# Patient Record
Sex: FEMALE | Race: BLACK OR AFRICAN AMERICAN | Employment: OTHER | URBAN - METROPOLITAN AREA
[De-identification: names, ages, dates, MRNs, and addresses within clinical notes are randomized per-mention and may not be internally consistent; named-entity substitution may affect disease eponyms.]

---

## 2018-10-01 ENCOUNTER — HOSPITAL ENCOUNTER (EMERGENCY)
Age: 48
Discharge: HOME | End: 2018-10-01

## 2018-10-01 VITALS
TEMPERATURE: 97.7 F | DIASTOLIC BLOOD PRESSURE: 96 MMHG | SYSTOLIC BLOOD PRESSURE: 169 MMHG | RESPIRATION RATE: 16 BRPM | HEART RATE: 97 BPM | OXYGEN SATURATION: 100 % | WEIGHT: 218.7 LBS | BODY MASS INDEX: 36.44 KG/M2 | HEIGHT: 65 IN

## 2018-10-01 DIAGNOSIS — K12.1 STOMATITIS: ICD-10-CM

## 2018-10-01 DIAGNOSIS — J32.4 CHRONIC PANSINUSITIS: Primary | ICD-10-CM

## 2018-10-01 LAB — S PYO AG THROAT QL: NEGATIVE

## 2018-10-01 PROCEDURE — 87081 CULTURE SCREEN ONLY: CPT

## 2018-10-01 PROCEDURE — 87880 STREP A ASSAY W/OPTIC: CPT

## 2018-10-01 PROCEDURE — 99283 EMERGENCY DEPT VISIT LOW MDM: CPT

## 2018-10-01 RX ORDER — CLARITHROMYCIN 500 MG/1
500 TABLET, COATED ORAL 2 TIMES DAILY
Qty: 14 TABLET | Refills: 0 | Status: SHIPPED | OUTPATIENT
Start: 2018-10-01 | End: 2018-10-08

## 2018-10-01 RX ORDER — PREDNISONE 20 MG/1
TABLET ORAL
Qty: 18 TABLET | Refills: 0 | Status: SHIPPED | OUTPATIENT
Start: 2018-10-01 | End: 2018-10-11

## 2018-10-01 ASSESSMENT — PAIN SCALES - GENERAL
PAINLEVEL_OUTOF10: 10
PAINLEVEL_OUTOF10: 10

## 2018-10-02 NOTE — ED PROVIDER NOTES
List as of 10/1/2018  3:39 PM      CONTINUE these medications which have NOT CHANGED    Details   albuterol (PROVENTIL HFA;VENTOLIN HFA) 108 (90 BASE) MCG/ACT inhaler Inhale 2 puffs into the lungs every 6 hours as needed for Wheezing. ALLERGIES     Patient has no known allergies. FAMILY HISTORY     History reviewed. No pertinent family history. No family status information on file. SOCIAL HISTORY      reports that she has never smoked. She has never used smokeless tobacco. She reports that she does not drink alcohol or use drugs. PHYSICAL EXAM    (up to 7 for level 4, 8 or more for level 5)     ED Triage Vitals [10/01/18 1450]   BP Temp Temp Source Pulse Resp SpO2 Height Weight   (!) 169/96 97.7 °F (36.5 °C) Oral 97 16 100 % 5' 5\" (1.651 m) 218 lb 11.1 oz (99.2 kg)       Physical Exam   Constitutional: She is oriented to person, place, and time. Vital signs are normal. She appears well-developed and well-nourished. She is cooperative. Non-toxic appearance. She does not have a sickly appearance. She does not appear ill. No distress. HENT:   Head: Normocephalic. Right Ear: Tympanic membrane, external ear and ear canal normal.   Left Ear: Tympanic membrane, external ear and ear canal normal.   Nose: Mucosal edema and rhinorrhea present. Right sinus exhibits maxillary sinus tenderness and frontal sinus tenderness. Left sinus exhibits maxillary sinus tenderness and frontal sinus tenderness. Mouth/Throat: Uvula is midline, oropharynx is clear and moist and mucous membranes are normal.   Eyes: Pupils are equal, round, and reactive to light. Neck: Normal range of motion. Neck supple. Cardiovascular: Normal rate and regular rhythm. Pulmonary/Chest: Effort normal and breath sounds normal. She has no wheezes. Lymphadenopathy:     She has no cervical adenopathy. Neurological: She is alert and oriented to person, place, and time. Skin: Skin is warm and dry. No rash noted.  She is not

## 2018-10-03 LAB — S PYO THROAT QL CULT: NORMAL

## 2018-10-03 ASSESSMENT — ENCOUNTER SYMPTOMS
SORE THROAT: 1
TROUBLE SWALLOWING: 0
SINUS PRESSURE: 1
SINUS PAIN: 1
COUGH: 1
GASTROINTESTINAL NEGATIVE: 1
VOICE CHANGE: 0

## 2018-10-22 ENCOUNTER — APPOINTMENT (OUTPATIENT)
Dept: GENERAL RADIOLOGY | Age: 48
End: 2018-10-22

## 2018-10-22 ENCOUNTER — HOSPITAL ENCOUNTER (EMERGENCY)
Age: 48
Discharge: HOME | End: 2018-10-22
Attending: EMERGENCY MEDICINE

## 2018-10-22 VITALS
TEMPERATURE: 97.9 F | SYSTOLIC BLOOD PRESSURE: 142 MMHG | RESPIRATION RATE: 16 BRPM | BODY MASS INDEX: 37.47 KG/M2 | DIASTOLIC BLOOD PRESSURE: 88 MMHG | HEIGHT: 65 IN | WEIGHT: 224.87 LBS | HEART RATE: 102 BPM | OXYGEN SATURATION: 100 %

## 2018-10-22 DIAGNOSIS — J02.9 ACUTE PHARYNGITIS, UNSPECIFIED ETIOLOGY: Primary | ICD-10-CM

## 2018-10-22 DIAGNOSIS — K14.0 GLOSSITIS: ICD-10-CM

## 2018-10-22 PROCEDURE — 70360 X-RAY EXAM OF NECK: CPT

## 2018-10-22 PROCEDURE — 6360000002 HC RX W HCPCS: Performed by: EMERGENCY MEDICINE

## 2018-10-22 PROCEDURE — 99283 EMERGENCY DEPT VISIT LOW MDM: CPT

## 2018-10-22 PROCEDURE — 6370000000 HC RX 637 (ALT 250 FOR IP): Performed by: EMERGENCY MEDICINE

## 2018-10-22 RX ORDER — IBUPROFEN 200 MG
800 TABLET ORAL EVERY 6 HOURS PRN
COMMUNITY
End: 2019-02-02

## 2018-10-22 RX ORDER — DEXAMETHASONE 4 MG/1
10 TABLET ORAL ONCE
Status: COMPLETED | OUTPATIENT
Start: 2018-10-22 | End: 2018-10-22

## 2018-10-22 RX ORDER — FLUCONAZOLE 200 MG/1
200 TABLET ORAL DAILY
Qty: 7 TABLET | Refills: 0 | Status: SHIPPED | OUTPATIENT
Start: 2018-10-22 | End: 2018-10-29

## 2018-10-22 RX ORDER — GABAPENTIN 300 MG/1
300 CAPSULE ORAL 3 TIMES DAILY
Qty: 30 CAPSULE | Refills: 0 | Status: SHIPPED | OUTPATIENT
Start: 2018-10-22 | End: 2019-09-05

## 2018-10-22 RX ORDER — NAPROXEN 250 MG/1
500 TABLET ORAL ONCE
Status: COMPLETED | OUTPATIENT
Start: 2018-10-22 | End: 2018-10-22

## 2018-10-22 RX ADMIN — DEXAMETHASONE 10 MG: 4 TABLET ORAL at 22:18

## 2018-10-22 RX ADMIN — NAPROXEN 500 MG: 250 TABLET ORAL at 22:19

## 2018-10-22 ASSESSMENT — PAIN SCALES - GENERAL
PAINLEVEL_OUTOF10: 7
PAINLEVEL_OUTOF10: 7
PAINLEVEL_OUTOF10: 10
PAINLEVEL_OUTOF10: 10

## 2018-10-22 ASSESSMENT — PAIN DESCRIPTION - DESCRIPTORS: DESCRIPTORS: ACHING

## 2018-10-22 ASSESSMENT — PAIN DESCRIPTION - PAIN TYPE: TYPE: ACUTE PAIN

## 2018-10-22 ASSESSMENT — PAIN DESCRIPTION - LOCATION: LOCATION: THROAT

## 2018-12-24 ENCOUNTER — HOSPITAL ENCOUNTER (EMERGENCY)
Age: 48
Discharge: HOME | End: 2018-12-25
Attending: EMERGENCY MEDICINE

## 2018-12-24 ENCOUNTER — APPOINTMENT (OUTPATIENT)
Dept: GENERAL RADIOLOGY | Age: 48
End: 2018-12-24

## 2018-12-24 DIAGNOSIS — M54.5 ACUTE BILATERAL LOW BACK PAIN, WITH SCIATICA PRESENCE UNSPECIFIED: Primary | ICD-10-CM

## 2018-12-24 LAB
BILIRUBIN URINE: NEGATIVE
BLOOD, URINE: NEGATIVE
CLARITY: CLEAR
COLOR: YELLOW
GLUCOSE URINE: NEGATIVE MG/DL
HCG(URINE) PREGNANCY TEST: NEGATIVE
KETONES, URINE: NEGATIVE MG/DL
LEUKOCYTE ESTERASE, URINE: NEGATIVE
MICROSCOPIC EXAMINATION: NORMAL
NITRITE, URINE: NEGATIVE
PH UA: 7
PROTEIN UA: NEGATIVE MG/DL
SPECIFIC GRAVITY UA: 1.02
URINE REFLEX TO CULTURE: NORMAL
URINE TYPE: NORMAL
UROBILINOGEN, URINE: 0.2 E.U./DL

## 2018-12-24 PROCEDURE — 96372 THER/PROPH/DIAG INJ SC/IM: CPT

## 2018-12-24 PROCEDURE — 81003 URINALYSIS AUTO W/O SCOPE: CPT

## 2018-12-24 PROCEDURE — 84703 CHORIONIC GONADOTROPIN ASSAY: CPT

## 2018-12-24 PROCEDURE — 72100 X-RAY EXAM L-S SPINE 2/3 VWS: CPT

## 2018-12-24 PROCEDURE — 99283 EMERGENCY DEPT VISIT LOW MDM: CPT

## 2018-12-24 PROCEDURE — 6360000002 HC RX W HCPCS: Performed by: EMERGENCY MEDICINE

## 2018-12-24 PROCEDURE — 6370000000 HC RX 637 (ALT 250 FOR IP): Performed by: EMERGENCY MEDICINE

## 2018-12-24 RX ORDER — ACETAMINOPHEN 500 MG
1000 TABLET ORAL ONCE
Status: COMPLETED | OUTPATIENT
Start: 2018-12-24 | End: 2018-12-24

## 2018-12-24 RX ORDER — LORAZEPAM 2 MG/ML
0.5 INJECTION INTRAMUSCULAR ONCE
Status: COMPLETED | OUTPATIENT
Start: 2018-12-24 | End: 2018-12-24

## 2018-12-24 RX ADMIN — ACETAMINOPHEN 1000 MG: 500 TABLET ORAL at 22:29

## 2018-12-24 RX ADMIN — LORAZEPAM 0.5 MG: 2 INJECTION INTRAMUSCULAR; INTRAVENOUS at 22:30

## 2018-12-24 ASSESSMENT — PAIN SCALES - GENERAL
PAINLEVEL_OUTOF10: 10
PAINLEVEL_OUTOF10: 7
PAINLEVEL_OUTOF10: 10

## 2018-12-25 VITALS
HEART RATE: 90 BPM | WEIGHT: 225 LBS | RESPIRATION RATE: 16 BRPM | TEMPERATURE: 98 F | BODY MASS INDEX: 37.49 KG/M2 | SYSTOLIC BLOOD PRESSURE: 165 MMHG | OXYGEN SATURATION: 97 % | HEIGHT: 65 IN | DIASTOLIC BLOOD PRESSURE: 87 MMHG

## 2018-12-25 RX ORDER — ACETAMINOPHEN 500 MG
500 TABLET ORAL 4 TIMES DAILY PRN
Qty: 60 TABLET | Refills: 0 | Status: SHIPPED | OUTPATIENT
Start: 2018-12-25 | End: 2019-02-02

## 2018-12-25 RX ORDER — CYCLOBENZAPRINE HCL 10 MG
10 TABLET ORAL 3 TIMES DAILY PRN
Qty: 20 TABLET | Refills: 0 | Status: SHIPPED | OUTPATIENT
Start: 2018-12-25 | End: 2019-01-01

## 2018-12-25 ASSESSMENT — PAIN SCALES - GENERAL: PAINLEVEL_OUTOF10: 5

## 2018-12-25 NOTE — ED TRIAGE NOTES
Woke up with low back and bilateral leg pain, NKI, patient states unable to sit on stretcher, kneeling on floor beside stretcher.

## 2018-12-25 NOTE — ED PROVIDER NOTES
All EKG's are interpreted by the Emergency Department Physician who either signs or Co-signsthis chart in the absence of a cardiologist.      RADIOLOGY:   Fariba Martini such as CT, Ultrasound and MRI are read by the radiologist. Plain radiographic images are visualized and preliminarily interpreted by the emergency physician with the below findings:    I do not appreciate any acute processes. Interpretation per the Radiologist below, if available at the time ofthis note:    XR LUMBAR SPINE (2-3 VIEWS)   Final Result   1. No acute lumbar spine abnormality. 2. Mild multilevel degenerative disc disease. ED BEDSIDE ULTRASOUND:   Performed by ED Physician - none    LABS:  Labs Reviewed   URINE RT REFLEX TO CULTURE    Narrative:     Performed at:  [facility]  [address] [place], [facility]   Phone [phone]   PREGNANCY, URINE    Narrative:     Performed at:  [facility]  [address] [place], [facility]   Phone [phone]       All other labs were within normal range or not returned as of this dictation. EMERGENCY DEPARTMENT COURSE and DIFFERENTIAL DIAGNOSIS/MDM:   Vitals:    Vitals:    12/24/18 2200 12/24/18 2349   BP: (!) 203/101 (!) 165/87   Pulse: 94 90   Resp: 16 16   Temp: 98 °F (36.7 °C)    TempSrc: Oral    SpO2: 97%    Weight: 225 lb (102.1 kg)    Height: 5' 5\" (1.651 m)        Middle-aged female with acute onset of lower back pain. This seems due to musculature however based on the patient's age and the fact that she isn't milligrams I will consider for Pott's disease. I will also consider for oncologic process because of her age and therefore x-rays ordered. Urinalysis is ordered to evaluate for urinary tract infection. Patient is given IM Ativan and acetaminophen for her symptoms.   The patient is reassessed C initially said that there was no improvement however the

## 2019-02-02 ENCOUNTER — HOSPITAL ENCOUNTER (EMERGENCY)
Age: 49
Discharge: HOME | End: 2019-02-02
Attending: EMERGENCY MEDICINE

## 2019-02-02 VITALS
BODY MASS INDEX: 38.13 KG/M2 | RESPIRATION RATE: 12 BRPM | OXYGEN SATURATION: 99 % | WEIGHT: 228.84 LBS | HEART RATE: 102 BPM | TEMPERATURE: 98 F | SYSTOLIC BLOOD PRESSURE: 176 MMHG | HEIGHT: 65 IN | DIASTOLIC BLOOD PRESSURE: 89 MMHG

## 2019-02-02 DIAGNOSIS — L02.91 ABSCESS: ICD-10-CM

## 2019-02-02 DIAGNOSIS — J02.9 ACUTE PHARYNGITIS, UNSPECIFIED ETIOLOGY: Primary | ICD-10-CM

## 2019-02-02 LAB — S PYO AG THROAT QL: NEGATIVE

## 2019-02-02 PROCEDURE — 6370000000 HC RX 637 (ALT 250 FOR IP): Performed by: EMERGENCY MEDICINE

## 2019-02-02 PROCEDURE — 99283 EMERGENCY DEPT VISIT LOW MDM: CPT

## 2019-02-02 PROCEDURE — 87880 STREP A ASSAY W/OPTIC: CPT

## 2019-02-02 PROCEDURE — 4500000023 HC ED LEVEL 3 PROCEDURE

## 2019-02-02 PROCEDURE — 87081 CULTURE SCREEN ONLY: CPT

## 2019-02-02 RX ORDER — SULFAMETHOXAZOLE AND TRIMETHOPRIM 800; 160 MG/1; MG/1
1 TABLET ORAL ONCE
Status: COMPLETED | OUTPATIENT
Start: 2019-02-02 | End: 2019-02-02

## 2019-02-02 RX ORDER — SULFAMETHOXAZOLE AND TRIMETHOPRIM 800; 160 MG/1; MG/1
1 TABLET ORAL 2 TIMES DAILY
Qty: 14 TABLET | Refills: 0 | Status: SHIPPED | OUTPATIENT
Start: 2019-02-02 | End: 2019-02-09

## 2019-02-02 RX ORDER — IBUPROFEN 600 MG/1
600 TABLET ORAL ONCE
Status: COMPLETED | OUTPATIENT
Start: 2019-02-02 | End: 2019-02-02

## 2019-02-02 RX ADMIN — SULFAMETHOXAZOLE AND TRIMETHOPRIM 1 TABLET: 800; 160 TABLET ORAL at 16:06

## 2019-02-02 RX ADMIN — IBUPROFEN 600 MG: 600 TABLET ORAL at 16:06

## 2019-02-02 ASSESSMENT — ENCOUNTER SYMPTOMS
VOICE CHANGE: 0
DIARRHEA: 0
SHORTNESS OF BREATH: 0
SORE THROAT: 1
TROUBLE SWALLOWING: 0
VOMITING: 0
NAUSEA: 0

## 2019-02-02 ASSESSMENT — PAIN SCALES - GENERAL
PAINLEVEL_OUTOF10: 10
PAINLEVEL_OUTOF10: 10

## 2019-02-02 ASSESSMENT — PAIN DESCRIPTION - LOCATION: LOCATION: THROAT

## 2019-02-05 LAB — S PYO THROAT QL CULT: NORMAL

## 2019-05-10 ENCOUNTER — HOSPITAL ENCOUNTER (EMERGENCY)
Age: 49
Discharge: HOME | End: 2019-05-11
Attending: EMERGENCY MEDICINE

## 2019-05-10 DIAGNOSIS — J02.9 ACUTE PHARYNGITIS, UNSPECIFIED ETIOLOGY: Primary | ICD-10-CM

## 2019-05-10 LAB — S PYO AG THROAT QL: NEGATIVE

## 2019-05-10 PROCEDURE — 96372 THER/PROPH/DIAG INJ SC/IM: CPT

## 2019-05-10 PROCEDURE — 87880 STREP A ASSAY W/OPTIC: CPT

## 2019-05-10 PROCEDURE — 87081 CULTURE SCREEN ONLY: CPT

## 2019-05-10 PROCEDURE — 99283 EMERGENCY DEPT VISIT LOW MDM: CPT

## 2019-05-10 PROCEDURE — 6360000002 HC RX W HCPCS: Performed by: EMERGENCY MEDICINE

## 2019-05-10 RX ORDER — AMOXICILLIN AND CLAVULANATE POTASSIUM 875; 125 MG/1; MG/1
1 TABLET, FILM COATED ORAL 2 TIMES DAILY
Qty: 20 TABLET | Refills: 0 | Status: SHIPPED | OUTPATIENT
Start: 2019-05-10 | End: 2019-05-20

## 2019-05-10 RX ORDER — KETOROLAC TROMETHAMINE 30 MG/ML
30 INJECTION, SOLUTION INTRAMUSCULAR; INTRAVENOUS ONCE
Status: COMPLETED | OUTPATIENT
Start: 2019-05-10 | End: 2019-05-10

## 2019-05-10 RX ORDER — CEFTRIAXONE SODIUM 250 MG/1
250 INJECTION, POWDER, FOR SOLUTION INTRAMUSCULAR; INTRAVENOUS ONCE
Status: COMPLETED | OUTPATIENT
Start: 2019-05-10 | End: 2019-05-10

## 2019-05-10 RX ADMIN — CEFTRIAXONE SODIUM 250 MG: 250 INJECTION, POWDER, FOR SOLUTION INTRAMUSCULAR; INTRAVENOUS at 22:31

## 2019-05-10 RX ADMIN — KETOROLAC TROMETHAMINE 30 MG: 30 INJECTION, SOLUTION INTRAMUSCULAR at 22:31

## 2019-05-10 ASSESSMENT — PAIN DESCRIPTION - LOCATION
LOCATION: THROAT

## 2019-05-10 ASSESSMENT — PAIN - FUNCTIONAL ASSESSMENT
PAIN_FUNCTIONAL_ASSESSMENT: 0-10
PAIN_FUNCTIONAL_ASSESSMENT: ACTIVITIES ARE NOT PREVENTED

## 2019-05-10 ASSESSMENT — PAIN SCALES - GENERAL
PAINLEVEL_OUTOF10: 10
PAINLEVEL_OUTOF10: 10
PAINLEVEL_OUTOF10: 7
PAINLEVEL_OUTOF10: 7

## 2019-05-10 ASSESSMENT — PAIN DESCRIPTION - PAIN TYPE
TYPE: ACUTE PAIN

## 2019-05-10 ASSESSMENT — PAIN DESCRIPTION - PROGRESSION
CLINICAL_PROGRESSION: NOT CHANGED
CLINICAL_PROGRESSION: GRADUALLY IMPROVING
CLINICAL_PROGRESSION: GRADUALLY IMPROVING

## 2019-05-10 ASSESSMENT — PAIN DESCRIPTION - ONSET
ONSET: ON-GOING

## 2019-05-10 ASSESSMENT — PAIN DESCRIPTION - DESCRIPTORS
DESCRIPTORS: SORE

## 2019-05-11 VITALS
DIASTOLIC BLOOD PRESSURE: 89 MMHG | HEART RATE: 92 BPM | HEIGHT: 65 IN | WEIGHT: 225.09 LBS | RESPIRATION RATE: 16 BRPM | TEMPERATURE: 98.8 F | OXYGEN SATURATION: 100 % | BODY MASS INDEX: 37.5 KG/M2 | SYSTOLIC BLOOD PRESSURE: 149 MMHG

## 2019-05-11 RX ORDER — ALBUTEROL SULFATE 90 UG/1
2 AEROSOL, METERED RESPIRATORY (INHALATION) EVERY 6 HOURS PRN
COMMUNITY
End: 2021-11-02

## 2019-05-11 RX ORDER — DIPHENHYDRAMINE HCL 25 MG
25 CAPSULE ORAL EVERY 6 HOURS PRN
COMMUNITY
End: 2021-09-27 | Stop reason: ALTCHOICE

## 2019-05-11 RX ORDER — LORATADINE 10 MG/1
10 CAPSULE, LIQUID FILLED ORAL DAILY
COMMUNITY
End: 2021-09-27 | Stop reason: ALTCHOICE

## 2019-05-11 RX ORDER — GABAPENTIN 300 MG/1
CAPSULE ORAL
COMMUNITY
Start: 2018-11-19 | End: 2019-09-05

## 2019-05-11 NOTE — ED PROVIDER NOTES
eMERGENCY dEPARTMENT eNCOUnter      Pt Name: Silvia Coombs  MRN: [id number]  [place] 1970  Date of evaluation: 5/10/2019  Provider: Ely Braxton MD     CHIEF COMPLAINT     No chief complaint on file. HISTORY OF PRESENT ILLNESS   (Location/Symptom, Timing/Onset,Context/Setting, Quality, Duration, Modifying Factors, Severity) Note limiting factors. HPI    Silvia Coombs is a 44 y.o. female who presents to the emergency department with a sore throat for couple weeks. Patient states she had a sore throat couple months ago was treated. Patient states the sore throat started again 2 weeks ago. She took some antibiotics from [place] that a friend had. Patient states she needs something stronger. Now she is congested and had some rhinorrhea and generalized muscle aches. Patient unable to sleep. There has been no fever. There has been some congestion. Afebrile. Nursing Notes were reviewed. REVIEW OFSYSTEMS    (2+ for level 4; 10+ for level 5)   Review of Systems    General: No fevers, chills or night sweats, No weight loss    Head:  Positive Sore throat,  No Ear Pain    Chest:  Nontender. No Cough, No SOB,  Chest Pain    GI: No abdominal pain or vomiting    : No dysuria or hematuria    Musculoskeletal: No unrelenting pain or night pain    Neurologic: No bowel or bladder incontinence, No saddle anesthesia, No leg weakness    All other systems reviewed and are negative. PAST MEDICAL HISTORY     Past Medical History:   Diagnosis Date    Asthma        SURGICAL HISTORY     No past surgical history on file. CURRENT MEDICATIONS       Previous Medications    GABAPENTIN (NEURONTIN) 300 MG CAPSULE    Take 1 capsule by mouth 3 times daily for 10 days. .       ALLERGIES     Patient has no known allergies. FAMILY HISTORY     No family history on file.      SOCIAL HISTORY       Social History     Socioeconomic History    Marital status: Single     Spouse name: Not on file    Number of active  Extremities: Moving all extremities. No calf tenderness. Peripheral pulses all intact  Skin: No skin lesions. No rashes  Neurologic: Cranial nerves II through XII was grossly intact. Nonfocal neurological exam  Psychiatric: Patient is pleasant. Mood is appropriate. DIAGNOSTIC RESULTS     EKG (Per Emergency Physician):       RADIOLOGY (Per Emergency Physician): Interpretation per the Radiologist below, if available at the time of this note:  No results found. ED BEDSIDE ULTRASOUND:   Performed by ED Physician - none    LABS:  Labs Reviewed   STREP SCREEN GROUP A THROAT    Narrative:     Performed at:  [facility]  [address] [facility]   Phone [phone]   CULTURE BETA STREP CONFIRM PLATE        All other labs were within normal range or not returned as of this dictation. Procedures      EMERGENCY DEPARTMENT COURSE and DIFFERENTIAL DIAGNOSIS/MDM:   Vitals:    Vitals:    05/10/19 2156   BP: (!) 171/98   Pulse: 100   Resp: 16   Temp: 98.8 °F (37.1 °C)   TempSrc: Oral   SpO2: 100%   Weight: 225 lb 1.4 oz (102.1 kg)   Height: 5' 5\" (1.651 m)       Medications   ketorolac (TORADOL) injection 30 mg (30 mg Intramuscular Given 5/10/19 2231)   cefTRIAXone (ROCEPHIN) injection 250 mg (250 mg Intramuscular Given 5/10/19 2231)       MDM. 47-year-old with sore throat for couple weeks. No fever just lot of the sinus issue. Patient was given a shot of Toradol and some Rocephin. Patient will be placed on Augmentin. Rapid strep was negative. Cultures pending. REVAL:         CRITICAL CARE TIME   Total CriticalCare time was 0 minutes, excluding separately reportable procedures. There was a high probability of clinically significant/life threatening deterioration in the patient's condition which required my urgent intervention.      CONSULTS:  None    PROCEDURES:  Unless otherwise noted below, none     [unfilled]    FINAL IMPRESSION      1. Acute pharyngitis, unspecified etiology          DISPOSITION/PLAN   DISPOSITION        PATIENT REFERRED TO:  [facility] [address] [place] [address] Corporate   [phone]    Schedule an appointment as soon as possible for a visit in 1 week  If symptoms worsen      DISCHARGE MEDICATIONS:  New Prescriptions    AMOXICILLIN-CLAVULANATE (AUGMENTIN) 875-125 MG PER TABLET    Take 1 tablet by mouth 2 times daily for 10 days    PSEUDOEPHEDRINE-DM-GG (CAPMIST DM) 60- MG TABS    Take 1 tablet by mouth 2 times daily          (Please note:  Portions of this note were completed with a voice recognition program.Efforts were made to edit the dictations but occasionally words and phrases are mis-transcribed.)  Form v2016. J.5-cn    Demi SEGURA MD (electronically signed)  Emergency Medicine Provider        Calvin Baltazar MD  05/11/19 5651

## 2019-05-13 LAB — S PYO THROAT QL CULT: NORMAL

## 2019-09-05 ENCOUNTER — APPOINTMENT (OUTPATIENT)
Dept: CT IMAGING | Age: 49
End: 2019-09-05
Payer: COMMERCIAL

## 2019-09-05 ENCOUNTER — HOSPITAL ENCOUNTER (EMERGENCY)
Age: 49
Discharge: HOME | End: 2019-09-05
Attending: EMERGENCY MEDICINE
Payer: COMMERCIAL

## 2019-09-05 VITALS
SYSTOLIC BLOOD PRESSURE: 154 MMHG | HEART RATE: 99 BPM | WEIGHT: 225.31 LBS | TEMPERATURE: 97.3 F | DIASTOLIC BLOOD PRESSURE: 87 MMHG | OXYGEN SATURATION: 99 % | RESPIRATION RATE: 18 BRPM | BODY MASS INDEX: 37.49 KG/M2

## 2019-09-05 DIAGNOSIS — M94.0 COSTOCHONDRITIS: Primary | ICD-10-CM

## 2019-09-05 LAB
BILIRUBIN URINE: NEGATIVE
BLOOD, URINE: ABNORMAL
CLARITY: ABNORMAL
COLOR: ABNORMAL
EPITHELIAL CELLS, UA: ABNORMAL /HPF
GLUCOSE URINE: NEGATIVE MG/DL
HCG(URINE) PREGNANCY TEST: NEGATIVE
KETONES, URINE: NEGATIVE MG/DL
LEUKOCYTE ESTERASE, URINE: NEGATIVE
MICROSCOPIC EXAMINATION: YES
NITRITE, URINE: NEGATIVE
PH UA: 6 (ref 5–8)
PROTEIN UA: 100 MG/DL
RBC UA: >100 /HPF (ref 0–2)
SPECIFIC GRAVITY UA: 1.02 (ref 1–1.03)
URINE REFLEX TO CULTURE: ABNORMAL
URINE TYPE: ABNORMAL
UROBILINOGEN, URINE: 0.2 E.U./DL
WBC UA: ABNORMAL /HPF (ref 0–5)

## 2019-09-05 PROCEDURE — 71250 CT THORAX DX C-: CPT

## 2019-09-05 PROCEDURE — 99284 EMERGENCY DEPT VISIT MOD MDM: CPT

## 2019-09-05 PROCEDURE — 6370000000 HC RX 637 (ALT 250 FOR IP): Performed by: EMERGENCY MEDICINE

## 2019-09-05 PROCEDURE — 84703 CHORIONIC GONADOTROPIN ASSAY: CPT

## 2019-09-05 PROCEDURE — 96372 THER/PROPH/DIAG INJ SC/IM: CPT

## 2019-09-05 PROCEDURE — 6360000002 HC RX W HCPCS: Performed by: EMERGENCY MEDICINE

## 2019-09-05 PROCEDURE — 81001 URINALYSIS AUTO W/SCOPE: CPT

## 2019-09-05 RX ORDER — OXYCODONE HYDROCHLORIDE AND ACETAMINOPHEN 5; 325 MG/1; MG/1
1 TABLET ORAL ONCE
Status: COMPLETED | OUTPATIENT
Start: 2019-09-05 | End: 2019-09-05

## 2019-09-05 RX ORDER — GABAPENTIN 300 MG/1
300 CAPSULE ORAL 2 TIMES DAILY
Qty: 30 CAPSULE | Refills: 0 | Status: SHIPPED | OUTPATIENT
Start: 2019-09-05 | End: 2021-09-27 | Stop reason: ALTCHOICE

## 2019-09-05 RX ORDER — OXYCODONE HYDROCHLORIDE AND ACETAMINOPHEN 5; 325 MG/1; MG/1
1 TABLET ORAL EVERY 6 HOURS PRN
Qty: 12 TABLET | Refills: 0 | Status: SHIPPED | OUTPATIENT
Start: 2019-09-05 | End: 2019-09-08

## 2019-09-05 RX ORDER — KETOROLAC TROMETHAMINE 30 MG/ML
30 INJECTION, SOLUTION INTRAMUSCULAR; INTRAVENOUS ONCE
Status: COMPLETED | OUTPATIENT
Start: 2019-09-05 | End: 2019-09-05

## 2019-09-05 RX ADMIN — KETOROLAC TROMETHAMINE 30 MG: 30 INJECTION, SOLUTION INTRAMUSCULAR at 10:50

## 2019-09-05 RX ADMIN — OXYCODONE HYDROCHLORIDE AND ACETAMINOPHEN 1 TABLET: 5; 325 TABLET ORAL at 10:50

## 2019-09-05 ASSESSMENT — PAIN SCALES - GENERAL
PAINLEVEL_OUTOF10: 5
PAINLEVEL_OUTOF10: 5
PAINLEVEL_OUTOF10: 10
PAINLEVEL_OUTOF10: 5
PAINLEVEL_OUTOF10: 10

## 2019-09-05 ASSESSMENT — PAIN DESCRIPTION - ORIENTATION: ORIENTATION: RIGHT

## 2019-09-05 ASSESSMENT — PAIN - FUNCTIONAL ASSESSMENT: PAIN_FUNCTIONAL_ASSESSMENT: 0-10

## 2019-09-05 ASSESSMENT — PAIN DESCRIPTION - DESCRIPTORS: DESCRIPTORS: SHARP;STABBING

## 2019-09-05 ASSESSMENT — PAIN DESCRIPTION - LOCATION: LOCATION: FLANK

## 2019-09-05 NOTE — ED NOTES
Reviewed AVS and discharge home care instructions with patient. Pt verbalized understanding and had no questions at this time. Pt sent home with prescription x2.      Ivon Viera RN  09/05/19 3000

## 2019-10-10 ENCOUNTER — APPOINTMENT (OUTPATIENT)
Dept: GENERAL RADIOLOGY | Age: 49
End: 2019-10-10
Payer: COMMERCIAL

## 2019-10-10 ENCOUNTER — HOSPITAL ENCOUNTER (EMERGENCY)
Age: 49
Discharge: HOME | End: 2019-10-10
Attending: EMERGENCY MEDICINE
Payer: COMMERCIAL

## 2019-10-10 VITALS
SYSTOLIC BLOOD PRESSURE: 175 MMHG | WEIGHT: 220.46 LBS | OXYGEN SATURATION: 96 % | HEART RATE: 96 BPM | DIASTOLIC BLOOD PRESSURE: 101 MMHG | BODY MASS INDEX: 36.73 KG/M2 | RESPIRATION RATE: 18 BRPM | TEMPERATURE: 98.2 F | HEIGHT: 65 IN

## 2019-10-10 DIAGNOSIS — J06.9 UPPER RESPIRATORY TRACT INFECTION, UNSPECIFIED TYPE: Primary | ICD-10-CM

## 2019-10-10 LAB — S PYO AG THROAT QL: NEGATIVE

## 2019-10-10 PROCEDURE — 71046 X-RAY EXAM CHEST 2 VIEWS: CPT

## 2019-10-10 PROCEDURE — 99283 EMERGENCY DEPT VISIT LOW MDM: CPT

## 2019-10-10 PROCEDURE — 87880 STREP A ASSAY W/OPTIC: CPT

## 2019-10-10 PROCEDURE — 6360000002 HC RX W HCPCS: Performed by: EMERGENCY MEDICINE

## 2019-10-10 PROCEDURE — 87081 CULTURE SCREEN ONLY: CPT

## 2019-10-10 RX ORDER — DEXAMETHASONE 4 MG/1
8 TABLET ORAL ONCE
Status: COMPLETED | OUTPATIENT
Start: 2019-10-10 | End: 2019-10-10

## 2019-10-10 RX ADMIN — DEXAMETHASONE 8 MG: 4 TABLET ORAL at 11:19

## 2019-10-10 ASSESSMENT — PAIN SCALES - GENERAL
PAINLEVEL_OUTOF10: 10
PAINLEVEL_OUTOF10: 10

## 2019-10-10 ASSESSMENT — ENCOUNTER SYMPTOMS
TROUBLE SWALLOWING: 0
SHORTNESS OF BREATH: 0
VOMITING: 0
COUGH: 1
FACIAL SWELLING: 0
NAUSEA: 0
STRIDOR: 0
WHEEZING: 0
ABDOMINAL PAIN: 0
COLOR CHANGE: 0
SORE THROAT: 1
VOICE CHANGE: 0

## 2019-10-10 ASSESSMENT — PAIN DESCRIPTION - DESCRIPTORS
DESCRIPTORS: PATIENT UNABLE TO DESCRIBE
DESCRIPTORS: PATIENT UNABLE TO DESCRIBE

## 2019-10-10 ASSESSMENT — PAIN DESCRIPTION - LOCATION
LOCATION: THROAT
LOCATION: THROAT

## 2019-10-10 ASSESSMENT — PAIN DESCRIPTION - FREQUENCY
FREQUENCY: CONTINUOUS
FREQUENCY: CONTINUOUS

## 2019-10-12 LAB — S PYO THROAT QL CULT: NORMAL

## 2020-07-02 ENCOUNTER — APPOINTMENT (OUTPATIENT)
Dept: CT IMAGING | Age: 50
End: 2020-07-02
Payer: COMMERCIAL

## 2020-07-02 ENCOUNTER — HOSPITAL ENCOUNTER (EMERGENCY)
Age: 50
Discharge: HOME | End: 2020-07-02
Attending: EMERGENCY MEDICINE
Payer: COMMERCIAL

## 2020-07-02 VITALS
SYSTOLIC BLOOD PRESSURE: 133 MMHG | TEMPERATURE: 98.1 F | DIASTOLIC BLOOD PRESSURE: 79 MMHG | BODY MASS INDEX: 38.81 KG/M2 | OXYGEN SATURATION: 100 % | RESPIRATION RATE: 18 BRPM | WEIGHT: 233.25 LBS | HEART RATE: 92 BPM

## 2020-07-02 LAB
ALBUMIN SERPL-MCNC: 4.2 G/DL (ref 3.4–5)
ALP BLD-CCNC: 63 U/L (ref 40–129)
ALT SERPL-CCNC: 27 U/L (ref 10–40)
ANION GAP SERPL CALCULATED.3IONS-SCNC: 13 MMOL/L (ref 3–16)
AST SERPL-CCNC: 29 U/L (ref 15–37)
BASOPHILS ABSOLUTE: 0 K/UL (ref 0–0.2)
BASOPHILS RELATIVE PERCENT: 0.5 %
BILIRUB SERPL-MCNC: 0.3 MG/DL (ref 0–1)
BILIRUBIN DIRECT: <0.2 MG/DL (ref 0–0.3)
BILIRUBIN, INDIRECT: NORMAL MG/DL (ref 0–1)
BUN BLDV-MCNC: 10 MG/DL (ref 7–20)
CALCIUM SERPL-MCNC: 9.4 MG/DL (ref 8.3–10.6)
CHLORIDE BLD-SCNC: 102 MMOL/L (ref 99–110)
CO2: 23 MMOL/L (ref 21–32)
CREAT SERPL-MCNC: <0.5 MG/DL (ref 0.6–1.1)
EKG ATRIAL RATE: 97 BPM
EKG DIAGNOSIS: NORMAL
EKG P AXIS: 51 DEGREES
EKG P-R INTERVAL: 170 MS
EKG Q-T INTERVAL: 344 MS
EKG QRS DURATION: 66 MS
EKG QTC CALCULATION (BAZETT): 436 MS
EKG R AXIS: -10 DEGREES
EKG T AXIS: 41 DEGREES
EKG VENTRICULAR RATE: 97 BPM
EOSINOPHILS ABSOLUTE: 0.1 K/UL (ref 0–0.6)
EOSINOPHILS RELATIVE PERCENT: 1.3 %
GFR AFRICAN AMERICAN: >60
GFR NON-AFRICAN AMERICAN: >60
GLUCOSE BLD-MCNC: 122 MG/DL (ref 70–99)
GONADOTROPIN, CHORIONIC (HCG) QUANT: <5 MIU/ML
HCT VFR BLD CALC: 37.8 % (ref 36–48)
HEMOGLOBIN: 12.3 G/DL (ref 12–16)
LIPASE: 34 U/L (ref 13–60)
LYMPHOCYTES ABSOLUTE: 2.7 K/UL (ref 1–5.1)
LYMPHOCYTES RELATIVE PERCENT: 52.6 %
MCH RBC QN AUTO: 27.6 PG (ref 26–34)
MCHC RBC AUTO-ENTMCNC: 32.5 G/DL (ref 31–36)
MCV RBC AUTO: 85 FL (ref 80–100)
MONOCYTES ABSOLUTE: 0.5 K/UL (ref 0–1.3)
MONOCYTES RELATIVE PERCENT: 9.4 %
NEUTROPHILS ABSOLUTE: 1.9 K/UL (ref 1.7–7.7)
NEUTROPHILS RELATIVE PERCENT: 36.2 %
PDW BLD-RTO: 15.5 % (ref 12.4–15.4)
PLATELET # BLD: 314 K/UL (ref 135–450)
PMV BLD AUTO: 8.4 FL (ref 5–10.5)
POTASSIUM REFLEX MAGNESIUM: 4.1 MMOL/L (ref 3.5–5.1)
RBC # BLD: 4.45 M/UL (ref 4–5.2)
SODIUM BLD-SCNC: 138 MMOL/L (ref 136–145)
TOTAL PROTEIN: 8.1 G/DL (ref 6.4–8.2)
WBC # BLD: 5.2 K/UL (ref 4–11)

## 2020-07-02 PROCEDURE — 36415 COLL VENOUS BLD VENIPUNCTURE: CPT

## 2020-07-02 PROCEDURE — 85025 COMPLETE CBC W/AUTO DIFF WBC: CPT

## 2020-07-02 PROCEDURE — 84702 CHORIONIC GONADOTROPIN TEST: CPT

## 2020-07-02 PROCEDURE — 83690 ASSAY OF LIPASE: CPT

## 2020-07-02 PROCEDURE — 93005 ELECTROCARDIOGRAM TRACING: CPT | Performed by: EMERGENCY MEDICINE

## 2020-07-02 PROCEDURE — 6360000004 HC RX CONTRAST MEDICATION: Performed by: EMERGENCY MEDICINE

## 2020-07-02 PROCEDURE — 74177 CT ABD & PELVIS W/CONTRAST: CPT

## 2020-07-02 PROCEDURE — 80048 BASIC METABOLIC PNL TOTAL CA: CPT

## 2020-07-02 PROCEDURE — 93010 ELECTROCARDIOGRAM REPORT: CPT | Performed by: INTERNAL MEDICINE

## 2020-07-02 PROCEDURE — 80076 HEPATIC FUNCTION PANEL: CPT

## 2020-07-02 PROCEDURE — 99284 EMERGENCY DEPT VISIT MOD MDM: CPT

## 2020-07-02 RX ORDER — POLYETHYLENE GLYCOL 3350 17 G/17G
17 POWDER, FOR SOLUTION ORAL DAILY
Qty: 1530 G | Refills: 1 | Status: SHIPPED | OUTPATIENT
Start: 2020-07-02 | End: 2020-12-29

## 2020-07-02 RX ORDER — GABAPENTIN 300 MG/1
300 CAPSULE ORAL 3 TIMES DAILY
Qty: 90 CAPSULE | Refills: 0 | Status: SHIPPED | OUTPATIENT
Start: 2020-07-02 | End: 2021-09-27 | Stop reason: ALTCHOICE

## 2020-07-02 RX ADMIN — IOVERSOL 100 ML: 678 INJECTION INTRA-ARTERIAL; INTRAVENOUS at 11:07

## 2020-07-02 ASSESSMENT — PAIN DESCRIPTION - PAIN TYPE: TYPE: ACUTE PAIN

## 2020-07-02 ASSESSMENT — PAIN DESCRIPTION - PROGRESSION: CLINICAL_PROGRESSION: GRADUALLY WORSENING

## 2020-07-02 ASSESSMENT — PAIN DESCRIPTION - DESCRIPTORS: DESCRIPTORS: CRAMPING

## 2020-07-02 ASSESSMENT — PAIN DESCRIPTION - LOCATION: LOCATION: ABDOMEN

## 2020-07-02 ASSESSMENT — PAIN SCALES - GENERAL
PAINLEVEL_OUTOF10: 4
PAINLEVEL_OUTOF10: 3

## 2020-07-02 ASSESSMENT — PAIN DESCRIPTION - ONSET: ONSET: PROGRESSIVE

## 2020-07-02 NOTE — ED NOTES
Pt states has been taking ducolax 2 days and 6 tablets of equate today has urge to have BM no results  abd feels bloated     Kali Rosales, REDD  07/02/20 4813

## 2020-07-02 NOTE — ED PROVIDER NOTES
Emergency Department Encounter    Patient: Francine Pope  MRN: [id number]  : 1970  Date of Evaluation: 2020  ED Provider:  Zan Reid    Triage Chief Complaint:   Constipation (pt states no bowel movement past 2 weeks)    [place]:  Francine Pope is a 44 y.o. female that presents to the ER for evaluation of a 14-day history of increasing constipation, she denies any abdominal surgery she has hadsimilar episodes in the past.  She has passed gas, she is tried multiple over-the-counter modalities without any relief of symptoms. Mild cramping. No vomiting. Positive flatus. No right lower left lower quadrant abdominal pain. No similar episodes in the past.    ROS - see HPI, below listed is current ROS at time of my eval:  General:  No fevers, no chills, no weakness  Eyes:  no discharge  ENT:  No sore throat, no nasal congestion  Cardiovascular:  No chest pain, no palpitations  Respiratory:  No shortness of breath, no cough, no wheezing  Gastrointestinal:  + pain, + nausea, no vomiting, no diarrhea  Musculoskeletal:  No muscle pain, no joint pain  Skin:  No rash, no pruritis  Neurologic:  no headache  Genitourinary:  No dysuria, no hematuria  Endocrine:  No unexpected weight gain, no unexpected weight loss  Extremities:  no edema, no pain    Past Medical History:   Diagnosis Date    Asthma      History reviewed. No pertinent surgical history. History reviewed. No pertinent family history.   Social History     Socioeconomic History    Marital status: Single     Spouse name: Not on file    Number of children: Not on file    Years of education: Not on file    Highest education level: Not on file   Occupational History    Not on file   Social Needs    Financial resource strain: Not on file    Food insecurity     Worry: Not on file     Inability: Not on file    Transportation needs     Medical: Not on file     Non-medical: Not on file   Tobacco Use    Smoking status: Never Smoker    Smokeless tobacco: Never Used   Substance and Sexual Activity    Alcohol use: No    Drug use: No    Sexual activity: Not on file   Lifestyle    Physical activity     Days per week: Not on file     Minutes per session: Not on file    Stress: Not on file   Relationships    Social connections     Talks on phone: Not on file     Gets together: Not on file     Attends [employer] service: Not on file     Active member of club or organization: Not on file     Attends meetings of clubs or organizations: Not on file     Relationship status: Not on file    Intimate partner violence     Fear of current or ex partner: Not on file     Emotionally abused: Not on file     Physically abused: Not on file     Forced sexual activity: Not on file   Other Topics Concern    Not on file   Social History Narrative    ** Merged History Encounter **          No current facility-administered medications for this encounter. Current Outpatient Medications   Medication Sig Dispense Refill    polyethylene glycol (GLYCOLAX) 17 GM/SCOOP powder Take 17 g by mouth daily Please use 3 times daily for the next 5 days 1530 g 1    gabapentin (NEURONTIN) 300 MG capsule Take 1 capsule by mouth 3 times daily for 30 days. 90 capsule 0    gabapentin (NEURONTIN) 300 MG capsule Take 1 capsule by mouth 2 times daily for 15 days.  Intended supply: 90 days 30 capsule 0    albuterol sulfate  (90 Base) MCG/ACT inhaler Inhale 2 puffs into the lungs every 6 hours as needed for Wheezing      loratadine (CLARITIN) 10 MG capsule Take 10 mg by mouth daily      diphenhydrAMINE (BENADRYL) 25 MG capsule Take 25 mg by mouth every 6 hours as needed for Allergies      Pseudoephedrine-DM-GG (CAPMIST DM) 60- MG TABS Take 1 tablet by mouth 2 times daily 56 tablet 0     No Known Allergies    Nursing Notes Reviewed    Physical Exam:  Triage VS:    ED Triage Vitals [07/02/20 0932]   Enc Vitals Group      /70      Pulse 100      Resp 18      Temp 98.1 °F (36.7 °C)      Temp Source Oral      SpO2 100 %      Weight 233 lb 4 oz (105.8 kg)      Height       Head Circumference       Peak Flow       Pain Score       Pain Loc       Pain Edu? Excl. in [address]? My pulse ox interpretation is - normal    General appearance:  No acute distress. Skin:  Warm. Dry. No rash. Neck:  Trachea midline. Heart:  Regular rate and rhythm, normal S1 & S2.    Perfusion:  intact  Respiratory:  Lungs clear to auscultation bilaterally. Respirations nonlabored. Abdominal: Mild distention with generalized tenderness to palpation, no rebound guarding or rigidity, neg Resendez's sign, neg McBurney's point tenderness to palpation, normal bowel sounds, no masses, no organomegaly, no pulsatile masses  Back:  No CVA TTP  Extremity:    normal ROM   Neurological:  Alert and oriented times 3.       I have reviewed and interpreted all of the currently available lab results from this visit (if applicable):  Results for orders placed or performed during the hospital encounter of 32/54/83   Basic Metabolic Panel w/ Reflex to MG   Result Value Ref Range    Sodium 138 136 - 145 mmol/L    Potassium reflex Magnesium 4.1 3.5 - 5.1 mmol/L    Chloride 102 99 - 110 mmol/L    CO2 23 21 - 32 mmol/L    Anion Gap 13 3 - 16    Glucose 122 (H) 70 - 99 mg/dL    BUN 10 7 - 20 mg/dL    CREATININE <0.5 (L) 0.6 - 1.1 mg/dL    GFR Non-African American >60 >60    GFR African American >60 >60    Calcium 9.4 8.3 - 10.6 mg/dL   Lipase   Result Value Ref Range    Lipase 34.0 13.0 - 60.0 U/L   Hepatic Function Panel   Result Value Ref Range    Total Protein 8.1 6.4 - 8.2 g/dL    Alb 4.2 3.4 - 5.0 g/dL    Alkaline Phosphatase 63 40 - 129 U/L    ALT 27 10 - 40 U/L    AST 29 15 - 37 U/L    Total Bilirubin 0.3 0.0 - 1.0 mg/dL    Bilirubin, Direct <0.2 0.0 - 0.3 mg/dL    Bilirubin, Indirect see below 0.0 - 1.0 mg/dL   HCG, Quantitative, Pregnancy   Result Value Ref Range    hCG Quant <5.0 <5.0 mIU/mL   CBC Auto Differential   Result Value Ref Range    WBC 5.2 4.0 - 11.0 K/uL    RBC 4.45 4.00 - 5.20 M/uL    Hemoglobin 12.3 12.0 - 16.0 g/dL    Hematocrit 37.8 36.0 - 48.0 %    MCV 85.0 80.0 - 100.0 fL    MCH 27.6 26.0 - 34.0 pg    MCHC 32.5 31.0 - 36.0 g/dL    RDW 15.5 (H) 12.4 - 15.4 %    Platelets 873 085 - 027 K/uL    MPV 8.4 5.0 - 10.5 fL    Neutrophils % 36.2 %    Lymphocytes % 52.6 %    Monocytes % 9.4 %    Eosinophils % 1.3 %    Basophils % 0.5 %    Neutrophils Absolute 1.9 1.7 - 7.7 K/uL    Lymphocytes Absolute 2.7 1.0 - 5.1 K/uL    Monocytes Absolute 0.5 0.0 - 1.3 K/uL    Eosinophils Absolute 0.1 0.0 - 0.6 K/uL    Basophils Absolute 0.0 0.0 - 0.2 K/uL   EKG 12 Lead   Result Value Ref Range    Ventricular Rate 97 BPM    Atrial Rate 97 BPM    P-R Interval 170 ms    QRS Duration 66 ms    Q-T Interval 344 ms    QTc Calculation (Bazett) 436 ms    P Axis 51 degrees    R Axis -10 degrees    T Axis 41 degrees    Diagnosis       Normal sinus rhythmMinimal voltage criteria for LVH, may be normal variantSeptal infarct , age undeterminedAbnormal ECGWhen compared with ECG of 16-AUG-2015 15:49,No significant change was found      Radiographs (if obtained):  Radiologist's Report Reviewed:  No results found. EKG (if obtained): (All EKG's are interpreted by myself in the absence of a cardiologist)    Rhythm, slight left axis deviation, normal R wave progression no acute ST segment abnormalities  MDM:  Patient here with abdominal pain. I estimate there is LOW risk for an acute emergent intraabdominal process including, but not limited to, acute appendicitis, bowel obstruction, acute cholecystitis, ruptured diverticulitis, incarcerated/strangling hernia,  perforated bowel/ulcer. Workup reveals bowel obstruction free air, she has mild uterine fibroids without clinical concern for necrosis or active bleeding or obstruction. Additionally, the patient was requesting a refill of her Neurontin for her chronic peripheral neuropathy. Should be placed on MiraLAX, limited refill of her Neurontin, and outpatient follow-up with gynecology. Patient's abdominal exam in the ED is nonsurgical.  I do feel the Patient can be discharged home. I have discussed the results, plan for treatment and possible risks, and patient agrees with discharging home with close follow-up. Patient understands and agrees with the plan, return warnings given. We have discussed the symptoms which are most concerning that necessitate immediate return. Clinical Impression:  1. Constipation, unspecified constipation type    2. Neuropathy    3. Intramural leiomyoma of uterus      Disposition referral (if applicable):  [facility] [address]  [phone]          Disposition medications (if applicable):  Discharge Medication List as of 7/2/2020 11:58 AM      START taking these medications    Details   polyethylene glycol (GLYCOLAX) 17 GM/SCOOP powder Take 17 g by mouth daily Please use 3 times daily for the next 5 days, Disp-1530 g, R-1Print             Comment: Please note this report has been produced using speech recognition software and may contain errors related to that system including errors in grammar, punctuation, and spelling, as well as words and phrases that may be inappropriate. Efforts were made to edit the dictations.       Geovanni Castellanos MD  43/31/27 1212 Grant Hendrix MD  13/55/11 7291

## 2021-06-16 ENCOUNTER — HOSPITAL ENCOUNTER (OUTPATIENT)
Dept: PHYSICAL THERAPY | Age: 51
Setting detail: THERAPIES SERIES
Discharge: HOME | End: 2021-06-16
Payer: COMMERCIAL

## 2021-06-16 NOTE — PLAN OF CARE
[facility], [facility]  [address] [facility], [facility]  Phone: [phone]   Fax:     [phone]    Physical Therapy  Cancellation/No-show Note  Patient Name:  Rupert Breen  :  1970   Date:  2021  Cancelled visits to date: 1  No-shows to date: 0    Patient status for today's appointment patient:  [x]  Cancelled  []  Rescheduled appointment  []  No-show     Reason given by patient:  []  Patient ill  []  Conflicting appointment  []  No transportation    []  Conflict with work  []  No reason given  []  Other:     Comments:      Phone call information:   []  Phone call made today to patient at _ time at number provided:      []  Patient answered, conversation as follows:    []  Patient did not answer, message left as follows:  []  Phone call not made today    Electronically signed by:  Maxi Kohler [id number]

## 2021-06-21 ENCOUNTER — HOSPITAL ENCOUNTER (OUTPATIENT)
Dept: PHYSICAL THERAPY | Age: 51
Setting detail: THERAPIES SERIES
Discharge: HOME | End: 2021-06-21
Payer: COMMERCIAL

## 2021-06-23 ENCOUNTER — OFFICE VISIT (OUTPATIENT)
Dept: ORTHOPEDIC SURGERY | Age: 51
End: 2021-06-23
Payer: COMMERCIAL

## 2021-06-23 VITALS — WEIGHT: 235 LBS | HEIGHT: 65 IN | BODY MASS INDEX: 39.15 KG/M2 | RESPIRATION RATE: 16 BRPM

## 2021-06-23 DIAGNOSIS — M17.12 PRIMARY OSTEOARTHRITIS OF LEFT KNEE: Primary | ICD-10-CM

## 2021-06-23 DIAGNOSIS — M17.10 PRIMARY LOCALIZED OSTEOARTHROSIS OF LOWER LEG, UNSPECIFIED LATERALITY: ICD-10-CM

## 2021-06-23 DIAGNOSIS — M17.11 PRIMARY OSTEOARTHRITIS OF RIGHT KNEE: ICD-10-CM

## 2021-06-23 PROCEDURE — G8428 CUR MEDS NOT DOCUMENT: HCPCS | Performed by: ORTHOPAEDIC SURGERY

## 2021-06-23 PROCEDURE — 20610 DRAIN/INJ JOINT/BURSA W/O US: CPT | Performed by: ORTHOPAEDIC SURGERY

## 2021-06-23 PROCEDURE — G8419 CALC BMI OUT NRM PARAM NOF/U: HCPCS | Performed by: ORTHOPAEDIC SURGERY

## 2021-06-23 PROCEDURE — 99204 OFFICE O/P NEW MOD 45 MIN: CPT | Performed by: ORTHOPAEDIC SURGERY

## 2021-06-23 PROCEDURE — 3017F COLORECTAL CA SCREEN DOC REV: CPT | Performed by: ORTHOPAEDIC SURGERY

## 2021-06-23 PROCEDURE — 1036F TOBACCO NON-USER: CPT | Performed by: ORTHOPAEDIC SURGERY

## 2021-06-23 RX ORDER — BUPIVACAINE HYDROCHLORIDE 2.5 MG/ML
3 INJECTION, SOLUTION INFILTRATION; PERINEURAL ONCE
Status: COMPLETED | OUTPATIENT
Start: 2021-06-23 | End: 2021-06-23

## 2021-06-23 RX ORDER — MELOXICAM 15 MG/1
15 TABLET ORAL DAILY PRN
Qty: 30 TABLET | Refills: 0 | Status: SHIPPED | OUTPATIENT
Start: 2021-06-23 | End: 2022-07-28

## 2021-06-23 RX ORDER — METHYLPREDNISOLONE ACETATE 40 MG/ML
80 INJECTION, SUSPENSION INTRA-ARTICULAR; INTRALESIONAL; INTRAMUSCULAR; SOFT TISSUE ONCE
Status: COMPLETED | OUTPATIENT
Start: 2021-06-23 | End: 2021-06-23

## 2021-06-23 RX ADMIN — METHYLPREDNISOLONE ACETATE 80 MG: 40 INJECTION, SUSPENSION INTRA-ARTICULAR; INTRALESIONAL; INTRAMUSCULAR; SOFT TISSUE at 09:47

## 2021-06-23 RX ADMIN — BUPIVACAINE HYDROCHLORIDE 7.5 MG: 2.5 INJECTION, SOLUTION INFILTRATION; PERINEURAL at 09:46

## 2021-06-23 NOTE — PROGRESS NOTES
Alexis Medina  <[id number]>  June 23, 2021    Chief Complaint   Patient presents with    Knee Pain     bilat knee pain        History: The patient is a 58-year-old female who is here for evaluation of both knees. She has had bilateral knee pain for nearly a year. The pain has been rather severe for the past several months. She rates her pain as 10/10. The pain is equal in both knees. She has difficulty squatting. She has difficulty with stairs. She does have pain at nighttime. She denies any injury. She denies any numbness or tingling. The patient does have a past medical history remarkable for asthma. The patient's  past medical history, medications, allergies,  family history, social history, and have been reviewed, and dated and are recorded in the chart. Pertinent items are noted in HPI. Review of systems reviewed from Pertinent History Form dated on 6/23/2021 and available in the patient's chart under the Media tab. Vitals:  Resp 16   Ht 5' 5\" (1.651 m)   Wt 235 lb (106.6 kg)   BMI 39.11 kg/m²     Physical: Ms. Alexis Medina appears well, she is in no apparent distress, she demonstrates appropriate mood & affect. She is alert and oriented to person, place and time. Examination of the bilateral lower extremity reveals no pain with range of motion of the hip. She has generalized tenderness to palpation about the joint line of the bilateral knee. Range of motion is from 0 degrees to 115 degrees bilaterally. Strength is 4+ to 5/5 for all muscle groups about the bilateral knee. There is patellofemoral crepitus with range of motion of the bilateral knee. Varus and valgus stressing of the knees reveals no evidence of instability. There is a small effusion in the right knee. Anterior drawer and Lachman are negative bilaterally. Examination of the skin reveals no rashes, ulceration, or lesion, bilaterally in the lower extremities. Sensation to both lower extremities is grossly intact.  Exam of both feet reveals pedal pulses intact and brisk cap refill. Patient is able to dorsiflex and wiggle all toes. Deep tendon reflexes of the lower extremities are normal and symmetric. X-rays: 4 views of both knees obtained in the office today were extensively reviewed. There is moderate osteoarthritis bilaterally. The changes are most severe medially. Impression: Bilateral knee osteoarthritis    Plan: At this time, the bilateral knee was injected under sterile conditions. After a Betadine prep of the joint, 3 cc of 0.25% Marcaine and 2 cc of 40 mg Depo-medrol were injected into the knee. The patient tolerated the injection rather well. The patient was instructed to follow up for any signs of infection. Natural history and expected course discussed. Questions answered. Reduction in offending activity. OTC analgesics as needed. The patient will follow up with me in 6 weeks. The patient was given a prescription for meloxicam.  We encouraged her on a weight loss program.  We also instructed the patient on a home exercise program to work on quad strengthening.

## 2021-08-04 ENCOUNTER — OFFICE VISIT (OUTPATIENT)
Dept: ORTHOPEDIC SURGERY | Age: 51
End: 2021-08-04
Payer: COMMERCIAL

## 2021-08-04 VITALS — BODY MASS INDEX: 39.15 KG/M2 | WEIGHT: 235 LBS | HEIGHT: 65 IN

## 2021-08-04 DIAGNOSIS — M47.816 LUMBAR SPONDYLOSIS: ICD-10-CM

## 2021-08-04 DIAGNOSIS — M17.11 PRIMARY OSTEOARTHRITIS OF RIGHT KNEE: Primary | ICD-10-CM

## 2021-08-04 DIAGNOSIS — M17.12 PRIMARY OSTEOARTHRITIS OF LEFT KNEE: ICD-10-CM

## 2021-08-04 PROCEDURE — 3017F COLORECTAL CA SCREEN DOC REV: CPT | Performed by: ORTHOPAEDIC SURGERY

## 2021-08-04 PROCEDURE — G8417 CALC BMI ABV UP PARAM F/U: HCPCS | Performed by: ORTHOPAEDIC SURGERY

## 2021-08-04 PROCEDURE — 1036F TOBACCO NON-USER: CPT | Performed by: ORTHOPAEDIC SURGERY

## 2021-08-04 PROCEDURE — G8427 DOCREV CUR MEDS BY ELIG CLIN: HCPCS | Performed by: ORTHOPAEDIC SURGERY

## 2021-08-04 PROCEDURE — 99214 OFFICE O/P EST MOD 30 MIN: CPT | Performed by: ORTHOPAEDIC SURGERY

## 2021-08-04 RX ORDER — CYCLOBENZAPRINE HCL 10 MG
10 TABLET ORAL NIGHTLY PRN
Qty: 10 TABLET | Refills: 0 | Status: SHIPPED | OUTPATIENT
Start: 2021-08-04 | End: 2021-08-14

## 2021-08-04 RX ORDER — METHYLPREDNISOLONE 4 MG/1
TABLET ORAL
Qty: 1 KIT | Refills: 0 | Status: SHIPPED | OUTPATIENT
Start: 2021-08-04 | End: 2021-08-10

## 2021-08-27 ENCOUNTER — TELEPHONE (OUTPATIENT)
Dept: ORTHOPEDIC SURGERY | Age: 51
End: 2021-08-27

## 2021-08-27 ENCOUNTER — OFFICE VISIT (OUTPATIENT)
Dept: ORTHOPEDIC SURGERY | Age: 51
End: 2021-08-27
Payer: COMMERCIAL

## 2021-08-27 VITALS — HEIGHT: 65 IN | WEIGHT: 235 LBS | RESPIRATION RATE: 18 BRPM | BODY MASS INDEX: 39.15 KG/M2

## 2021-08-27 DIAGNOSIS — M54.50 LOW BACK PAIN, UNSPECIFIED BACK PAIN LATERALITY, UNSPECIFIED CHRONICITY, UNSPECIFIED WHETHER SCIATICA PRESENT: Primary | ICD-10-CM

## 2021-08-27 DIAGNOSIS — E66.01 MORBID OBESITY (HCC): ICD-10-CM

## 2021-08-27 DIAGNOSIS — M17.11 PRIMARY OSTEOARTHRITIS OF RIGHT KNEE: ICD-10-CM

## 2021-08-27 DIAGNOSIS — M17.12 PRIMARY OSTEOARTHRITIS OF LEFT KNEE: ICD-10-CM

## 2021-08-27 PROCEDURE — G8427 DOCREV CUR MEDS BY ELIG CLIN: HCPCS | Performed by: ORTHOPAEDIC SURGERY

## 2021-08-27 PROCEDURE — 20610 DRAIN/INJ JOINT/BURSA W/O US: CPT | Performed by: ORTHOPAEDIC SURGERY

## 2021-08-27 PROCEDURE — 3017F COLORECTAL CA SCREEN DOC REV: CPT | Performed by: ORTHOPAEDIC SURGERY

## 2021-08-27 PROCEDURE — 99214 OFFICE O/P EST MOD 30 MIN: CPT | Performed by: ORTHOPAEDIC SURGERY

## 2021-08-27 PROCEDURE — G8417 CALC BMI ABV UP PARAM F/U: HCPCS | Performed by: ORTHOPAEDIC SURGERY

## 2021-08-27 PROCEDURE — 1036F TOBACCO NON-USER: CPT | Performed by: ORTHOPAEDIC SURGERY

## 2021-08-27 RX ORDER — METHYLPREDNISOLONE ACETATE 40 MG/ML
80 INJECTION, SUSPENSION INTRA-ARTICULAR; INTRALESIONAL; INTRAMUSCULAR; SOFT TISSUE ONCE
Status: COMPLETED | OUTPATIENT
Start: 2021-08-27 | End: 2021-08-27

## 2021-08-27 RX ORDER — BUPIVACAINE HYDROCHLORIDE 2.5 MG/ML
3 INJECTION, SOLUTION INFILTRATION; PERINEURAL ONCE
Status: COMPLETED | OUTPATIENT
Start: 2021-08-27 | End: 2021-08-27

## 2021-08-27 RX ORDER — CYCLOBENZAPRINE HCL 10 MG
10 TABLET ORAL 2 TIMES DAILY PRN
Qty: 20 TABLET | Refills: 0 | Status: SHIPPED | OUTPATIENT
Start: 2021-08-27 | End: 2021-09-15 | Stop reason: SDUPTHER

## 2021-08-27 RX ORDER — MELOXICAM 15 MG/1
15 TABLET ORAL DAILY PRN
Qty: 30 TABLET | Refills: 0 | Status: SHIPPED | OUTPATIENT
Start: 2021-08-27 | End: 2021-09-27 | Stop reason: SDUPTHER

## 2021-08-27 RX ADMIN — BUPIVACAINE HYDROCHLORIDE 7.5 MG: 2.5 INJECTION, SOLUTION INFILTRATION; PERINEURAL at 11:56

## 2021-08-27 RX ADMIN — METHYLPREDNISOLONE ACETATE 80 MG: 40 INJECTION, SUSPENSION INTRA-ARTICULAR; INTRALESIONAL; INTRAMUSCULAR; SOFT TISSUE at 11:57

## 2021-08-27 NOTE — PROGRESS NOTES
Too Deleon  <[id number]>  August 27, 2021    Chief Complaint   Patient presents with    Knee Pain     bilateral       History: The patient is a 68-year-old female who is here for evaluation of both knees. She is here much sooner than her regular scheduled appointment. She did take a Medrol Dosepak which did not alleviate her symptoms. She is complaining of severe low back pain. She has been taking meloxicam.  She was taking Flexeril. The patient does have a past medical history remarkable for obesity and asthma. She does continue to have some difficulty with stairs due to her knees. She did receive bilateral knee injections back in June. The patient's  past medical history, medications, allergies,  family history, social history, and have been reviewed, and dated and are recorded in the chart. Pertinent items are noted in HPI. Review of systems reviewed from Pertinent History Form dated on 6/23/2021 and available in the patient's chart under the Media tab. Vitals:  Resp 18   Ht 5' 5\" (1.651 m)   Wt 235 lb (106.6 kg)   BMI 39.11 kg/m²     Physical: Ms. Too Deleon appears well, she is in no apparent distress, she demonstrates appropriate mood & affect. She is alert and oriented to person, place and time. Examination of the bilateral lower extremity reveals no pain with range of motion of the hip. She has generalized tenderness to palpation about the joint line of the bilateral knee. Range of motion is from 0 degrees to 120 degrees bilaterally. Strength is 4+ to 5/5 for all muscle groups about the bilateral knee. There is patellofemoral crepitus with range of motion of the bilateral knee. Varus and valgus stressing of the knees reveals no evidence of instability. There is no clear evidence of  effusion in the knees. Anterior drawer and Lachman are negative bilaterally. Examination of the skin reveals no rashes, ulceration, or lesion, bilaterally in the lower extremities.  Sensation to both lower extremities is grossly intact. Exam of both feet reveals pedal pulses intact and brisk cap refill. Patient is able to dorsiflex and wiggle all toes. Deep tendon reflexes of the lower extremities are normal and symmetric. Examination of the lumbar spine does reveal severe diffuse perilumbar muscle tightness and tenderness. She does have limited flexion of the lumbar spine. Tension signs in both lower extremities are negative. X-rays: 2 views of the lumbar spine obtained in the office today were extensively reviewed. The x-rays confirm diffuse degenerative disc disease. There is also facet changes consistent with lumbar spondylosis. We again reviewed her knee x-rays from the past and she has bilateral knee osteoarthritis. The changes are most severe medially. Impression: Bilateral knee osteoarthritis #2 lumbar spondylosis #3 morbid obesity #4 lumbar degenerative disc disease    Plan: At this time, the bilateral knees were injected under sterile conditions. After a Betadine prep of the joints, 3 cc of 0.25% Marcaine and 2 cc of 40 mg Depo-medrol were injected into the knees. The patient tolerated the injections rather well. The patient was instructed to follow up for any signs of infection. Natural history and expected course discussed. Questions answered. Reduction in offending activity. OTC analgesics as needed. The patient will follow up with me in 6 weeks. We will give the patient a referral to the spine team.  She was also given a referral to the [facility] bariatric team.  She was given a prescription for meloxicam.  She was also given a prescription for Flexeril.

## 2021-08-30 ENCOUNTER — OFFICE VISIT (OUTPATIENT)
Dept: ORTHOPEDIC SURGERY | Age: 51
End: 2021-08-30
Payer: COMMERCIAL

## 2021-08-30 VITALS — HEIGHT: 65 IN | WEIGHT: 235 LBS | RESPIRATION RATE: 16 BRPM | BODY MASS INDEX: 39.15 KG/M2

## 2021-08-30 DIAGNOSIS — M79.604 LUMBAR PAIN WITH RADIATION DOWN BOTH LEGS: ICD-10-CM

## 2021-08-30 DIAGNOSIS — M54.50 LUMBAR PAIN WITH RADIATION DOWN BOTH LEGS: ICD-10-CM

## 2021-08-30 DIAGNOSIS — M47.816 LUMBAR SPONDYLOSIS: Primary | ICD-10-CM

## 2021-08-30 DIAGNOSIS — M79.605 LUMBAR PAIN WITH RADIATION DOWN BOTH LEGS: ICD-10-CM

## 2021-08-30 PROCEDURE — G8417 CALC BMI ABV UP PARAM F/U: HCPCS | Performed by: PHYSICIAN ASSISTANT

## 2021-08-30 PROCEDURE — G8427 DOCREV CUR MEDS BY ELIG CLIN: HCPCS | Performed by: PHYSICIAN ASSISTANT

## 2021-08-30 PROCEDURE — 99214 OFFICE O/P EST MOD 30 MIN: CPT | Performed by: PHYSICIAN ASSISTANT

## 2021-08-30 PROCEDURE — 1036F TOBACCO NON-USER: CPT | Performed by: PHYSICIAN ASSISTANT

## 2021-08-30 PROCEDURE — 3017F COLORECTAL CA SCREEN DOC REV: CPT | Performed by: PHYSICIAN ASSISTANT

## 2021-08-30 RX ORDER — TRAMADOL HYDROCHLORIDE 50 MG/1
50 TABLET ORAL EVERY 6 HOURS PRN
Qty: 28 TABLET | Refills: 0 | Status: SHIPPED | OUTPATIENT
Start: 2021-08-30 | End: 2021-09-06

## 2021-09-01 NOTE — PROGRESS NOTES
History of present illness:   Ms. Gertrude Almaguer  is a pleasant 46 y.o. female with a PMH of osteoarthritis and asthma kindly referred by Dr. Martin Coleman for consultation regarding her LBP and left greater than right leg pain. She states her pain began approximately 2 or 3 weeks ago. Her pain has steadily worsened since onset. She rates her back pain moderate to severe and leg pain moderate to severe. She describes the pain as constant ache. The leg pain radiates down the posterior aspect of her leg to her lateral calf and feet. She denies numbness and tingling into her lower extremities. She denies weakness of her left and right leg. She denies bowel or bladder dysfunction and saddle anesthesia. She states she can sit for a maximum of 5 minutes and stand for a maximum 1 minute. The pain does disrupts her sleep. She has tried Mobic and Flexeril without relief. Past medical history:  Her past medical history has been reviewed. Past Medical History:   Diagnosis Date    Asthma        Her past surgical history has been reviewed. History reviewed. No pertinent surgical history. Her medications and allergies were reviewed. Current Outpatient Medications   Medication Sig Dispense Refill    traMADol (ULTRAM) 50 MG tablet Take 1 tablet by mouth every 6 hours as needed for Pain for up to 7 days. 28 tablet 0    cyclobenzaprine (FLEXERIL) 10 MG tablet Take 1 tablet by mouth 2 times daily as needed for Muscle spasms 20 tablet 0    meloxicam (MOBIC) 15 MG tablet Take 1 tablet by mouth daily as needed for Pain 30 tablet 0    meloxicam (MOBIC) 15 MG tablet Take 1 tablet by mouth daily as needed for Pain 30 tablet 0    gabapentin (NEURONTIN) 300 MG capsule Take 1 capsule by mouth 3 times daily for 30 days. 90 capsule 0    gabapentin (NEURONTIN) 300 MG capsule Take 1 capsule by mouth 2 times daily for 15 days.  Intended supply: 90 days 30 capsule 0    albuterol sulfate  (90 Base) MCG/ACT inhaler Inhale 2 puffs into the lungs every 6 hours as needed for Wheezing      loratadine (CLARITIN) 10 MG capsule Take 10 mg by mouth daily      diphenhydrAMINE (BENADRYL) 25 MG capsule Take 25 mg by mouth every 6 hours as needed for Allergies      Pseudoephedrine-DM-GG (CAPMIST DM) 60- MG TABS Take 1 tablet by mouth 2 times daily 56 tablet 0     No current facility-administered medications for this visit. Her social history has been reviewed. Social History     Occupational History    Not on file   Tobacco Use    Smoking status: Never Smoker    Smokeless tobacco: Never Used   Substance and Sexual Activity    Alcohol use: No    Drug use: No    Sexual activity: Not on file     Her family history has been reviewed. History reviewed. No pertinent family history. Review of symptoms:  Review of Systems:  I have reviewed the clinically relevant past medical history, medications, allergies, family history, social history, and 13 point Review of Systems from the patient's recent history form & documented any details relevant to today's presenting complaints in the history above. The patient's self-reported past medical history, medications, allergies, family history, social history, and Review of Systems form from today's date have been scanned into the chart under the \"Media\" tab. Patient's review of symptoms was reviewed and is significant for back pain and negative for recent weight loss, fatigue, chills, visual disturbances, blood in stool or urine, recent infection. Physical examination:  Ms. Scarlet Kaufman most recent vitals:  Vitals  Resp: 16  Height: 5' 5\" (165.1 cm)  Weight: 235 lb (106.6 kg)  Body mass index is 39.11 kg/m². General exam:  She is well-developed and well-nourished, is in obvious discomfort and alert and oriented to person, place, and time. She demonstrates appropriate mood and affect. Her skin is warm and dry.    Her gait is normal and she walks heel to toe without limp or instability. Back:  She stands with slight lumbar flexion. Her lumbar flexion, extension and lateral bending are moderately reduced with pain. She has mild tenderness over her lumbar spine without obvious muscle spasm. The skin over her lumbar spine is normal without a surgical scar. Lower extremities:  She has 5/5 motor strength of bilateral lower extremities. She has a negative straight leg raise, bilaterally. Deep tendon reflexes at knees and achilles are 2+. Sensation is intact to light touch L3 to S1 bilaterally. She has no clonus. Hip range of motion is painless and negative Stinchfield test.    Abdomen:  Non-tender and non-distended. No rash. Imaging:  X rays was obtained in the office today. AP and lateral lumbar spine x-rays demonstrates moderate degenerative disc disease and facet arthritis. No spondylolisthesis. Diagnosis:      ICD-10-CM    1. Lumbar spondylosis  M47.816 Ambulatory referral to Physical Therapy     traMADol (ULTRAM) 50 MG tablet   2. Lumbar pain with radiation down both legs  M54.5 Ambulatory referral to Physical Therapy     traMADol (ULTRAM) 50 MG tablet          Assessment/ Plan:  Low back pain and bilateral leg pain felt to be related to lumbar stenosis. She does have spondylosis of the lumbar spine without a spondylolisthesis. I had an extensive discussion with Ms. Marika Hamilton and/or family regarding the natural history, etiology, and long term consequences of her condition. I have presented reasonable alternatives to the patient's proposed care, treatment, and services. Risks and benefits of the treatment options also reviewed in detail. I have outlined a treatment plan with them. She has had full opportunity to ask her questions. I have answered them all to her satisfaction. I feel that Ms. Marika Hamilton understands our discussion today     New Medications prescribed today-tramadol for pain.   Controlled substances monitoring: possible medication side effects, risk of tolerance and/or dependence, and alternative treatments discussed, no signs of potential drug abuse or diversion identified and OARRS report reviewed today- activity consistent with treatment plan. PT-PT Rx provided today. Further Imaging-discussed possible need for MRI of the lumbar spine if symptoms fail to improve with conservative treatment. Procedures-briefly discussed epidural steroid injections. Follow up-3-4 weeks. She was instructed to call us emergently if she begins to experience bowel or bladder dysfunction, saddle anesthesia, increasing muscle weakness, or onset/ worsening leg symptoms. Kristen Gayle PA-C   Senior Physician Assistant   [facility]                                         Disclaimer: This note was generated with use of a verbal recognition program (DRAGON) and an attempt was made to check for errors. It is possible that there are still dictated errors within this office note. If so, please bring any significant errors to my attention for an addendum. All efforts were made to ensure that this office note is accurate.

## 2021-09-15 ENCOUNTER — OFFICE VISIT (OUTPATIENT)
Dept: ORTHOPEDIC SURGERY | Age: 51
End: 2021-09-15
Payer: COMMERCIAL

## 2021-09-15 VITALS — WEIGHT: 235 LBS | BODY MASS INDEX: 39.15 KG/M2 | HEIGHT: 65 IN

## 2021-09-15 DIAGNOSIS — M17.12 PRIMARY OSTEOARTHRITIS OF LEFT KNEE: ICD-10-CM

## 2021-09-15 DIAGNOSIS — M17.11 PRIMARY OSTEOARTHRITIS OF RIGHT KNEE: Primary | ICD-10-CM

## 2021-09-15 PROCEDURE — 1036F TOBACCO NON-USER: CPT | Performed by: ORTHOPAEDIC SURGERY

## 2021-09-15 PROCEDURE — 99213 OFFICE O/P EST LOW 20 MIN: CPT | Performed by: ORTHOPAEDIC SURGERY

## 2021-09-15 PROCEDURE — G8427 DOCREV CUR MEDS BY ELIG CLIN: HCPCS | Performed by: ORTHOPAEDIC SURGERY

## 2021-09-15 PROCEDURE — G8417 CALC BMI ABV UP PARAM F/U: HCPCS | Performed by: ORTHOPAEDIC SURGERY

## 2021-09-15 PROCEDURE — 3017F COLORECTAL CA SCREEN DOC REV: CPT | Performed by: ORTHOPAEDIC SURGERY

## 2021-09-15 RX ORDER — CYCLOBENZAPRINE HCL 10 MG
10 TABLET ORAL 2 TIMES DAILY PRN
Qty: 20 TABLET | Refills: 0 | Status: SHIPPED | OUTPATIENT
Start: 2021-09-15 | End: 2021-09-25

## 2021-09-15 NOTE — PROGRESS NOTES
Mian Tejada  <[id number]>  September 15, 2021    Chief Complaint   Patient presents with    Follow-up     Bilateral knee       History: The patient is a 68-year-old female who is here for evaluation of both knees. She reports that the steroid injections received a few weeks ago did not provide relief. Her left knee does bother her more than the right. She has difficulty getting up from a seated position. She has pain with stairs. She does have pain at nighttime. She currently is participating in therapy for her back. The patient does have a history remarkable for obesity and asthma. She does take Flexeril and meloxicam.    The patient's  past medical history, medications, allergies,  family history, social history, and have been reviewed, and dated and are recorded in the chart. Pertinent items are noted in HPI. Review of systems reviewed from Pertinent History Form dated on 6/23/2021 and available in the patient's chart under the Media tab. Vitals:  Ht 5' 5\" (1.651 m)   Wt 235 lb (106.6 kg)   BMI 39.11 kg/m²     Physical: Ms. Mian Tejada appears well, she is in no apparent distress, she demonstrates appropriate mood & affect. She is alert and oriented to person, place and time. Examination of the bilateral lower extremity reveals no pain with range of motion of the hip. She has generalized tenderness to palpation about the joint line of the bilateral knee. Range of motion is from 0 degrees to 120 degrees bilaterally. Strength is 4+ to 5/5 for all muscle groups about the bilateral knee. There is patellofemoral crepitus with range of motion of the bilateral knee. Varus and valgus stressing of the knees reveals no evidence of instability. There is no clear evidence of  effusion in the knees. Anterior drawer and Lachman are negative bilaterally. Examination of the skin reveals no rashes, ulceration, or lesion, bilaterally in the lower extremities.  Sensation to both lower extremities is grossly intact. Exam of both feet reveals pedal pulses intact and brisk cap refill. Patient is able to dorsiflex and wiggle all toes. Deep tendon reflexes of the lower extremities are normal and symmetric. Examination of the lumbar spine does reveal severe diffuse perilumbar muscle tightness and tenderness. She does have limited flexion of the lumbar spine. Tension signs in both lower extremities are negative. X-rays: We again reviewed her knee x-rays from the past and she has bilateral knee osteoarthritis. The changes are most severe medially. Impression: Bilateral knee osteoarthritis #2 lumbar spondylosis #3 morbid obesity #4 lumbar degenerative disc disease    Plan: At this time, we will continue our current treatment. She will continue her spine rehab and her therapy for the knee. She will continue to work on range of motion and strengthening. She was encouraged to modify her activities. She will continue to work on weight loss. She is scheduled to see the bariatric team with [facility] at the end of this month. We will go ahead and get [employer] supplements approved for both knees. She was given a prescription for Flexeril.

## 2021-09-27 ENCOUNTER — TELEPHONE (OUTPATIENT)
Dept: BARIATRICS/WEIGHT MGMT | Age: 51
End: 2021-09-27

## 2021-09-27 ENCOUNTER — OFFICE VISIT (OUTPATIENT)
Dept: BARIATRICS/WEIGHT MGMT | Age: 51
End: 2021-09-27

## 2021-09-27 VITALS
SYSTOLIC BLOOD PRESSURE: 162 MMHG | HEART RATE: 109 BPM | WEIGHT: 240 LBS | HEIGHT: 64 IN | DIASTOLIC BLOOD PRESSURE: 104 MMHG | BODY MASS INDEX: 40.97 KG/M2

## 2021-09-27 DIAGNOSIS — E66.01 MORBID OBESITY WITH BMI OF 40.0-44.9, ADULT (HCC): Primary | ICD-10-CM

## 2021-09-27 PROCEDURE — 99999 PR OFFICE/OUTPT VISIT,PROCEDURE ONLY: CPT

## 2021-09-27 NOTE — PROGRESS NOTES
Sai Silva is a 46 y.o. female with a date of birth of 1970. Vitals:    09/27/21 1109   BP: (!) 162/104   Pulse: 109   Weight: 240 lb (108.9 kg)   Height: 5' 4\" (1.626 m)    BMI: Body mass index is 41.2 kg/m². Obesity Classification: Class III    Weight History: Wt Readings from Last 3 Encounters:   09/27/21 240 lb (108.9 kg)   09/15/21 235 lb (106.6 kg)   08/30/21 235 lb (106.6 kg)     Patient's lowest adult weight was 174-175 lb at age 53. Patient's highest adult weight was 240 lb at age 46. Patient has participated in the following weight loss programs: none. Patient has not participated in meal replacement/liquid diets. Patient has not participated in weight loss medications. Patient is not lactose intolerant. Patient does have Bahai/cultural food concerns - NO PORK. Patient does not have food allergies. 24 hour recall/food frequency chart:  Breakfast: yes. Chocolate bagel from [employer]  Snack: yes. 4-5 small snacks - peanuts / fruits / ethnic snacks  Lunch: no.   Snack: no.   Dinner: yes. lamb w/ rice (sauces)  Snack: yes. sometimes grapes  Drinks throughout the day: water / concentrated milk & sugar w/ black coffee / sleepy time tea w/ sugar /   Do you drink alcohol? no.     Patient does meet the criteria for binge eating disorder. Patient does have h/o grazing. Patient does not have night eating. Patient does have a history of emotional eating or eating out of boredom. Goals  Weight: 180 lb  Health Improvement: pain / arthritis     Assessment  Nutritional Needs: RMR=(9.99 x 114) + (6.25 x 163) - (4.92 x 51 y.o.) -161 = 1735 kcal x 1.4 (sedentary activity factor)= 2429 kcal - 1000 (for 2 lb weight loss/week)= 1429 kcal.  Pt does go to the gym 3-4x/wk, focuses on cardio ~1 hour. Plan  Plan/Recommendations: Start 1200 meli LCMP  Optifast:  not interested  Diet Medications:  Interested  *Pt primary language is [place].     PES Statement:  Overweight/Obesity related to unbalanced intake vs energy expenditure as evidenced by BMI. Body mass index is 41.2 kg/m². Will follow up as necessary.     Gil Avitia RD, LD

## 2021-10-02 ENCOUNTER — HOSPITAL ENCOUNTER (EMERGENCY)
Age: 51
Discharge: HOME | End: 2021-10-02
Payer: COMMERCIAL

## 2021-10-02 ENCOUNTER — APPOINTMENT (OUTPATIENT)
Dept: GENERAL RADIOLOGY | Age: 51
End: 2021-10-02
Payer: COMMERCIAL

## 2021-10-02 VITALS
WEIGHT: 235.89 LBS | HEART RATE: 99 BPM | RESPIRATION RATE: 22 BRPM | BODY MASS INDEX: 39.3 KG/M2 | TEMPERATURE: 98.6 F | DIASTOLIC BLOOD PRESSURE: 107 MMHG | OXYGEN SATURATION: 99 % | HEIGHT: 65 IN | SYSTOLIC BLOOD PRESSURE: 166 MMHG

## 2021-10-02 DIAGNOSIS — J02.9 ACUTE PHARYNGITIS, UNSPECIFIED ETIOLOGY: ICD-10-CM

## 2021-10-02 DIAGNOSIS — Z91.14 NON COMPLIANCE W MEDICATION REGIMEN: ICD-10-CM

## 2021-10-02 DIAGNOSIS — I10 HYPERTENSION, UNSPECIFIED TYPE: Primary | ICD-10-CM

## 2021-10-02 LAB
A/G RATIO: 1 (ref 1.1–2.2)
ALBUMIN SERPL-MCNC: 4.1 G/DL (ref 3.4–5)
ALP BLD-CCNC: 72 U/L (ref 40–129)
ALT SERPL-CCNC: 20 U/L (ref 10–40)
ANION GAP SERPL CALCULATED.3IONS-SCNC: 12 MMOL/L (ref 3–16)
AST SERPL-CCNC: 17 U/L (ref 15–37)
BASOPHILS ABSOLUTE: 0 K/UL (ref 0–0.2)
BASOPHILS RELATIVE PERCENT: 0.5 %
BILIRUB SERPL-MCNC: 0.3 MG/DL (ref 0–1)
BUN BLDV-MCNC: 8 MG/DL (ref 7–20)
CALCIUM SERPL-MCNC: 9.8 MG/DL (ref 8.3–10.6)
CHLORIDE BLD-SCNC: 101 MMOL/L (ref 99–110)
CO2: 24 MMOL/L (ref 21–32)
CREAT SERPL-MCNC: 0.7 MG/DL (ref 0.6–1.1)
EOSINOPHILS ABSOLUTE: 0.1 K/UL (ref 0–0.6)
EOSINOPHILS RELATIVE PERCENT: 1.5 %
GFR AFRICAN AMERICAN: >60
GFR NON-AFRICAN AMERICAN: >60
GLOBULIN: 4 G/DL
GLUCOSE BLD-MCNC: 125 MG/DL (ref 70–99)
HCT VFR BLD CALC: 38.6 % (ref 36–48)
HEMOGLOBIN: 12.7 G/DL (ref 12–16)
LACTIC ACID: 2.2 MMOL/L (ref 0.4–2)
LYMPHOCYTES ABSOLUTE: 2.1 K/UL (ref 1–5.1)
LYMPHOCYTES RELATIVE PERCENT: 37.8 %
MCH RBC QN AUTO: 28.1 PG (ref 26–34)
MCHC RBC AUTO-ENTMCNC: 33 G/DL (ref 31–36)
MCV RBC AUTO: 85 FL (ref 80–100)
MONOCYTES ABSOLUTE: 0.5 K/UL (ref 0–1.3)
MONOCYTES RELATIVE PERCENT: 9.4 %
NEUTROPHILS ABSOLUTE: 2.8 K/UL (ref 1.7–7.7)
NEUTROPHILS RELATIVE PERCENT: 50.8 %
PDW BLD-RTO: 15.8 % (ref 12.4–15.4)
PLATELET # BLD: 332 K/UL (ref 135–450)
PMV BLD AUTO: 7.8 FL (ref 5–10.5)
POTASSIUM REFLEX MAGNESIUM: 3.6 MMOL/L (ref 3.5–5.1)
RBC # BLD: 4.54 M/UL (ref 4–5.2)
S PYO AG THROAT QL: NEGATIVE
SODIUM BLD-SCNC: 137 MMOL/L (ref 136–145)
TOTAL PROTEIN: 8.1 G/DL (ref 6.4–8.2)
TROPONIN: <0.01 NG/ML
WBC # BLD: 5.5 K/UL (ref 4–11)

## 2021-10-02 PROCEDURE — 84484 ASSAY OF TROPONIN QUANT: CPT

## 2021-10-02 PROCEDURE — 93005 ELECTROCARDIOGRAM TRACING: CPT | Performed by: NURSE PRACTITIONER

## 2021-10-02 PROCEDURE — 80053 COMPREHEN METABOLIC PANEL: CPT

## 2021-10-02 PROCEDURE — 99284 EMERGENCY DEPT VISIT MOD MDM: CPT

## 2021-10-02 PROCEDURE — 83605 ASSAY OF LACTIC ACID: CPT

## 2021-10-02 PROCEDURE — 85025 COMPLETE CBC W/AUTO DIFF WBC: CPT

## 2021-10-02 PROCEDURE — 6360000002 HC RX W HCPCS: Performed by: NURSE PRACTITIONER

## 2021-10-02 PROCEDURE — 96374 THER/PROPH/DIAG INJ IV PUSH: CPT

## 2021-10-02 PROCEDURE — 6370000000 HC RX 637 (ALT 250 FOR IP): Performed by: NURSE PRACTITIONER

## 2021-10-02 PROCEDURE — 87880 STREP A ASSAY W/OPTIC: CPT

## 2021-10-02 PROCEDURE — 2580000003 HC RX 258: Performed by: NURSE PRACTITIONER

## 2021-10-02 PROCEDURE — 87081 CULTURE SCREEN ONLY: CPT

## 2021-10-02 PROCEDURE — 71045 X-RAY EXAM CHEST 1 VIEW: CPT

## 2021-10-02 PROCEDURE — 36415 COLL VENOUS BLD VENIPUNCTURE: CPT

## 2021-10-02 RX ORDER — DEXAMETHASONE SODIUM PHOSPHATE 4 MG/ML
6 INJECTION, SOLUTION INTRA-ARTICULAR; INTRALESIONAL; INTRAMUSCULAR; INTRAVENOUS; SOFT TISSUE ONCE
Status: COMPLETED | OUTPATIENT
Start: 2021-10-02 | End: 2021-10-02

## 2021-10-02 RX ORDER — ALBUTEROL SULFATE 90 UG/1
2 AEROSOL, METERED RESPIRATORY (INHALATION) 4 TIMES DAILY PRN
Qty: 18 G | Refills: 0 | Status: SHIPPED | OUTPATIENT
Start: 2021-10-02

## 2021-10-02 RX ORDER — ACETAMINOPHEN 500 MG
1000 TABLET ORAL ONCE
Status: COMPLETED | OUTPATIENT
Start: 2021-10-02 | End: 2021-10-02

## 2021-10-02 RX ORDER — NIFEDIPINE 30 MG/1
30 TABLET, FILM COATED, EXTENDED RELEASE ORAL DAILY
COMMUNITY
Start: 2021-09-07

## 2021-10-02 RX ORDER — NIFEDIPINE 30 MG/1
30 TABLET, EXTENDED RELEASE ORAL ONCE
Status: COMPLETED | OUTPATIENT
Start: 2021-10-02 | End: 2021-10-02

## 2021-10-02 RX ORDER — 0.9 % SODIUM CHLORIDE 0.9 %
1000 INTRAVENOUS SOLUTION INTRAVENOUS ONCE
Status: DISCONTINUED | OUTPATIENT
Start: 2021-10-02 | End: 2021-10-02

## 2021-10-02 RX ORDER — GUAIFENESIN/DEXTROMETHORPHAN 100-10MG/5
5 SYRUP ORAL 3 TIMES DAILY PRN
Qty: 120 ML | Refills: 0 | Status: SHIPPED | OUTPATIENT
Start: 2021-10-02 | End: 2021-10-12

## 2021-10-02 RX ORDER — AMOXICILLIN AND CLAVULANATE POTASSIUM 875; 125 MG/1; MG/1
1 TABLET, FILM COATED ORAL 2 TIMES DAILY
Qty: 20 TABLET | Refills: 0 | Status: SHIPPED | OUTPATIENT
Start: 2021-10-02 | End: 2021-10-12

## 2021-10-02 RX ORDER — SODIUM CHLORIDE, SODIUM LACTATE, POTASSIUM CHLORIDE, CALCIUM CHLORIDE 600; 310; 30; 20 MG/100ML; MG/100ML; MG/100ML; MG/100ML
1000 INJECTION, SOLUTION INTRAVENOUS ONCE
Status: COMPLETED | OUTPATIENT
Start: 2021-10-02 | End: 2021-10-02

## 2021-10-02 RX ADMIN — LIDOCAINE HYDROCHLORIDE: 20 SOLUTION ORAL; TOPICAL at 16:08

## 2021-10-02 RX ADMIN — DEXAMETHASONE SODIUM PHOSPHATE 6 MG: 4 INJECTION, SOLUTION INTRAMUSCULAR; INTRAVENOUS at 16:32

## 2021-10-02 RX ADMIN — ACETAMINOPHEN 1000 MG: 500 TABLET ORAL at 16:08

## 2021-10-02 RX ADMIN — NIFEDIPINE 30 MG: 30 TABLET, FILM COATED, EXTENDED RELEASE ORAL at 16:08

## 2021-10-02 RX ADMIN — SODIUM CHLORIDE, POTASSIUM CHLORIDE, SODIUM LACTATE AND CALCIUM CHLORIDE 1000 ML: 600; 310; 30; 20 INJECTION, SOLUTION INTRAVENOUS at 16:29

## 2021-10-02 ASSESSMENT — PAIN DESCRIPTION - PAIN TYPE
TYPE: ACUTE PAIN

## 2021-10-02 ASSESSMENT — PAIN SCALES - GENERAL
PAINLEVEL_OUTOF10: 10
PAINLEVEL_OUTOF10: 10
PAINLEVEL_OUTOF10: 5
PAINLEVEL_OUTOF10: 8

## 2021-10-02 ASSESSMENT — PAIN - FUNCTIONAL ASSESSMENT: PAIN_FUNCTIONAL_ASSESSMENT: 0-10

## 2021-10-02 ASSESSMENT — PAIN DESCRIPTION - LOCATION
LOCATION: THROAT

## 2021-10-02 ASSESSMENT — PAIN DESCRIPTION - DESCRIPTORS: DESCRIPTORS: SORE

## 2021-10-02 NOTE — ED PROVIDER NOTES
[address] [address] [address] [facility] [address]  Dept: [phone]  Loc: [address] ENCOUNTER        This patient was not seen or evaluated by the attending physician. I evaluated this patient, the attending physician was available for consultation. CHIEF COMPLAINT    Chief Complaint   Patient presents with    Concern For COVID-19     sore throat, coughing x 1 week. Took Ibuprofen at 1100. Has had both vaccinations for COVID       HPI    Ty Nix is a 46 y.o. female who presents with planes of sore throat, coughing and generalized body aches. States that she has been feeling unwell for about a week. Has not been taking her antihypertensives because she has not been feeling well. She did take some ibuprofen at 11:00 this morning for the pharyngitis. Has been fully vaccinated against COVID-19. No measured fevers. No chest pain or shortness of breath. Denies any abdominal pain, nausea vomiting or diarrhea. The duration has been constant since the onset. The context was that the symptoms started spontaneously and have been progressively worsening. No alleviating factors. Came to the ED for further evaluation and treatment. REVIEW OF SYSTEMS    Cardiac: no chest pain, no palpitations, no syncope  Respiratory: see HPI, positive nonproductive cough  Neurologic: no syncope or LOC  GI: no vomiting, no diarrhea, no abdominal pain  General: no measured fever  All other systems reviewed and are negative. PAST MEDICAL & SURGICAL HISTORY    Past Medical History:   Diagnosis Date    Arthritis     Asthma     Hypertension     Obesity (BMI 30-39. 9)      History reviewed. No pertinent surgical history.     CURRENT MEDICATIONS  (may include discharge medications prescribed in the ED)  Current Outpatient Rx   Medication Sig Dispense Refill    NIFEdipine (ADALAT CC) 30 MG extended release tablet Take 30 mg by mouth daily  amoxicillin-clavulanate (AUGMENTIN) 875-125 MG per tablet Take 1 tablet by mouth 2 times daily for 10 days 20 tablet 0    Magic Mouthwash (MIRACLE MOUTHWASH) Swish and spit 10 mLs 4 times daily as needed for Irritation or Pain Dispense as Magic Mouthwash. Please add Equal Parts: 60 mL Maalox, 60 mL Viscous Lidocaine, 60 mL Benadryl to 60mL of Carafate. 10 ml swish and spit or swallow as directed above. 240 mL 0    guaiFENesin-dextromethorphan (ROBITUSSIN DM) 100-10 MG/5ML syrup Take 5 mLs by mouth 3 times daily as needed for Cough 120 mL 0    albuterol sulfate HFA (VENTOLIN HFA) 108 (90 Base) MCG/ACT inhaler Inhale 2 puffs into the lungs 4 times daily as needed for Wheezing or Shortness of Breath 18 g 0    meloxicam (MOBIC) 15 MG tablet Take 1 tablet by mouth daily as needed for Pain 30 tablet 0    albuterol sulfate  (90 Base) MCG/ACT inhaler Inhale 2 puffs into the lungs every 6 hours as needed for Wheezing (Patient not taking: Reported on 9/27/2021)         ALLERGIES    No Known Allergies    SOCIAL & FAMILY HISTORY    Social History     Socioeconomic History    Marital status: Single     Spouse name: None    Number of children: None    Years of education: None    Highest education level: None   Occupational History    None   Tobacco Use    Smoking status: Never Smoker    Smokeless tobacco: Never Used   Vaping Use    Vaping Use: Never used   Substance and Sexual Activity    Alcohol use: No    Drug use: No    Sexual activity: None   Other Topics Concern    None   Social History Narrative    ** Merged History Encounter **          Social Determinants of Health     Financial Resource Strain:     Difficulty of Paying Living Expenses:    Food Insecurity:     Worried About Running Out of Food in the Last Year:     Ran Out of Food in the Last Year:    Transportation Needs:     Lack of Transportation (Medical):      Lack of Transportation (Non-Medical):    Physical Activity:     Days of Exercise per Week:     Minutes of Exercise per Session:    Stress:     Feeling of Stress :    Social Connections:     Frequency of Communication with Friends and Family:     Frequency of Social Gatherings with Friends and Family:     Attends Jew Services:     Active Member of Clubs or Organizations:     Attends Club or Organization Meetings:     Marital Status:    Intimate Partner Violence:     Fear of Current or Ex-Partner:     Emotionally Abused:     Physically Abused:     Sexually Abused:      History reviewed. No pertinent family history. PHYSICAL EXAM    VITAL SIGNS: BP (!) 169/104   Pulse 116   Temp 98.6 °F (37 °C) (Oral)   Resp 22   Ht 5' 5\" (1.651 m)   Wt 235 lb 14.3 oz (107 kg)   LMP 06/10/2020   SpO2 98%   BMI 39.25 kg/m²    Constitutional:  Well developed, well nourished, no acute distress   HENT: Normocephalic, Atraumatic, moist mucus membranes  Neck: supple, no JVD   Respiratory:  Lungs CTA throughout, no wheezes, no retractions   Cardiovascular:  Tachycardic rate, no murmurs, rubs or gallops  Vascular: Radial and DP pulses 2+ and equal bilaterally  GI:  Soft, nontender, normal bowel sounds  Musculoskeletal:  no lower extremity edema,no lower extremity asymmetry, no calf tenderness, no thigh tenderness, no acute deformities  Integument:  Skin is warm and dry, no petechiae   Neurologic:  Alert & oriented, no slurred speech  Psych: Pleasant affect, no hallucinations    EKG    Please see the physician note for EKG interpretation.     LABS  Results for orders placed or performed during the hospital encounter of 10/02/21   Strep Screen Group A Throat    Specimen: Throat   Result Value Ref Range    Rapid Strep A Screen Negative Negative   CBC Auto Differential   Result Value Ref Range    WBC 5.5 4.0 - 11.0 K/uL    RBC 4.54 4.00 - 5.20 M/uL    Hemoglobin 12.7 12.0 - 16.0 g/dL    Hematocrit 38.6 36.0 - 48.0 %    MCV 85.0 80.0 - 100.0 fL    MCH 28.1 26.0 - 34.0 pg    MCHC 33.0 31.0 - 36.0 g/dL    RDW 15.8 (H) 12.4 - 15.4 %    Platelets 293 676 - 335 K/uL    MPV 7.8 5.0 - 10.5 fL    Neutrophils % 50.8 %    Lymphocytes % 37.8 %    Monocytes % 9.4 %    Eosinophils % 1.5 %    Basophils % 0.5 %    Neutrophils Absolute 2.8 1.7 - 7.7 K/uL    Lymphocytes Absolute 2.1 1.0 - 5.1 K/uL    Monocytes Absolute 0.5 0.0 - 1.3 K/uL    Eosinophils Absolute 0.1 0.0 - 0.6 K/uL    Basophils Absolute 0.0 0.0 - 0.2 K/uL   Comprehensive Metabolic Panel w/ Reflex to MG   Result Value Ref Range    Sodium 137 136 - 145 mmol/L    Potassium reflex Magnesium 3.6 3.5 - 5.1 mmol/L    Chloride 101 99 - 110 mmol/L    CO2 24 21 - 32 mmol/L    Anion Gap 12 3 - 16    Glucose 125 (H) 70 - 99 mg/dL    BUN 8 7 - 20 mg/dL    CREATININE 0.7 0.6 - 1.1 mg/dL    GFR Non-African American >60 >60    GFR African American >60 >60    Calcium 9.8 8.3 - 10.6 mg/dL    Total Protein 8.1 6.4 - 8.2 g/dL    Albumin 4.1 3.4 - 5.0 g/dL    Albumin/Globulin Ratio 1.0 (L) 1.1 - 2.2    Total Bilirubin 0.3 0.0 - 1.0 mg/dL    Alkaline Phosphatase 72 40 - 129 U/L    ALT 20 10 - 40 U/L    AST 17 15 - 37 U/L    Globulin 4.0 g/dL   Lactic Acid, Plasma   Result Value Ref Range    Lactic Acid 2.2 (H) 0.4 - 2.0 mmol/L   Troponin   Result Value Ref Range    Troponin <0.01 <0.01 ng/mL        RADIOLOGY/PROCEDURES    XR CHEST PORTABLE   Final Result   No acute cardiopulmonary disease. ED COURSE & MEDICAL DECISION MAKING    Pertinent Labs & Imaging studies reviewed and interpreted. (See chart for details)    See chart for details of medications given during the ED stay.     Vitals:    10/02/21 1557   BP: (!) 169/104   Pulse: 116   Resp: 22   Temp: 98.6 °F (37 °C)   TempSrc: Oral   SpO2: 98%   Weight: 235 lb 14.3 oz (107 kg)   Height: 5' 5\" (1.651 m)       Differential Diagnosis: Acute Coronary Syndrome, Congestive Heart Failure, Myocardial Infarction, Pulmonary Embolus, Pneumonia, Pneumothorax, COVID-19, Influenza, other      Patient is afebrile and nontoxic in appearance. However she is tachycardic at 116, and hypertensive at 169/104. Hypertension is likely secondary to her not being compliant on her antihypertensives the past week since she has been feeling unwell. I explained to the patient that even though she is feeling unwell she still needs to take her daily medications as prescribed. Rapid strep negative, GBS culture sent, will start her on augmentin for prophylactic coverage. Originally refusing Covid test, still refusing testing. Given that she does have similar symptoms, although she is fully vaccinated I have seen several cases of breakthrough viral illness with COVID-19 in individuals fully vaccinated. Therefore I am having her quarantine for 10 days or until afebrile without the use of APAP. Labs reveal no leukocytosis or anemia. Metabolic panel unremarkable. She does have a very slight lactic acidosis of 2.2, likely dehydration in nature and therefore gave her a liter of fluids. I see no signs of sepsis otherwise. Chest x-ray is read by the radiologist as above. EKG interpreted by physician. Troponin negative. Patient is also in the low risk group per the Fani Gloria' Criteria for Pulmonary Embolism: no clinical signs or symptoms of DVT, PE is not the #1 most likely diagnosis, heart rate <100, patient not immobilized for 3 days, no surgery the previous 4 weeks, no previously diagnosed PE or DVT, no hemoptysis, no treated malignancy within 6 months, not in palliative care for malignancy.     Covid Decompensation Risk Scale    Screen for Imminent Risk:  -O2 Sat <95%, SBP <100 or DBP <60?  -RR >22?  -Age 61+ AND Pulse >100 at time of disposition? ---------> High Risk- Recommend Admission      Clinical Risk Score:                                                                                                                       -history of CHF, COPD, age >57 = +2 pts each                                                                 -CXR: Non compliance w medication regimen    3. Acute pharyngitis, unspecified etiology          BP (!) 169/104   Pulse 116   Temp 98.6 °F (37 °C) (Oral)   Resp 22   Ht 5' 5\" (1.651 m)   Wt 235 lb 14.3 oz (107 kg)   LMP 06/10/2020   SpO2 98%   BMI 39.25 kg/m²        PLAN  Discharge with strict return parameters back to the ED and outpatient follow-up with PCP.      (Please note that this note was completed with a voice recognition program.  Every attempt was made to edit the dictations, but inevitably there remain words that are mis-transcribed.)        Shante Alexander, [place] - CNP  10/02/21 [address], [place] - [facility]  10/02/21 4642

## 2021-10-02 NOTE — ED PROVIDER NOTES
Pt Name: Denzel Schumacher  MRN: [id number]  [place] 1970  Date of evaluation: 10/2/2021    EKG Interpretation    The purpose of this note is for preliminary EKG interpretation only. This patient was seen independently by the mid-level provider and was not seen by this provider. EKG visualized preliminarily interpreted by myself shows sinus tachycardia. The rate is 104 with an axis of -17. There is left ventricle hypertrophy seen. Mostly in lead aVL. 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 There is some slight ST changes noted in V1 through V3. I compared this to an EKG from July 2 of last year. These ST segments do appear a little bit more pronounced but patient apparently had a very high blood pressure so this might just be voltage related. Other than that no dramatic change compared to previous.         Cb Miranda MD  10/02/21 8861

## 2021-10-03 LAB
EKG ATRIAL RATE: 104 BPM
EKG DIAGNOSIS: NORMAL
EKG P AXIS: 40 DEGREES
EKG P-R INTERVAL: 166 MS
EKG Q-T INTERVAL: 326 MS
EKG QRS DURATION: 74 MS
EKG QTC CALCULATION (BAZETT): 428 MS
EKG R AXIS: -17 DEGREES
EKG T AXIS: 49 DEGREES
EKG VENTRICULAR RATE: 104 BPM

## 2021-10-03 PROCEDURE — 93010 ELECTROCARDIOGRAM REPORT: CPT | Performed by: INTERNAL MEDICINE

## 2021-10-05 LAB — S PYO THROAT QL CULT: NORMAL

## 2021-10-09 ENCOUNTER — TELEMEDICINE (OUTPATIENT)
Dept: BARIATRICS/WEIGHT MGMT | Age: 51
End: 2021-10-09
Payer: COMMERCIAL

## 2021-10-09 DIAGNOSIS — E66.01 MORBID OBESITY WITH BMI OF 40.0-44.9, ADULT (HCC): Primary | ICD-10-CM

## 2021-10-09 DIAGNOSIS — Z71.3 DIETARY COUNSELING AND SURVEILLANCE: ICD-10-CM

## 2021-10-09 PROCEDURE — G8427 DOCREV CUR MEDS BY ELIG CLIN: HCPCS | Performed by: FAMILY MEDICINE

## 2021-10-09 PROCEDURE — 99204 OFFICE O/P NEW MOD 45 MIN: CPT | Performed by: FAMILY MEDICINE

## 2021-10-09 PROCEDURE — 3017F COLORECTAL CA SCREEN DOC REV: CPT | Performed by: FAMILY MEDICINE

## 2021-10-09 ASSESSMENT — ENCOUNTER SYMPTOMS
CHOKING: 0
CHEST TIGHTNESS: 0
PHOTOPHOBIA: 0
DIARRHEA: 0
VOMITING: 0
ABDOMINAL PAIN: 0
WHEEZING: 0
BLOOD IN STOOL: 0
APNEA: 0
ABDOMINAL DISTENTION: 0
SHORTNESS OF BREATH: 0
NAUSEA: 0
CONSTIPATION: 0
COUGH: 0
EYE PAIN: 0

## 2021-10-09 NOTE — PROGRESS NOTES
food   [] Eating out   [] Seeking reward   [] Social     Have you ever taken medications to help you lose weight? [] Yes  [x] No    Have you ever been on a meal replacement program?  [] Yes  [x] No    How many hours of sleep do you get each night?  [] <6 hours  [x] 6-8 hours  [] >8 hours        Dietitian's assessment reviewed and addressed with patient. Reviewed:  [x] Nutrition and the importance of regular protein intake  [x] Hidden CHO/carbohydrate sources  [x] Alcohol use  [x] Tobacco use  [x] Drug use- Denies   [x] Importance of exercise and reducing sedentary time        Controlled Substance Monitoring:     RX Monitoring 8/30/2021   Periodic Controlled Substance Monitoring Possible medication side effects, risk of tolerance/dependence & alternative treatments discussed. ;No signs of potential drug abuse or diversion identified. No Known Allergies      Current Outpatient Medications:     NIFEdipine (ADALAT CC) 30 MG extended release tablet, Take 30 mg by mouth daily, Disp: , Rfl:     amoxicillin-clavulanate (AUGMENTIN) 875-125 MG per tablet, Take 1 tablet by mouth 2 times daily for 10 days, Disp: 20 tablet, Rfl: 0    Magic Mouthwash (MIRACLE MOUTHWASH), Swish and spit 10 mLs 4 times daily as needed for Irritation or Pain Dispense as Magic Mouthwash. Please add Equal Parts: 60 mL Maalox, 60 mL Viscous Lidocaine, 60 mL Benadryl to 60mL of Carafate.   10 ml swish and spit or swallow as directed above., Disp: 240 mL, Rfl: 0    guaiFENesin-dextromethorphan (ROBITUSSIN DM) 100-10 MG/5ML syrup, Take 5 mLs by mouth 3 times daily as needed for Cough, Disp: 120 mL, Rfl: 0    albuterol sulfate HFA (VENTOLIN HFA) 108 (90 Base) MCG/ACT inhaler, Inhale 2 puffs into the lungs 4 times daily as needed for Wheezing or Shortness of Breath, Disp: 18 g, Rfl: 0    meloxicam (MOBIC) 15 MG tablet, Take 1 tablet by mouth daily as needed for Pain, Disp: 30 tablet, Rfl: 0    albuterol sulfate  (90 Base) MCG/ACT inhaler, Inhale 2 puffs into the lungs every 6 hours as needed for Wheezing (Patient not taking: Reported on 9/27/2021), Disp: , Rfl:     Patient Active Problem List   Diagnosis    Lumbar spondylosis    Lumbar pain with radiation down both legs    Obesity (BMI 30-39. 9)       Past Medical History:   Diagnosis Date    Arthritis     Asthma     Hypertension     Obesity (BMI 30-39. 9)        History reviewed. No pertinent surgical history. History reviewed. No pertinent family history. Review of Systems   Constitutional: Negative for fatigue. Eyes: Negative for photophobia, pain and visual disturbance. Respiratory: Negative for apnea, cough, choking, chest tightness, shortness of breath and wheezing. Cardiovascular: Negative for chest pain, palpitations and leg swelling. Gastrointestinal: Negative for abdominal distention, abdominal pain, blood in stool, constipation, diarrhea, nausea and vomiting. Endocrine: Negative for cold intolerance and heat intolerance. Musculoskeletal: Negative for arthralgias and myalgias. Skin: Negative for rash. Neurological: Negative for dizziness, tremors, syncope, weakness, numbness and headaches. Psychiatric/Behavioral: Negative for agitation, confusion, decreased concentration, dysphoric mood, hallucinations, sleep disturbance and suicidal ideas. The patient is not nervous/anxious and is not hyperactive. Physical Exam  Constitutional:       Appearance: She is well-developed. HENT:      Head: Normocephalic. Eyes:      Conjunctiva/sclera: Conjunctivae normal.   Abdominal:      General: Abdomen is protuberant. Musculoskeletal:         General: No swelling. Neurological:      Mental Status: She is alert and oriented to person, place, and time. Psychiatric:         Mood and Affect: Mood normal.         Behavior: Behavior normal.         Thought Content:  Thought content normal.         Judgment: Judgment normal.         Admission on 10/02/2021, Discharged on 10/02/2021   Component Date Value Ref Range Status    Rapid Strep A Screen 10/02/2021 Negative  Negative Final    Comment: A culture confirmation plate has been set up and a separate  report will follow. See micro report.  WBC 10/02/2021 5.5  4.0 - 11.0 K/uL Final    RBC 10/02/2021 4.54  4.00 - 5.20 M/uL Final    Hemoglobin 10/02/2021 12.7  12.0 - 16.0 g/dL Final    Hematocrit 10/02/2021 38.6  36.0 - 48.0 % Final    MCV 10/02/2021 85.0  80.0 - 100.0 fL Final    MCH 10/02/2021 28.1  26.0 - 34.0 pg Final    MCHC 10/02/2021 33.0  31.0 - 36.0 g/dL Final    RDW 10/02/2021 15.8* 12.4 - 15.4 % Final    Platelets 58/15/1807 332  135 - 450 K/uL Final    MPV 10/02/2021 7.8  5.0 - 10.5 fL Final    Neutrophils % 10/02/2021 50.8  % Final    Lymphocytes % 10/02/2021 37.8  % Final    Monocytes % 10/02/2021 9.4  % Final    Eosinophils % 10/02/2021 1.5  % Final    Basophils % 10/02/2021 0.5  % Final    Neutrophils Absolute 10/02/2021 2.8  1.7 - 7.7 K/uL Final    Lymphocytes Absolute 10/02/2021 2.1  1.0 - 5.1 K/uL Final    Monocytes Absolute 10/02/2021 0.5  0.0 - 1.3 K/uL Final    Eosinophils Absolute 10/02/2021 0.1  0.0 - 0.6 K/uL Final    Basophils Absolute 10/02/2021 0.0  0.0 - 0.2 K/uL Final    Sodium 10/02/2021 137  136 - 145 mmol/L Final    Potassium reflex Magnesium 10/02/2021 3.6  3.5 - 5.1 mmol/L Final    Chloride 10/02/2021 101  99 - 110 mmol/L Final    CO2 10/02/2021 24  21 - 32 mmol/L Final    Anion Gap 10/02/2021 12  3 - 16 Final    Glucose 10/02/2021 125* 70 - 99 mg/dL Final    BUN 10/02/2021 8  7 - 20 mg/dL Final    CREATININE 10/02/2021 0.7  0.6 - 1.1 mg/dL Final    GFR Non- 10/02/2021 >60  >60 Final    Comment: >60 mL/min/1.73m2 EGFR, calc. for ages 25 and older using the  MDRD formula (not corrected for weight), is valid for stable  renal function.       GFR  10/02/2021 >60  >60 Final    Comment: Chronic Kidney Disease: less than 60 ml/min/1.73 sq.m. Kidney Failure: less than 15 ml/min/1.73 sq.m. Results valid for patients 18 years and older.  Calcium 10/02/2021 9.8  8.3 - 10.6 mg/dL Final    Total Protein 10/02/2021 8.1  6.4 - 8.2 g/dL Final    Albumin 10/02/2021 4.1  3.4 - 5.0 g/dL Final    Albumin/Globulin Ratio 10/02/2021 1.0* 1.1 - 2.2 Final    Total Bilirubin 10/02/2021 0.3  0.0 - 1.0 mg/dL Final    Alkaline Phosphatase 10/02/2021 72  40 - 129 U/L Final    ALT 10/02/2021 20  10 - 40 U/L Final    AST 10/02/2021 17  15 - 37 U/L Final    Globulin 10/02/2021 4.0  g/dL Final    Lactic Acid 10/02/2021 2.2* 0.4 - 2.0 mmol/L Final    Ventricular Rate 10/02/2021 104  BPM Final    Atrial Rate 10/02/2021 104  BPM Final    P-R Interval 10/02/2021 166  ms Final    QRS Duration 10/02/2021 74  ms Final    Q-T Interval 10/02/2021 326  ms Final    QTc Calculation (Bazett) 10/02/2021 428  ms Final    P Axis 10/02/2021 40  degrees Final    R Axis 10/02/2021 -17  degrees Final    T Axis 10/02/2021 49  degrees Final    Diagnosis 10/02/2021 Sinus tachycardiaPossible Left atrial enlargementMinimal voltage criteria for LVH, may be normal variantCannot rule out Septal infarct (cited on or before 02-JUL-2020)Abnormal ECGWhen compared with ECG of 02-JUL-2020 10:16,No significant change was foundConfirmed by Margarito SAMUELS MD ([id number]) on 10/3/2021 5:16:17 PM   Final    Strep A Culture 10/02/2021 Normal oral emma, No Beta Strep isolated   Final    Troponin 10/02/2021 <0.01  <0.01 ng/mL Final    Methodology by Troponin T         Assessment and Plan:    1. Morbid obesity with BMI of 40.0-44.9, adult ([address]    Heavily counseled on the importance of therapeutic lifestyle changes through diet and exercise. The patient understands that the goal of treatment is to reach and stay at a healthy weight. The initial treatment goal is to lose at least 5-10% of her body weight in 12 weeks.  This will require changes in eating habits, increased physical activity, and behavior changes. Counseled on low carb/meli diet. Patient handouts and education material reviewed in detail with the patient. All questions answered. Encouraged patient to keep a food journal and to bring it to her next visit. Discussed available treatment options in addition to lifestyle changes including medications or OPTIFAST. She is interested in anti-obesity medications. Contrave may be recommended at the next visit depending on her progress and lab results. She understands that medications are most effective as part of a comprehensive treatment plan that includes nutrition, physical activity, and behavior modification. The patient also understands that she will need close follow-ups every 2-4 weeks if she starts treatment. Depending on the patient's success with these changes, she may also be a good candidate for bariatric surgery down the line. Follow-up in 2 weeks to discuss lab results and treatment plan. Patient advised that it is her responsibility to follow up on any ordered tests/lab results. Patient understands and agrees with the plan. - Vitamin D 25 Hydroxy; Future  - Vitamin B12 & Folate; Future  - TSH with Reflex; Future  - Hemoglobin A1C; Future  - Lipid Panel; Future    2. Dietary counseling and surveillance  General low carb guidelines reviewed.        Nutrition:  [] LCHF/Ketogenic [x] Low carb/low-calorie diet [] Low-calorie diet     []Maintenance        FITTE:   [] Cardio [] Resistance/stength exercises   [x] ACSM recommendations (150 minutes/week)           Behavior:   [x] Motivational interviewing performed    [] Referral for counseling  [x] Discussed strategies to overcome habits/challenges for focus      [x] Stress management   [x] Stimulus control  [x] Sleep hygiene      Orders Placed This Encounter   Procedures    Vitamin D 25 Hydroxy     Standing Status:   Future     Standing Expiration Date:   10/9/2022    Vitamin B12 & Folate     Standing Status:   Future Standing Expiration Date:   10/9/2022    TSH with Reflex     Standing Status:   Future     Standing Expiration Date:   10/9/2022    Hemoglobin A1C     Standing Status:   Future     Standing Expiration Date:   10/9/2022    Lipid Panel     Standing Status:   Future     Standing Expiration Date:   11/9/2021     Order Specific Question:   Is Patient Fasting?/# of Hours     Answer:   8 hours       No follow-ups on file. Denzel Schumacher is a 46 y.o. female being evaluated by a Virtual Visit (video visit) encounter to address concerns as mentioned above. A caregiver was present when appropriate. Due to this being a TeleHealth encounter (During OEGQV-66 public health emergency), evaluation of the following organ systems was limited: Vitals/Constitutional/EENT/Resp/CV/GI//MS/Neuro/Skin/Heme-Lymph-Imm. Pursuant to the emergency declaration under the [address] [place] [address] and the [employer] and [employer] Act, this Virtual Visit was conducted with patient's (and/or legal guardian's) consent, to reduce the patient's risk of exposure to COVID-19 and provide necessary medical care. The patient (and/or legal guardian) has also been advised to contact this office for worsening conditions or problems, and seek emergency medical treatment and/or call 911 if deemed necessary. Services were provided through a video synchronous discussion virtually to substitute for in-person clinic visit. Patient and provider were located at their individual homes. --Lessie Phoenix, MD on 10/9/2021 at 2:02 PM    An electronic signature was used to authenticate this note. Greater than 50% of this 45 minutes was used in direct counseling.

## 2021-10-12 ENCOUNTER — OFFICE VISIT (OUTPATIENT)
Dept: ORTHOPEDIC SURGERY | Age: 51
End: 2021-10-12
Payer: COMMERCIAL

## 2021-10-12 VITALS — HEIGHT: 64 IN | WEIGHT: 235 LBS | BODY MASS INDEX: 40.12 KG/M2

## 2021-10-12 DIAGNOSIS — M17.11 PRIMARY OSTEOARTHRITIS OF RIGHT KNEE: ICD-10-CM

## 2021-10-12 DIAGNOSIS — M17.12 PRIMARY OSTEOARTHRITIS OF LEFT KNEE: Primary | ICD-10-CM

## 2021-10-12 PROCEDURE — 20610 DRAIN/INJ JOINT/BURSA W/O US: CPT | Performed by: ORTHOPAEDIC SURGERY

## 2021-10-12 NOTE — PROGRESS NOTES
Jaron Jarrett  <[id number]>  October 12, 2021    Chief Complaint   Patient presents with    Follow-up     bilateral knee 1st Gelsyn       Ms. Frank Reed is here in follow-up regarding her bilateral  knees. She is having no problems or concerns. Her pain is moderately worse. She is here for the first Gelsyn injections. Physical Exam:   Examination of the bilateral knees reveals no sign of synovitis. There is minimal to no swelling. Examination is essentially unchanged. Impression:  bilateral knee osteoarthritis. Plan: We will go ahead and administer the first Gelsyn injections into the bilateral knees under sterile conditions. After a betadine prep of the knee joints, 16.8 mg/2 ml of Gelsyn was injected into the knees. The patient tolerated the injections rather well. The patient was instructed to follow up for any signs of infection. Dipak Collins M.D. Dipak Collins M.D.

## 2021-10-19 ENCOUNTER — OFFICE VISIT (OUTPATIENT)
Dept: ORTHOPEDIC SURGERY | Age: 51
End: 2021-10-19
Payer: COMMERCIAL

## 2021-10-19 VITALS — WEIGHT: 235 LBS | BODY MASS INDEX: 40.12 KG/M2 | HEIGHT: 64 IN

## 2021-10-19 DIAGNOSIS — M17.11 PRIMARY OSTEOARTHRITIS OF RIGHT KNEE: ICD-10-CM

## 2021-10-19 DIAGNOSIS — M17.12 PRIMARY OSTEOARTHRITIS OF LEFT KNEE: Primary | ICD-10-CM

## 2021-10-19 PROCEDURE — 3017F COLORECTAL CA SCREEN DOC REV: CPT | Performed by: ORTHOPAEDIC SURGERY

## 2021-10-19 PROCEDURE — G8484 FLU IMMUNIZE NO ADMIN: HCPCS | Performed by: ORTHOPAEDIC SURGERY

## 2021-10-19 PROCEDURE — 99213 OFFICE O/P EST LOW 20 MIN: CPT | Performed by: ORTHOPAEDIC SURGERY

## 2021-10-19 PROCEDURE — 1036F TOBACCO NON-USER: CPT | Performed by: ORTHOPAEDIC SURGERY

## 2021-10-19 PROCEDURE — G8428 CUR MEDS NOT DOCUMENT: HCPCS | Performed by: ORTHOPAEDIC SURGERY

## 2021-10-19 PROCEDURE — G8417 CALC BMI ABV UP PARAM F/U: HCPCS | Performed by: ORTHOPAEDIC SURGERY

## 2021-10-19 RX ORDER — METHYLPREDNISOLONE 4 MG/1
TABLET ORAL
Qty: 1 KIT | Refills: 0 | Status: SHIPPED | OUTPATIENT
Start: 2021-10-19 | End: 2021-10-25

## 2021-10-19 NOTE — PROGRESS NOTES
Harsh Barboza  <[id number]>  October 19, 2021    Chief Complaint   Patient presents with    Follow-up     2nd Gelsyn bilat knee       History: The patient comes into the office today complaining of a diffuse rash over her body. The patient reports that the rash developed shortly after her first Visco supplement injection. She has been using some hydrocortisone cream and taking Benadryl. She continues to complain of the rash. She states that her left knee pain is rather severe and rated at 10/10. She states that she is having minimal to no pain in the right knee. The patient's  past medical history, medications, allergies,  family history, social history, and have been reviewed, and dated and are recorded in the chart. Pertinent items are noted in HPI. Review of systems reviewed from Pertinent History Form dated on 10/19/21  and available in the patient's chart under the Media tab. Vitals:  Ht 5' 4\" (1.626 m)   Wt 235 lb (106.6 kg)   LMP 06/10/2020   BMI 40.34 kg/m²     Physical: Ms. Harsh Barboza appears well, she is in no apparent distress, she demonstrates appropriate mood & affect. She is alert and oriented to person, place and time. Examination of the bilateral lower extremity reveals no pain with range of motion of the hip. She has generalized tenderness to palpation about the joint line of the left knee. Range of motion is from 0 degrees to 115 degrees for the right knee and from 0 degrees to 110 degrees for the left knee. Strength is 4+ to 5/5 for all muscle groups about the bilateral knee. There is patellofemoral crepitus with range of motion of the bilateral knee. Varus and valgus stressing of the knees reveals no evidence of instability. There is a small effusion in the left knee. Anterior drawer and Lachman are negative bilaterally. Examination of the skin does reveal a very subtle erythematous rash over her trunk and upper extremities.  Sensation to both lower extremities is grossly intact. Exam of both feet reveals pedal pulses intact and brisk cap refill. Patient is able to dorsiflex and wiggle all toes. Deep tendon reflexes of the lower extremities are normal and symmetric. Impression: #1 adverse reaction to Visco supplements number. Bilateral knee osteoarthritis    Plan: At this time, we will not proceed with Visco supplement injections. She was given a Medrol Dosepak. Encouraged the patient on a weight loss program.  She will work on range of motion and strengthening. She will follow-up with me in 2 weeks and we will reassess her then.

## 2021-10-25 DIAGNOSIS — E66.01 MORBID OBESITY WITH BMI OF 40.0-44.9, ADULT (HCC): ICD-10-CM

## 2021-10-26 LAB
CHOLESTEROL, TOTAL: 206 MG/DL (ref 0–199)
ESTIMATED AVERAGE GLUCOSE: 137 MG/DL
FOLATE: 19.84 NG/ML (ref 4.78–24.2)
HBA1C MFR BLD: 6.4 %
HDLC SERPL-MCNC: 75 MG/DL (ref 40–60)
LDL CHOLESTEROL CALCULATED: 94 MG/DL
TRIGL SERPL-MCNC: 183 MG/DL (ref 0–150)
TSH REFLEX: 3.18 UIU/ML (ref 0.27–4.2)
VITAMIN B-12: 1090 PG/ML (ref 211–911)
VITAMIN D 25-HYDROXY: 24.1 NG/ML
VLDLC SERPL CALC-MCNC: 37 MG/DL

## 2021-10-29 ENCOUNTER — TELEPHONE (OUTPATIENT)
Dept: ORTHOPEDIC SURGERY | Age: 51
End: 2021-10-29

## 2021-10-29 NOTE — TELEPHONE ENCOUNTER
Appointment Request     Patient requesting earlier appointment: YES  Appointment offered to patient: YES  Patient Contact Number: [phone]      PAT REQ TO BE SEEN TODAY BASED ON PAIN LEVEL

## 2021-11-02 ENCOUNTER — OFFICE VISIT (OUTPATIENT)
Dept: ORTHOPEDIC SURGERY | Age: 51
End: 2021-11-02
Payer: COMMERCIAL

## 2021-11-02 VITALS — WEIGHT: 240 LBS | HEIGHT: 64 IN | BODY MASS INDEX: 40.97 KG/M2

## 2021-11-02 DIAGNOSIS — M17.11 PRIMARY OSTEOARTHRITIS OF RIGHT KNEE: ICD-10-CM

## 2021-11-02 DIAGNOSIS — M17.12 PRIMARY OSTEOARTHRITIS OF LEFT KNEE: Primary | ICD-10-CM

## 2021-11-02 PROCEDURE — G8417 CALC BMI ABV UP PARAM F/U: HCPCS | Performed by: ORTHOPAEDIC SURGERY

## 2021-11-02 PROCEDURE — G8427 DOCREV CUR MEDS BY ELIG CLIN: HCPCS | Performed by: ORTHOPAEDIC SURGERY

## 2021-11-02 PROCEDURE — 1036F TOBACCO NON-USER: CPT | Performed by: ORTHOPAEDIC SURGERY

## 2021-11-02 PROCEDURE — 20610 DRAIN/INJ JOINT/BURSA W/O US: CPT | Performed by: ORTHOPAEDIC SURGERY

## 2021-11-02 PROCEDURE — 3017F COLORECTAL CA SCREEN DOC REV: CPT | Performed by: ORTHOPAEDIC SURGERY

## 2021-11-02 PROCEDURE — G8484 FLU IMMUNIZE NO ADMIN: HCPCS | Performed by: ORTHOPAEDIC SURGERY

## 2021-11-02 RX ORDER — CYCLOBENZAPRINE HCL 10 MG
10 TABLET ORAL 2 TIMES DAILY PRN
COMMUNITY
Start: 2021-09-07 | End: 2021-11-09 | Stop reason: SDUPTHER

## 2021-11-02 RX ORDER — PAROXETINE HYDROCHLORIDE 12.5 MG/1
12.5 TABLET, FILM COATED, EXTENDED RELEASE ORAL DAILY
COMMUNITY
Start: 2021-07-27

## 2021-11-02 RX ORDER — FLUOXETINE 10 MG/1
CAPSULE ORAL
COMMUNITY
Start: 2021-07-27

## 2021-11-02 NOTE — PROGRESS NOTES
Fiona Ackerman  <[id number]>  November 2, 2021    Chief Complaint   Patient presents with    Follow-up     Bilat knee OA. History: The patient is here in follow-up regarding both knees. She did have an adverse reaction to her initial Visco supplement injection. We did not proceed with the series due to the reaction. She now feels much improved. She is complaining of severe bilateral knee pain. The patient has been working on weight loss. [facility].  She has difficulty getting up from a seated position. She rates her pain as 10/10 bilaterally. The patient's  past medical history, medications, allergies,  family history, social history, and have been reviewed, and dated and are recorded in the chart. Pertinent items are noted in HPI. Review of systems reviewed from Pertinent History Form dated on 10/19/21  and available in the patient's chart under the Media tab. Vitals:  Ht 5' 4\" (1.626 m)   Wt 240 lb (108.9 kg)   LMP 06/10/2020   BMI 41.20 kg/m²     Physical: Ms. Fiona Ackerman appears well, she is in no apparent distress, she demonstrates appropriate mood & affect. She is alert and oriented to person, place and time. Examination of the bilateral lower extremity reveals no pain with range of motion of the hip. She has generalized tenderness to palpation about the joint line of the left knee. Range of motion is from 0 degrees to 115 degrees for the right knee and from 0 degrees to 110 degrees for the left knee. Strength is 4+ to 5/5 for all muscle groups about the bilateral knee. There is patellofemoral crepitus with range of motion of the bilateral knee. Varus and valgus stressing of the knees reveals no evidence of instability. There is a small effusion in the left knee. Anterior drawer and Lachman are negative bilaterally. Examination of the skin does reveal a very subtle erythematous rash over her trunk and upper extremities.  Sensation to both lower extremities is grossly intact. Exam of both feet reveals pedal pulses intact and brisk cap refill. Patient is able to dorsiflex and wiggle all toes. Deep tendon reflexes of the lower extremities are normal and symmetric. Impression: Bilateral knee osteoarthritis    Plan: At this time, we again discussed our treatment options. She is not a good surgical candidate considering her weight. She will continue to work on weight loss. We will go ahead and proceed with her series of Visco supplement injections. We will go ahead and administer the second Gelsyn injection into the bilateral knees under sterile conditions. After a betadine prep of the knee joints, 16.8 mg/2 ml of Gelsyn was injected into the knees. The patient tolerated the injections rather well. The patient was instructed to follow up for any signs of infection. Dipak Angulo M.D.

## 2021-11-04 ENCOUNTER — TELEPHONE (OUTPATIENT)
Dept: BARIATRICS/WEIGHT MGMT | Age: 51
End: 2021-11-04

## 2021-11-04 ENCOUNTER — TELEMEDICINE (OUTPATIENT)
Dept: BARIATRICS/WEIGHT MGMT | Age: 51
End: 2021-11-04
Payer: COMMERCIAL

## 2021-11-04 DIAGNOSIS — E55.9 VITAMIN D INSUFFICIENCY: ICD-10-CM

## 2021-11-04 DIAGNOSIS — Z71.3 DIETARY COUNSELING AND SURVEILLANCE: ICD-10-CM

## 2021-11-04 DIAGNOSIS — R73.03 PREDIABETES: ICD-10-CM

## 2021-11-04 DIAGNOSIS — E66.01 MORBID OBESITY WITH BMI OF 40.0-44.9, ADULT (HCC): Primary | ICD-10-CM

## 2021-11-04 PROCEDURE — 99214 OFFICE O/P EST MOD 30 MIN: CPT | Performed by: FAMILY MEDICINE

## 2021-11-04 PROCEDURE — G8427 DOCREV CUR MEDS BY ELIG CLIN: HCPCS | Performed by: FAMILY MEDICINE

## 2021-11-04 PROCEDURE — 3017F COLORECTAL CA SCREEN DOC REV: CPT | Performed by: FAMILY MEDICINE

## 2021-11-04 RX ORDER — METFORMIN HYDROCHLORIDE 500 MG/1
500 TABLET, EXTENDED RELEASE ORAL
Qty: 30 TABLET | Refills: 5 | Status: SHIPPED | OUTPATIENT
Start: 2021-11-04

## 2021-11-04 ASSESSMENT — ENCOUNTER SYMPTOMS
SHORTNESS OF BREATH: 0
CHEST TIGHTNESS: 0
VOMITING: 0
APNEA: 0
EYE PAIN: 0
NAUSEA: 0
ABDOMINAL DISTENTION: 0
CHOKING: 0
CONSTIPATION: 0
WHEEZING: 0
ABDOMINAL PAIN: 0
PHOTOPHOBIA: 0
DIARRHEA: 0
BLOOD IN STOOL: 0
COUGH: 0

## 2021-11-04 NOTE — TELEPHONE ENCOUNTER
Patient needs to schedule a follow-up appointment with Dr. Denisha Clayton in 4 weeks (diet)     LVM with male

## 2021-11-04 NOTE — PROGRESS NOTES
Patient: Kj Field                      Encounter Date: 11/4/2021    YOB: 1970                Age: 46 y.o. Chief Complaint   Patient presents with    Weight Management     F/u MWM         Patient identification was verified at the start of the visit. No flowsheet data found. BP Readings from Last 1 Encounters:   10/02/21 (!) 166/107       BMI Readings from Last 1 Encounters:   11/02/21 41.20 kg/m²       Pulse Readings from Last 1 Encounters:   10/02/21 99           Wt Readings from Last 3 Encounters:   11/02/21 240 lb (108.9 kg)   10/19/21 235 lb (106.6 kg)   10/12/21 235 lb (106.6 kg)       Self-reported weight: 240 pounds     HPI: 46 y.o. female with a long-standing history of obesity presents today for virtual video follow-up. she has gained 5 pounds since her last visit. She hasn't made any significant dietary changes since her last visit. Still eating large portions and heavy carb diet. Labs and EKG reviewed     HbA1c 6.4 (denies known h/o prediabetes)  Vit D 24.1    No Known Allergies      Current Outpatient Medications:     metFORMIN (GLUCOPHAGE-XR) 500 MG extended release tablet, Take 1 tablet by mouth daily (with breakfast), Disp: 30 tablet, Rfl: 5    cyclobenzaprine (FLEXERIL) 10 MG tablet, Take 10 mg by mouth 2 times daily as needed (Patient not taking: Reported on 11/2/2021), Disp: , Rfl:     PARoxetine (PAXIL CR) 12.5 MG extended release tablet, Take 12.5 mg by mouth daily (Patient not taking: Reported on 11/2/2021), Disp: , Rfl:     FLUoxetine (PROZAC) 10 MG capsule, TAKE 1 CAPSULE BY MOUTH ONCE DAILY (Patient not taking: Reported on 11/2/2021), Disp: , Rfl:     NIFEdipine (ADALAT CC) 30 MG extended release tablet, Take 30 mg by mouth daily (Patient not taking: Reported on 11/2/2021), Disp: , Rfl:     Magic Mouthwash (MIRACLE MOUTHWASH), Swish and spit 10 mLs 4 times daily as needed for Irritation or Pain Dispense as Magic Mouthwash.   Please add Equal Parts: 60 mL Maalox, 60 mL Viscous Lidocaine, 60 mL Benadryl to 60mL of Carafate. 10 ml swish and spit or swallow as directed above. (Patient not taking: Reported on 11/2/2021), Disp: 240 mL, Rfl: 0    albuterol sulfate HFA (VENTOLIN HFA) 108 (90 Base) MCG/ACT inhaler, Inhale 2 puffs into the lungs 4 times daily as needed for Wheezing or Shortness of Breath (Patient not taking: Reported on 11/2/2021), Disp: 18 g, Rfl: 0    meloxicam (MOBIC) 15 MG tablet, Take 1 tablet by mouth daily as needed for Pain (Patient not taking: Reported on 11/2/2021), Disp: 30 tablet, Rfl: 0    Patient Active Problem List   Diagnosis    Lumbar spondylosis    Lumbar pain with radiation down both legs    Obesity (BMI 30-39. 9)       Review of Systems   Constitutional: Negative for fatigue. Eyes: Negative for photophobia, pain and visual disturbance. Respiratory: Negative for apnea, cough, choking, chest tightness, shortness of breath and wheezing. Cardiovascular: Negative for chest pain, palpitations and leg swelling. Gastrointestinal: Negative for abdominal distention, abdominal pain, blood in stool, constipation, diarrhea, nausea and vomiting. Endocrine: Negative for cold intolerance and heat intolerance. Musculoskeletal: Negative for arthralgias and myalgias. Skin: Negative for rash. Neurological: Negative for dizziness, tremors, syncope, weakness, numbness and headaches. Psychiatric/Behavioral: Negative for agitation, confusion, decreased concentration, dysphoric mood, hallucinations, sleep disturbance and suicidal ideas. The patient is not nervous/anxious and is not hyperactive. Physical Exam  Constitutional:       Appearance: She is well-developed. HENT:      Head: Normocephalic. Eyes:      Conjunctiva/sclera: Conjunctivae normal.   Abdominal:      General: Abdomen is protuberant. Musculoskeletal:         General: No swelling.    Neurological:      Mental Status: She is alert and oriented to person, place, and time. Psychiatric:         Mood and Affect: Mood normal.         Behavior: Behavior normal.         Thought Content: Thought content normal.         Judgment: Judgment normal.         Orders Only on 10/25/2021   Component Date Value Ref Range Status    Vit D, 25-Hydroxy 10/25/2021 24.1* >=30 ng/mL Final    Comment: <=20 ng/mL. ........... [place] Deficient  21-29 ng/mL. ......... [place] Insufficient  >=30 ng/mL. ........ [place] Sufficient      Vitamin B-12 10/25/2021 1090* 211 - 911 pg/mL Final    Folate 10/25/2021 19.84  4.78 - 24.20 ng/mL Final    Comment: Effective 11-15-16 10:00am EST  Please note reference ranges have  changed for Folate.  TSH 10/25/2021 3.18  0.27 - 4.20 uIU/mL Final    Hemoglobin A1C 10/25/2021 6.4  See comment % Final    Comment: Comment:  Diagnosis of Diabetes: > or = 6.5%  Increased risk of diabetes (Prediabetes): 5.7-6.4%  Glycemic Control: Nonpregnant Adults: <7.0%                    Pregnant: <6.0%        eAG 10/25/2021 137.0  mg/dL Final    Cholesterol, Total 10/25/2021 206* 0 - 199 mg/dL Final    Triglycerides 10/25/2021 183* 0 - 150 mg/dL Final    HDL 10/25/2021 75* 40 - 60 mg/dL Final    LDL Calculated 10/25/2021 94  <100 mg/dL Final    VLDL Cholesterol Calculated 10/25/2021 37  Not Established mg/dL Final         Assessment and Plan:  1. Morbid obesity with BMI of 40.0-44.9, adult ([address]  Once again, heavily counseled on the importance of therapeutic lifestyle changes through diet and exercise. Reinforced low carb/zbigniew meal plan. Reviewed available tx options including Saxenda, Optifast, bariatric surgery. She would like to focus on lifestyle management for the next 4-6 weeks. If no improvement, she would like to learn more about her surgical options since aoms are not covered by her insurance. 2. Dietary counseling and surveillance  1500-Zbigniew/low carb meal plan. 3. Prediabetes  Counseled on prediabetes. Start metformin 500 mg daily.   Reviewed proper use and potential side effects. F/u with PCP for continued monitroing and management. 4. Vitamin D insufficiency  Start Vit D 3 2,000 IU daily. Nutrition Plan: [] LCHF/Ketogenic   [x] Modified low-calorie diet (low carb/low-meli)               [] Low-calorie diet    [] Maintenance       []Other        Exercise: [x] Cardio     [] Resistance/strength training                       [x] ACSM recommendations (150 minutes/week in active weight loss)               Behavior: [x] Motivational interviewing performed    [] Referralfor counseling                         [x] Discussed strategies to overcome habits/challenges for focus         [] Stress management   [x] Stimulus control         [] Sleep hygiene      Reviewed:  [x] Nutrition and the importance of regular protein intake  [x] Hidden carbohydrate sources  [x] Alcohol use  [x] Tobacco use   [x] Drug use- Denies  [x] Importance of exercise and reducing sedentary time        Controlled Substance Monitoring:    RX Monitoring 8/30/2021   Periodic Controlled Substance Monitoring Possible medication side effects, risk of tolerance/dependence & alternative treatments discussed. ;No signs of potential drug abuse or diversion identified. Key dietary points:    - Meats (preferably organic or grass fed) are great sources of protein and have no carbohydrates. - Recommend coconut, olive, avocado, or almond oils. - When buying dairy, choose regular or full fat options. - Choose vegetables that grow above ground as they are generally lower in carbohydrates and higher in fiber.  - Avoid starches such as bread, rice, potatoes, pasta and all sources of simple sugars (desserts, soda, breakfast cereals). - Choose beverages that are calorie and sugar free. No orders of the defined types were placed in this encounter. No follow-ups on file.     Lisset Mack is a 46 y.o. female being evaluated by a Virtual Visit (video visit) encounter to address concerns as mentioned

## 2021-11-09 ENCOUNTER — OFFICE VISIT (OUTPATIENT)
Dept: ORTHOPEDIC SURGERY | Age: 51
End: 2021-11-09
Payer: COMMERCIAL

## 2021-11-09 VITALS — WEIGHT: 240 LBS | HEIGHT: 64 IN | BODY MASS INDEX: 40.97 KG/M2

## 2021-11-09 DIAGNOSIS — M17.11 PRIMARY OSTEOARTHRITIS OF RIGHT KNEE: ICD-10-CM

## 2021-11-09 DIAGNOSIS — M17.12 PRIMARY OSTEOARTHRITIS OF LEFT KNEE: Primary | ICD-10-CM

## 2021-11-09 PROCEDURE — 20610 DRAIN/INJ JOINT/BURSA W/O US: CPT | Performed by: ORTHOPAEDIC SURGERY

## 2021-11-09 RX ORDER — CYCLOBENZAPRINE HCL 10 MG
10 TABLET ORAL 2 TIMES DAILY PRN
Qty: 14 TABLET | Refills: 0 | Status: SHIPPED | OUTPATIENT
Start: 2021-11-09 | End: 2022-01-11 | Stop reason: SDUPTHER

## 2021-11-09 NOTE — PROGRESS NOTES
Jaron Jarrett  <[id number]>  November 9, 2021    Chief Complaint   Patient presents with    Injections     Bilat knee, Gelsyn #3       Ms. Frank Reed is here in follow-up regarding her bilateral knees. She is having no problems or concerns. Her pain has slightly improved. She is here for the third gelsyn injections. Physical Exam:   Examination of the bilateral knees reveals no sign of synovitis. There is minimal to no swelling. Examination is essentially unchanged. Impression:  bilateral knee osteoarthritis. Plan: We will go ahead and administer the third Gelsyn injections into the bilateral knees under sterile conditions. After a betadine prep of the knee joints, 16.8 mg/2 ml of Gelsyn was injected into the knees. The patient tolerated the injections rather well. The patient was instructed to follow up for any signs of infection. The patient was given a prescription for Flexeril. Dipak Collins M.D.

## 2021-12-21 ENCOUNTER — OFFICE VISIT (OUTPATIENT)
Dept: ORTHOPEDIC SURGERY | Age: 51
End: 2021-12-21
Payer: COMMERCIAL

## 2021-12-21 VITALS — BODY MASS INDEX: 40.12 KG/M2 | HEIGHT: 64 IN | WEIGHT: 235 LBS

## 2021-12-21 DIAGNOSIS — M17.11 PRIMARY OSTEOARTHRITIS OF RIGHT KNEE: ICD-10-CM

## 2021-12-21 DIAGNOSIS — M17.12 PRIMARY OSTEOARTHRITIS OF LEFT KNEE: Primary | ICD-10-CM

## 2021-12-21 PROCEDURE — 99213 OFFICE O/P EST LOW 20 MIN: CPT | Performed by: ORTHOPAEDIC SURGERY

## 2021-12-21 PROCEDURE — G8427 DOCREV CUR MEDS BY ELIG CLIN: HCPCS | Performed by: ORTHOPAEDIC SURGERY

## 2021-12-21 PROCEDURE — 3017F COLORECTAL CA SCREEN DOC REV: CPT | Performed by: ORTHOPAEDIC SURGERY

## 2021-12-21 PROCEDURE — G8484 FLU IMMUNIZE NO ADMIN: HCPCS | Performed by: ORTHOPAEDIC SURGERY

## 2021-12-21 PROCEDURE — G8417 CALC BMI ABV UP PARAM F/U: HCPCS | Performed by: ORTHOPAEDIC SURGERY

## 2021-12-21 PROCEDURE — 1036F TOBACCO NON-USER: CPT | Performed by: ORTHOPAEDIC SURGERY

## 2021-12-21 RX ORDER — IBUPROFEN 800 MG/1
800 TABLET ORAL 2 TIMES DAILY WITH MEALS
Qty: 60 TABLET | Refills: 1 | Status: SHIPPED | OUTPATIENT
Start: 2021-12-21 | End: 2022-05-03 | Stop reason: SDUPTHER

## 2021-12-21 NOTE — PROGRESS NOTES
Cadence Ardon  <[id number]>  December 21, 2021    Chief Complaint   Patient presents with    Follow-up     Bilateral knee       History: The patient is a 24-year-old female who is here for evaluation of both knees. The patient received her last Visco supplement injections 5 weeks ago. She does report moderate improvement with regards to her symptoms. Her right knee does bother her more than the left. She continues to have difficulty getting up from a seated position. She does have difficulty with stairs. The patient's  past medical history, medications, allergies,  family history, social history, and have been reviewed, and dated and are recorded in the chart. Pertinent items are noted in HPI. Review of systems reviewed from Pertinent History Form dated on 6/23/2021 and available in the patient's chart under the Media tab. Vitals:  Ht 5' 4\" (1.626 m)   Wt 235 lb (106.6 kg)   LMP 06/10/2020   BMI 40.34 kg/m²     Physical: Ms. Cadence Ardon appears well, she is in no apparent distress, she demonstrates appropriate mood & affect. She is alert and oriented to person, place and time. Examination of the bilateral lower extremity reveals no pain with range of motion of the hip. She has generalized tenderness to palpation about the joint line of the bilateral knee. Range of motion is from 0 degrees to 120 degrees bilaterally. Strength is 4+ to 5/5 for all muscle groups about the bilateral knee. There is patellofemoral crepitus with range of motion of the bilateral knee. Varus and valgus stressing of the knees reveals no evidence of instability. There is no clear evidence of  effusion in the knees. Anterior drawer and Lachman are negative bilaterally. Examination of the skin reveals no rashes, ulceration, or lesion, bilaterally in the lower extremities. Sensation to both lower extremities is grossly intact. Exam of both feet reveals pedal pulses intact and brisk cap refill.  Patient is able to dorsiflex and wiggle all toes. Deep tendon reflexes of the lower extremities are normal and symmetric. X-rays: We again reviewed her knee x-rays from the past and she has bilateral knee osteoarthritis. The changes are most severe medially. Impression: Bilateral knee osteoarthritis #2 lumbar spondylosis #3 morbid obesity #4 lumbar degenerative disc disease    Plan: At this time, we will continue our current treatment. She will continue to work on weight loss. She will continue to modify her activities. The patient will continue to work on range of motion and strengthening. The patient was given a prescription for ibuprofen 800 mg twice a day with food. The patient will monitor her symptoms closely. If the patient remains symptomatic, we will consider injections. Patient was instructed to follow-up in 3 weeks.

## 2022-01-06 ENCOUNTER — TELEMEDICINE (OUTPATIENT)
Dept: BARIATRICS/WEIGHT MGMT | Age: 52
End: 2022-01-06
Payer: COMMERCIAL

## 2022-01-06 DIAGNOSIS — Z71.3 DIETARY COUNSELING AND SURVEILLANCE: ICD-10-CM

## 2022-01-06 DIAGNOSIS — E66.01 MORBID OBESITY WITH BMI OF 40.0-44.9, ADULT (HCC): Primary | ICD-10-CM

## 2022-01-06 PROCEDURE — 99213 OFFICE O/P EST LOW 20 MIN: CPT | Performed by: FAMILY MEDICINE

## 2022-01-06 PROCEDURE — G8427 DOCREV CUR MEDS BY ELIG CLIN: HCPCS | Performed by: FAMILY MEDICINE

## 2022-01-06 PROCEDURE — 3017F COLORECTAL CA SCREEN DOC REV: CPT | Performed by: FAMILY MEDICINE

## 2022-01-06 ASSESSMENT — ENCOUNTER SYMPTOMS
PHOTOPHOBIA: 0
ABDOMINAL PAIN: 0
CHOKING: 0
VOMITING: 0
DIARRHEA: 0
SHORTNESS OF BREATH: 0
CONSTIPATION: 0
EYE PAIN: 0
CHEST TIGHTNESS: 0
COUGH: 0
NAUSEA: 0
ABDOMINAL DISTENTION: 0
APNEA: 0
WHEEZING: 0
BLOOD IN STOOL: 0

## 2022-01-06 NOTE — PROGRESS NOTES
Patient: Valeri Ortiz                      Encounter Date: 1/6/2022    YOB: 1970                Age: 46 y.o. Chief Complaint   Patient presents with    Weight Management     F/u MWM         Patient identification was verified at the start of the visit. No flowsheet data found. BP Readings from Last 1 Encounters:   10/02/21 (!) 166/107       BMI Readings from Last 1 Encounters:   12/21/21 40.34 kg/m²       Pulse Readings from Last 1 Encounters:   10/02/21 99           Wt Readings from Last 3 Encounters:   12/21/21 235 lb (106.6 kg)   11/09/21 240 lb (108.9 kg)   11/02/21 240 lb (108.9 kg)       HPI: 46 y.o. female with a long-standing history of obesity presents today for virtual video follow-up. she has lost 5 pounds since her last visit. Current treatment includes low carb/meli diet. Tolerating it well. Food recall reviewed with the patient. Making better dietary choices. Motivated to continue losing weight. Interested in learning more about bariatric surgery.      No side effects from metformin     No Known Allergies      Current Outpatient Medications:     ibuprofen (ADVIL;MOTRIN) 800 MG tablet, Take 1 tablet by mouth 2 times daily (with meals), Disp: 60 tablet, Rfl: 1    cyclobenzaprine (FLEXERIL) 10 MG tablet, Take 1 tablet by mouth 2 times daily as needed for Muscle spasms (Patient not taking: Reported on 12/21/2021), Disp: 14 tablet, Rfl: 0    metFORMIN (GLUCOPHAGE-XR) 500 MG extended release tablet, Take 1 tablet by mouth daily (with breakfast), Disp: 30 tablet, Rfl: 5    PARoxetine (PAXIL CR) 12.5 MG extended release tablet, Take 12.5 mg by mouth daily , Disp: , Rfl:     FLUoxetine (PROZAC) 10 MG capsule, TAKE 1 CAPSULE BY MOUTH ONCE DAILY, Disp: , Rfl:     NIFEdipine (ADALAT CC) 30 MG extended release tablet, Take 30 mg by mouth daily , Disp: , Rfl:     Magic Mouthwash (MIRACLE MOUTHWASH), Swish and spit 10 mLs 4 times daily as needed for Irritation or Pain Dispense as Magic Mouthwash. Please add Equal Parts: 60 mL Maalox, 60 mL Viscous Lidocaine, 60 mL Benadryl to 60mL of Carafate. 10 ml swish and spit or swallow as directed above., Disp: 240 mL, Rfl: 0    albuterol sulfate HFA (VENTOLIN HFA) 108 (90 Base) MCG/ACT inhaler, Inhale 2 puffs into the lungs 4 times daily as needed for Wheezing or Shortness of Breath, Disp: 18 g, Rfl: 0    meloxicam (MOBIC) 15 MG tablet, Take 1 tablet by mouth daily as needed for Pain (Patient not taking: Reported on 12/21/2021), Disp: 30 tablet, Rfl: 0    Patient Active Problem List   Diagnosis    Lumbar spondylosis    Lumbar pain with radiation down both legs    Obesity (BMI 30-39. 9)       Review of Systems   Constitutional: Negative for fatigue. Eyes: Negative for photophobia, pain and visual disturbance. Respiratory: Negative for apnea, cough, choking, chest tightness, shortness of breath and wheezing. Cardiovascular: Negative for chest pain, palpitations and leg swelling. Gastrointestinal: Negative for abdominal distention, abdominal pain, blood in stool, constipation, diarrhea, nausea and vomiting. Endocrine: Negative for cold intolerance and heat intolerance. Musculoskeletal: Negative for arthralgias and myalgias. Skin: Negative for rash. Neurological: Negative for dizziness, tremors, syncope, weakness, numbness and headaches. Psychiatric/Behavioral: Negative for agitation, confusion, decreased concentration, dysphoric mood, hallucinations, sleep disturbance and suicidal ideas. The patient is not nervous/anxious and is not hyperactive. Physical Exam  Constitutional:       Appearance: She is well-developed. HENT:      Head: Normocephalic. Eyes:      Conjunctiva/sclera: Conjunctivae normal.   Abdominal:      General: Abdomen is protuberant. Musculoskeletal:         General: No swelling. Neurological:      Mental Status: She is alert and oriented to person, place, and time.    Psychiatric:         Mood and Affect: Mood normal.         Behavior: Behavior normal.         Thought Content: Thought content normal.         Judgment: Judgment normal.         Orders Only on 10/25/2021   Component Date Value Ref Range Status    Vit D, 25-Hydroxy 10/25/2021 24.1* >=30 ng/mL Final    Comment: <=20 ng/mL. ........... Thurl Mutton Deficient  21-29 ng/mL. ......... Thurl Mutton Insufficient  >=30 ng/mL. ........ Thurl Mutton Sufficient      Vitamin B-12 10/25/2021 1090* 211 - 911 pg/mL Final    Folate 10/25/2021 19.84  4.78 - 24.20 ng/mL Final    Comment: Effective 11-15-16 10:00am EST  Please note reference ranges have  changed for Folate.  TSH 10/25/2021 3.18  0.27 - 4.20 uIU/mL Final    Hemoglobin A1C 10/25/2021 6.4  See comment % Final    Comment: Comment:  Diagnosis of Diabetes: > or = 6.5%  Increased risk of diabetes (Prediabetes): 5.7-6.4%  Glycemic Control: Nonpregnant Adults: <7.0%                    Pregnant: <6.0%        eAG 10/25/2021 137.0  mg/dL Final    Cholesterol, Total 10/25/2021 206* 0 - 199 mg/dL Final    Triglycerides 10/25/2021 183* 0 - 150 mg/dL Final    HDL 10/25/2021 75* 40 - 60 mg/dL Final    LDL Calculated 10/25/2021 94  <100 mg/dL Final    VLDL Cholesterol Calculated 10/25/2021 37  Not Established mg/dL Final         Assessment and Plan:  1. Morbid obesity with BMI of 40.0-44.9, adult ([address]  Improving. Continue current management. Refer to Dr. Johnson rOtiz to review surgical options. - Lane Johnson MD, Bariatric Surgery, [facility]    2. Dietary counseling and surveillance  Low carb/meli meal plan.           Nutrition Plan: [] LCHF/Ketogenic   [x] Modified low-calorie diet (low carb/low-meli)               [] Low-calorie diet    [] Maintenance       []Other        Exercise: [x] Cardio     [] Resistance/strength training                       [x] ACSM recommendations (150 minutes/week in active weight loss)               Behavior: [x] Motivational interviewing performed    [] Referralfor counseling [x] Discussed strategies to overcome habits/challenges for focus         [] Stress management   [x] Stimulus control         [] Sleep hygiene      Reviewed:  [x] Nutrition and the importance of regular protein intake  [x] Hidden carbohydrate sources  [x] Alcohol use  [x] Tobacco use   [x] Drug use- Denies  [x] Importance of exercise and reducing sedentary time      Controlled Substance Monitoring:    RX Monitoring 8/30/2021   Periodic Controlled Substance Monitoring Possible medication side effects, risk of tolerance/dependence & alternative treatments discussed. ;No signs of potential drug abuse or diversion identified. Key dietary points:    - Meats (preferably organic or grass fed) are great sources of protein and have no carbohydrates. - Recommend coconut, olive, avocado, or almond oils. - When buying dairy, choose regular or full fat options. - Choose vegetables that grow above ground as they are generally lower in carbohydrates and higher in fiber.  - Avoid starches such as bread, rice, potatoes, pasta and all sources of simple sugars (desserts, soda, breakfast cereals). - Choose beverages that are calorie and sugar free. Orders Placed This Encounter   Procedures   Alexis James MD, Bariatric Surgery, [facility]     Referral Priority:   Routine     Referral Type:   Eval and Treat     Referral Reason:   Specialty Services Required     Referred to Provider:   Jairon Sal MD     Requested Specialty:   Bariatrics     Number of Visits Requested:   1       No follow-ups on file. Vika Silvestre is a 46 y.o. female being evaluated by a Virtual Visit (video visit) encounter to address concerns as mentioned above. A caregiver was present when appropriate. Due to this being a TeleHealth encounter (During QAAKF-31 public health emergency), evaluation of the following organ systems was limited: Vitals/Constitutional/EENT/Resp/CV/GI//MS/Neuro/Skin/Heme-Lymph-Imm.   Pursuant to the emergency declaration under the [facility] waiver authority and the [employer] Act, this Virtual Visit was conducted with patient's (and/or legal guardian's) consent, to reduce the patient's risk of exposure to COVID-19 and provide necessary medical care. The patient (and/or legal guardian) has also been advised to contact this office for worsening conditions or problems, and seek emergency medical treatment and/or call 911 if deemed necessary.

## 2022-01-11 ENCOUNTER — OFFICE VISIT (OUTPATIENT)
Dept: ORTHOPEDIC SURGERY | Age: 52
End: 2022-01-11
Payer: COMMERCIAL

## 2022-01-11 ENCOUNTER — TELEPHONE (OUTPATIENT)
Dept: BARIATRICS/WEIGHT MGMT | Age: 52
End: 2022-01-11

## 2022-01-11 VITALS — WEIGHT: 237.8 LBS | HEIGHT: 64 IN | BODY MASS INDEX: 40.6 KG/M2

## 2022-01-11 DIAGNOSIS — M17.11 PRIMARY OSTEOARTHRITIS OF RIGHT KNEE: ICD-10-CM

## 2022-01-11 DIAGNOSIS — M17.12 PRIMARY OSTEOARTHRITIS OF LEFT KNEE: Primary | ICD-10-CM

## 2022-01-11 PROCEDURE — 99213 OFFICE O/P EST LOW 20 MIN: CPT | Performed by: ORTHOPAEDIC SURGERY

## 2022-01-11 PROCEDURE — 3017F COLORECTAL CA SCREEN DOC REV: CPT | Performed by: ORTHOPAEDIC SURGERY

## 2022-01-11 PROCEDURE — 1036F TOBACCO NON-USER: CPT | Performed by: ORTHOPAEDIC SURGERY

## 2022-01-11 PROCEDURE — G8417 CALC BMI ABV UP PARAM F/U: HCPCS | Performed by: ORTHOPAEDIC SURGERY

## 2022-01-11 PROCEDURE — 20610 DRAIN/INJ JOINT/BURSA W/O US: CPT | Performed by: ORTHOPAEDIC SURGERY

## 2022-01-11 PROCEDURE — G8427 DOCREV CUR MEDS BY ELIG CLIN: HCPCS | Performed by: ORTHOPAEDIC SURGERY

## 2022-01-11 PROCEDURE — G8484 FLU IMMUNIZE NO ADMIN: HCPCS | Performed by: ORTHOPAEDIC SURGERY

## 2022-01-11 RX ORDER — TRIAMCINOLONE ACETONIDE 40 MG/ML
80 INJECTION, SUSPENSION INTRA-ARTICULAR; INTRAMUSCULAR ONCE
Status: COMPLETED | OUTPATIENT
Start: 2022-01-11 | End: 2022-01-11

## 2022-01-11 RX ORDER — BUPIVACAINE HYDROCHLORIDE 2.5 MG/ML
3 INJECTION, SOLUTION INFILTRATION; PERINEURAL ONCE
Status: COMPLETED | OUTPATIENT
Start: 2022-01-11 | End: 2022-01-11

## 2022-01-11 RX ORDER — CYCLOBENZAPRINE HCL 10 MG
10 TABLET ORAL 2 TIMES DAILY PRN
Qty: 60 TABLET | Refills: 0 | Status: SHIPPED | OUTPATIENT
Start: 2022-01-11 | End: 2022-08-05 | Stop reason: SDUPTHER

## 2022-01-11 RX ADMIN — TRIAMCINOLONE ACETONIDE 80 MG: 40 INJECTION, SUSPENSION INTRA-ARTICULAR; INTRAMUSCULAR at 11:36

## 2022-01-11 RX ADMIN — BUPIVACAINE HYDROCHLORIDE 7.5 MG: 2.5 INJECTION, SOLUTION INFILTRATION; PERINEURAL at 11:35

## 2022-01-11 RX ADMIN — TRIAMCINOLONE ACETONIDE 80 MG: 40 INJECTION, SUSPENSION INTRA-ARTICULAR; INTRAMUSCULAR at 11:35

## 2022-01-11 NOTE — PROGRESS NOTES
Rod Manzo  <[id number]>  January 11, 2022    Chief Complaint   Patient presents with    Follow-up     bilateral knee       History: The patient is a 49-year-old female who is here for evaluation of both knees. The patient received her last Visco supplement injections 2 months ago. She continues to have moderate bilateral knee pain. Her right knee does bother her more than the left. She does take ibuprofen on a regular basis. She has difficulty getting up from a seated position. The patient's  past medical history, medications, allergies,  family history, social history, and have been reviewed, and dated and are recorded in the chart. Pertinent items are noted in HPI. Review of systems reviewed from Pertinent History Form dated on 6/23/2021 and available in the patient's chart under the Media tab. Vitals:  Ht 5' 4\" (1.626 m)   Wt 237 lb 12.8 oz (107.9 kg)   LMP 06/10/2020   BMI 40.82 kg/m²     Physical: Ms. Rod Manzo appears well, she is in no apparent distress, she demonstrates appropriate mood & affect. She is alert and oriented to person, place and time. Examination of the bilateral lower extremity reveals no pain with range of motion of the hip. She has generalized tenderness to palpation about the joint line of the bilateral knee. Range of motion is from 0 degrees to 115 degrees bilaterally. Strength is 4+ to 5/5 for all muscle groups about the bilateral knee. There is patellofemoral crepitus with range of motion of the bilateral knee. Varus and valgus stressing of the knees reveals no evidence of instability. There is no clear evidence of  effusion in the knees. Anterior drawer and Lachman are negative bilaterally. Examination of the skin reveals no rashes, ulceration, or lesion, bilaterally in the lower extremities. Sensation to both lower extremities is grossly intact. Exam of both feet reveals pedal pulses intact and brisk cap refill.  Patient is able to dorsiflex and wiggle all toes. Deep tendon reflexes of the lower extremities are normal and symmetric. X-rays: We again reviewed her knee x-rays from the past and she has bilateral knee osteoarthritis. The changes are most severe medially. Impression: Bilateral knee osteoarthritis #2 lumbar spondylosis #3 morbid obesity #4 lumbar degenerative disc disease    Plan: At this time, the bilateral knees were injected under sterile conditions. After a Betadine prep of the joints, 3 cc of 0.25% Marcaine and 2 cc of 40 mg Kenalog were injected into the knees. The patient tolerated the injections rather well. The patient was instructed to follow up for any signs of infection. Natural history and expected course discussed. Questions answered. Reduction in offending activity. OTC analgesics as needed. The patient will follow up with me in 3 months. The patient will continue to work on weight loss. She is strongly considering bariatric surgery.

## 2022-03-01 ENCOUNTER — OFFICE VISIT (OUTPATIENT)
Dept: BARIATRICS/WEIGHT MGMT | Age: 52
End: 2022-03-01
Payer: COMMERCIAL

## 2022-03-01 VITALS
BODY MASS INDEX: 40.22 KG/M2 | HEART RATE: 115 BPM | SYSTOLIC BLOOD PRESSURE: 145 MMHG | WEIGHT: 235.6 LBS | HEIGHT: 64 IN | RESPIRATION RATE: 18 BRPM | DIASTOLIC BLOOD PRESSURE: 85 MMHG | OXYGEN SATURATION: 99 %

## 2022-03-01 DIAGNOSIS — K43.9 VENTRAL HERNIA WITHOUT OBSTRUCTION OR GANGRENE: ICD-10-CM

## 2022-03-01 DIAGNOSIS — M54.50 LUMBAR PAIN WITH RADIATION DOWN BOTH LEGS: ICD-10-CM

## 2022-03-01 DIAGNOSIS — Z01.818 PREOPERATIVE CLEARANCE: ICD-10-CM

## 2022-03-01 DIAGNOSIS — K21.9 CHRONIC GERD: ICD-10-CM

## 2022-03-01 DIAGNOSIS — M79.604 LUMBAR PAIN WITH RADIATION DOWN BOTH LEGS: ICD-10-CM

## 2022-03-01 DIAGNOSIS — M79.605 LUMBAR PAIN WITH RADIATION DOWN BOTH LEGS: ICD-10-CM

## 2022-03-01 DIAGNOSIS — I10 ESSENTIAL HYPERTENSION: ICD-10-CM

## 2022-03-01 DIAGNOSIS — R73.03 PREDIABETES: ICD-10-CM

## 2022-03-01 DIAGNOSIS — E66.01 MORBID OBESITY WITH BMI OF 40.0-44.9, ADULT (HCC): Primary | ICD-10-CM

## 2022-03-01 PROCEDURE — G8484 FLU IMMUNIZE NO ADMIN: HCPCS | Performed by: SURGERY

## 2022-03-01 PROCEDURE — 1036F TOBACCO NON-USER: CPT | Performed by: SURGERY

## 2022-03-01 PROCEDURE — 99205 OFFICE O/P NEW HI 60 MIN: CPT | Performed by: SURGERY

## 2022-03-01 PROCEDURE — 3017F COLORECTAL CA SCREEN DOC REV: CPT | Performed by: SURGERY

## 2022-03-01 PROCEDURE — G8417 CALC BMI ABV UP PARAM F/U: HCPCS | Performed by: SURGERY

## 2022-03-01 PROCEDURE — G8427 DOCREV CUR MEDS BY ELIG CLIN: HCPCS | Performed by: SURGERY

## 2022-03-01 NOTE — PROGRESS NOTES
Basia Vieira is a 45 y.o. female with a date of birth of 1970. Vitals:    03/01/22 1155   Resp: 18    BMI: Body mass index is 40.44 kg/m². Obesity Classification: Class III    Weight History: Wt Readings from Last 3 Encounters:   03/01/22 235 lb 9.6 oz (106.9 kg)   01/11/22 237 lb 12.8 oz (107.9 kg)   12/21/21 235 lb (106.6 kg)     Patient's lowest adult weight was 174-175 lb at age 53.       Patient's highest adult weight was 240 lb at age 46. Patient has participated in the following weight loss programs: 1500 kcal LC with Dr. Jason Gonzalez in 2021/2022. Patient has not participated in meal replacement/liquid diets. Patient has not participated in weight loss medications    Patient is not lactose intolerant. Patient does have Jehovah's witness/cultural food concerns - NO PORK. Patient does not have food allergies. Patient does tolerate artificial sweeteners. 24 hour recall/food frequency chart: Pt speaks [place]. Breakfast: bread OR rice with sour cream and sugar   Snack: peanuts OR orange/apple   Lunch: skips   Snack: peanuts OR orange/apple   Dinner: lamb/beef/rice/fish with rice with fruit with salad/onions/cucumber  Snack: peanuts OR orange/apple   Drinks throughout the day: tea with regular sugar, water, no juice/soda  Do you drink alcohol? No.     Patient does meet the criteria for binge eating disorder. Patient does have h/o grazing. Patient does not have night eating. Patient does have a history of emotional eating or eating out of boredom. Pt tries to exercise 3 x week 2-3 miles. Surgery  Patient does feel confident in her ability to make these changes. The patient's expectations of post-surgical eating habits realistic. Patient states she does understand the consequences of not complying with post-op food guidelines. Patient states she does understands the long term changes in food intake that will be necessary for all occasions after surgery for the rest of her life.       Patient is deemed nutritionally appropriate to proceed. Goals  Weight: 170-175 lbs   Health Improvement: lose weight, improve pain and arthritis     Assessment  Nutritional Needs: RMR=(9.99 x 106.9) + (6.25 x 162.6) - (4.92 x 52 y.o.) -161= 1657 kcal x 1.4 (sedentary activity factor)= 2319 kcal - 1000 (for 2 lb weight loss/week)= 1319 kcal.    Plan  Plan/Recommendations: Start presurgical guidelines. Goals:   -Eat 4-5 times daily  -Avoid high fat and high sugar foods  -Include protein with all meals and snacks  -Avoid carbonation and caffeine  -Avoid calorie containing beverages  -Increase physical activity as tolerated    PES Statement:  Overweight/Obesity related to lack of exercise, sedentary lifestyle, unhealthy eating habits, and unsuccessful diet attempts as evidenced by BMI. Body mass index is 40.44 kg/m². Will follow up as necessary.     Austin Smith RD, LD

## 2022-03-01 NOTE — Clinical Note
Greatly appreciate the referral.  Excellent candidate for weight loss. We will keep you posted on [facility] progress.

## 2022-03-01 NOTE — PROGRESS NOTES
[facility]) Physicians   [employer] [facility] [employer]  Mindy Weston MD, [address] [facility], [address] [place] [address]    [place]  [address] . Phone: [phone]  Fax: [phone]       Chief Complaint   Patient presents with    Bariatric, Initial Visit     NP KRISTI Li           HPI:    Yolie Grimes is a very pleasant 45 y.o. obese female ,   Body mass index is 40.44 kg/m². And multiple medical problems who is presenting for weight loss surgery evaluation and consultation by Dr. Holli Ma. Patient has been struggling for several years now with obesity. Patient feels the weight is an obstacle to achieve and perform things in daily living as well risk on health. Tries to diet, and exercise but can't keep the weight off. Patient tried 1500 kcal LC with Dr. Raegan Yeung in 2021/2022. Patient has not participated in meal replacement/liquid diets. Patient has not participated in weight loss medications and other regimens, but with no sustainable weight loss. Patient  is very determined to lose weight and be healthy, and is interested in surgical weight loss for future weight loss. .    Otherwise patient denies any nausea, vomiting, fevers, chills, shortness of breath, chest pain, constipation or urinary symptoms. Obesity related problems [place] is dealing with:  Patient Active Problem List   Diagnosis    Lumbar spondylosis    Lumbar pain with radiation down both legs    Obesity (BMI 30-39. 9)    Preoperative clearance    Morbid obesity with BMI of 40.0-44.9, adult (HCC)    Chronic GERD    Essential hypertension    Prediabetes    Ventral hernia without obstruction or gangrene           Pain Assessment   Denies any abdominal pain     Past Medical History:   Diagnosis Date    Arthritis     Asthma     Hypertension     Obesity (BMI 30-39. 9)      History reviewed. No pertinent surgical history. History reviewed. No pertinent family history.   Social History Tobacco Use    Smoking status: Never Smoker    Smokeless tobacco: Never Used   Substance Use Topics    Alcohol use: No         I counseled the patient on the risks of Smoking, ETOH or Drug use, and importance of completely avoiding them, otherwise patient risks surgery cancellation or post operative serious complications if they start using any. Martin Pratt acknowledged, agreed not to use and wants to proceed. No Known Allergies  Vitals:    03/01/22 1155   BP: (!) 145/85   Pulse: 115   Resp: 18   SpO2: 99%   Weight: 235 lb 9.6 oz (106.9 kg)   Height: 5' 4\" (1.626 m)       Body mass index is 40.44 kg/m². Current Outpatient Medications:     ibuprofen (ADVIL;MOTRIN) 800 MG tablet, Take 1 tablet by mouth 2 times daily (with meals), Disp: 60 tablet, Rfl: 1    metFORMIN (GLUCOPHAGE-XR) 500 MG extended release tablet, Take 1 tablet by mouth daily (with breakfast), Disp: 30 tablet, Rfl: 5    PARoxetine (PAXIL CR) 12.5 MG extended release tablet, Take 12.5 mg by mouth daily , Disp: , Rfl:     FLUoxetine (PROZAC) 10 MG capsule, TAKE 1 CAPSULE BY MOUTH ONCE DAILY, Disp: , Rfl:     NIFEdipine (ADALAT CC) 30 MG extended release tablet, Take 30 mg by mouth daily , Disp: , Rfl:     Magic Mouthwash (MIRACLE MOUTHWASH), Swish and spit 10 mLs 4 times daily as needed for Irritation or Pain Dispense as Magic Mouthwash. Please add Equal Parts: 60 mL Maalox, 60 mL Viscous Lidocaine, 60 mL Benadryl to 60mL of Carafate.   10 ml swish and spit or swallow as directed above., Disp: 240 mL, Rfl: 0    albuterol sulfate HFA (VENTOLIN HFA) 108 (90 Base) MCG/ACT inhaler, Inhale 2 puffs into the lungs 4 times daily as needed for Wheezing or Shortness of Breath, Disp: 18 g, Rfl: 0    meloxicam (MOBIC) 15 MG tablet, Take 1 tablet by mouth daily as needed for Pain (Patient not taking: Reported on 12/21/2021), Disp: 30 tablet, Rfl: 0      Review of Systems - History obtained from the patient  General ROS: overweight mass. There is no hepatosplenomegaly. There is no tenderness. There is no rigidity, no rebound, no guarding and no CVA tenderness. No hernia. Hernia confirmed negative in the ventral area. Musculoskeletal: Normal range of motion. Patient exhibits no edema or tenderness. Lymphadenopathy:        Head (right side): No submental, no submandibular, no preauricular, no posterior auricular and no occipital adenopathy present. Head (left side): No submental, no submandibular, no preauricular, no posterior auricular and no occipital adenopathy present. Patient  has no cervical adenopathy. Right: No supraclavicular adenopathy present. Left: No supraclavicular adenopathy present. Neurological: Patient is alert and oriented to person, place, and time. Patient has normal strength. Coordination and gait normal. GCS eye subscore is 4. GCS verbal subscore is 5. GCS motor subscore is 6. Skin: Skin is warm and dry. No abrasion and no rash noted. Patient  is not diaphoretic. No cyanosis or erythema. Psychiatric: Patient has a normal mood and affect. speech is normal and behavior is normal. Cognition and memory are normal.         Carmen Sorenson was seen today for bariatric, initial visit. Diagnoses and all orders for this visit:    Morbid obesity with BMI of 40.0-44.9, adult ([facility]  -     CBC with Auto Differential; Future  -     Comprehensive Metabolic Panel; Future  -     Hemoglobin A1C; Future  -     Iron and TIBC; Future  -     Lipid Panel; Future  -     TSH with Reflex; Future  -     Vitamin A; Future  -     Vitamin B1, Whole Blood; Future  -     Vitamin B12 & Folate; Future  -     Vitamin D 25 Hydroxy; Future  -     Vitamin E; Future  -     Protime-INR; Future  -     Ambulatory referral to Cardiology    Preoperative clearance  -     CBC with Auto Differential; Future  -     Comprehensive Metabolic Panel; Future  -     Hemoglobin A1C; Future  -     Iron and TIBC;  Future  -     Lipid Panel; Future  -     TSH with Reflex; Future  -     Vitamin A; Future  -     Vitamin B1, Whole Blood; Future  -     Vitamin B12 & Folate; Future  -     Vitamin D 25 Hydroxy; Future  -     Vitamin E; Future  -     Protime-INR; Future  -     Ambulatory referral to Cardiology    Lumbar pain with radiation down both legs  -     CBC with Auto Differential; Future  -     Comprehensive Metabolic Panel; Future  -     Hemoglobin A1C; Future  -     Iron and TIBC; Future  -     Lipid Panel; Future  -     TSH with Reflex; Future  -     Vitamin A; Future  -     Vitamin B1, Whole Blood; Future  -     Vitamin B12 & Folate; Future  -     Vitamin D 25 Hydroxy; Future  -     Vitamin E; Future  -     Protime-INR; Future  -     Ambulatory referral to Cardiology    Chronic GERD  -     CBC with Auto Differential; Future  -     Comprehensive Metabolic Panel; Future  -     Hemoglobin A1C; Future  -     Iron and TIBC; Future  -     Lipid Panel; Future  -     TSH with Reflex; Future  -     Vitamin A; Future  -     Vitamin B1, Whole Blood; Future  -     Vitamin B12 & Folate; Future  -     Vitamin D 25 Hydroxy; Future  -     Vitamin E; Future  -     Protime-INR; Future  -     Ambulatory referral to Cardiology    Essential hypertension  -     CBC with Auto Differential; Future  -     Comprehensive Metabolic Panel; Future  -     Hemoglobin A1C; Future  -     Iron and TIBC; Future  -     Lipid Panel; Future  -     TSH with Reflex; Future  -     Vitamin A; Future  -     Vitamin B1, Whole Blood; Future  -     Vitamin B12 & Folate; Future  -     Vitamin D 25 Hydroxy; Future  -     Vitamin E; Future  -     Protime-INR; Future  -     Ambulatory referral to Cardiology    Prediabetes  -     CBC with Auto Differential; Future  -     Comprehensive Metabolic Panel; Future  -     Hemoglobin A1C; Future  -     Iron and TIBC; Future  -     Lipid Panel; Future  -     TSH with Reflex; Future  -     Vitamin A; Future  -     Vitamin B1, Whole Blood;  Future  - Vitamin B12 & Folate; Future  -     Vitamin D 25 Hydroxy; Future  -     Vitamin E; Future  -     Protime-INR; Future  -     Ambulatory referral to Cardiology    Ventral hernia without obstruction or gangrene  -     CBC with Auto Differential; Future  -     Comprehensive Metabolic Panel; Future  -     Hemoglobin A1C; Future  -     Iron and TIBC; Future  -     Lipid Panel; Future  -     TSH with Reflex; Future  -     Vitamin A; Future  -     Vitamin B1, Whole Blood; Future  -     Vitamin B12 & Folate; Future  -     Vitamin D 25 Hydroxy; Future  -     Vitamin E; Future  -     Protime-INR; Future  -     Ambulatory referral to Cardiology          A/P  Yolie Grimes is a very pleasant 45 y.o. female with Obesity,  Body mass index is 40.44 kg/m². and multiple obesity related co-morbidities. Yolie Grimes is very motivated to lose weight and being more healthy. We discussed how her weight affects her overall health including:  Patient Active Problem List   Diagnosis    Lumbar spondylosis    Lumbar pain with radiation down both legs    Obesity (BMI 30-39. 9)    Preoperative clearance    Morbid obesity with BMI of 40.0-44.9, adult (HCC)    Chronic GERD    Essential hypertension    Prediabetes    Ventral hernia without obstruction or gangrene          The patient underwent extensive dietary counseling with the registered dietitian. I have reviewed, discussed and agree with the dietary plan. Medical weight loss and different surgical options were discussed in details with patient. [place] is interested in surgical weight loss for future weight loss. Patient is interested in Laparoscopic Sleeve Gastrectomy, which I believe is an excellent option. I explained to the patient that surgery does carry a risk specially with the coexisting comorbid conditions the patient have. Surgery as well in obese patiens can carry more risk. Risks including but not limited to;  Infection, bleeding, gastric leak or obstruction, persistent nausea, vomiting, or reflux, injury to surrounding structures, risks of anesthesia, stricture, delayed gastric emptying, staple line leak, incisional hernia, malnutrition , hair loss, and/ or Conversion to Open surgery may be necessary. Failure to lose or maintain weight loss, Gallstones or Kidney Stones, Deep Venous Thrombosis , pulmonary embolism and / or death. However I do believe the benefits outweighs that risk. Jeferson Saez understands the risks and wants to proceed. We will proceed with pre-operative work up labs and studies. Will also petition patient's  insurance for approval for this procedure. I advised the patient that we can't guarantee final insurance approval.    Patient received dietary handouts and education. Patient advised that its their responsibility to follow up for studies, referrals and/or labs ordered today. Also discussed in details the importance of follow up, as well following the recommendations and completing the whole program to improve outcomes when it comes to healthier lifestyle as well weight loss. Patient also advised about risks and benefits being on a strict dietary regimen as well using supplements. Patient agrees and wants to proceed with weight loss planning     Today's encounter included any number of the following: Bariatric Pre/Post operative work up/protocols, review of labs, imaging, provider notes, outside hospital records, performing examination/evaluation, counseling patient and/or family, ordering medications/tests, placing referrals and communication with referring physicians, coordination of care; discussing dietary plan/recall with the patient as well with registered dietitian and documentation in the EHR. Of note, the above was done during same day of the actual patient encounter. Obesity as a disease is considered a high risk to patients overall health and should therefore be considered a high risk disease state. recognition software. Please notify me in case of any questions about the content of this document, as some errors in transcription may have occurred .

## 2022-03-01 NOTE — PATIENT INSTRUCTIONS
Patient received dietary handouts and education.        -Plan for Laparoscopic sleeve gastrectomy      Pre-operative work up Ordered:    - F/U in 4 weeks. - Nutrition Labs. - Protein Shake Trial.  - Psych Evaluation.   - Cardiac Clearance. - EGD (endoscopy to check your stomach). - Support Group Attendance. - Obtain letter of medical necessity (PCP Letter). - Quit Smoking,  Alcohol, Caffeine and Carbonated Drinks  - Obtain records for Weight History 2 yrs. - Start Regular Exercise and track your activities. - Start Tracking your food Intake and follow dietary guidelines. - Avoid Pregnancy for 2 yrs from date of surgery. (for female patients in childbearing age)        Patient advised that its their responsibility to follow up for studies, referrals and/or labs ordered today.

## 2022-03-08 DIAGNOSIS — M79.604 LUMBAR PAIN WITH RADIATION DOWN BOTH LEGS: ICD-10-CM

## 2022-03-08 DIAGNOSIS — K43.9 VENTRAL HERNIA WITHOUT OBSTRUCTION OR GANGRENE: ICD-10-CM

## 2022-03-08 DIAGNOSIS — M54.50 LUMBAR PAIN WITH RADIATION DOWN BOTH LEGS: ICD-10-CM

## 2022-03-08 DIAGNOSIS — E66.01 MORBID OBESITY WITH BMI OF 40.0-44.9, ADULT (HCC): ICD-10-CM

## 2022-03-08 DIAGNOSIS — K21.9 CHRONIC GERD: ICD-10-CM

## 2022-03-08 DIAGNOSIS — I10 ESSENTIAL HYPERTENSION: ICD-10-CM

## 2022-03-08 DIAGNOSIS — Z01.818 PREOPERATIVE CLEARANCE: ICD-10-CM

## 2022-03-08 DIAGNOSIS — M79.605 LUMBAR PAIN WITH RADIATION DOWN BOTH LEGS: ICD-10-CM

## 2022-03-08 DIAGNOSIS — R73.03 PREDIABETES: ICD-10-CM

## 2022-03-08 LAB
A/G RATIO: 1.3 (ref 1.1–2.2)
ALBUMIN SERPL-MCNC: 4.2 G/DL (ref 3.4–5)
ALP BLD-CCNC: 78 U/L (ref 40–129)
ALT SERPL-CCNC: 20 U/L (ref 10–40)
ANION GAP SERPL CALCULATED.3IONS-SCNC: 13 MMOL/L (ref 3–16)
AST SERPL-CCNC: 16 U/L (ref 15–37)
BILIRUB SERPL-MCNC: 0.3 MG/DL (ref 0–1)
BUN BLDV-MCNC: 12 MG/DL (ref 7–20)
CALCIUM SERPL-MCNC: 9.3 MG/DL (ref 8.3–10.6)
CHLORIDE BLD-SCNC: 102 MMOL/L (ref 99–110)
CHOLESTEROL, TOTAL: 201 MG/DL (ref 0–199)
CO2: 25 MMOL/L (ref 21–32)
CREAT SERPL-MCNC: 0.8 MG/DL (ref 0.6–1.1)
FOLATE: 14.51 NG/ML (ref 4.78–24.2)
GFR AFRICAN AMERICAN: >60
GFR NON-AFRICAN AMERICAN: >60
GLUCOSE BLD-MCNC: 92 MG/DL (ref 70–99)
HDLC SERPL-MCNC: 85 MG/DL (ref 40–60)
INR BLD: 1.05 (ref 0.88–1.12)
IRON SATURATION: 18 % (ref 15–50)
IRON: 73 UG/DL (ref 37–145)
LDL CHOLESTEROL CALCULATED: 107 MG/DL
POTASSIUM SERPL-SCNC: 4.4 MMOL/L (ref 3.5–5.1)
PROTHROMBIN TIME: 11.9 SEC (ref 9.9–12.7)
SODIUM BLD-SCNC: 140 MMOL/L (ref 136–145)
TOTAL IRON BINDING CAPACITY: 401 UG/DL (ref 260–445)
TOTAL PROTEIN: 7.4 G/DL (ref 6.4–8.2)
TRIGL SERPL-MCNC: 46 MG/DL (ref 0–150)
TSH REFLEX: 2.63 UIU/ML (ref 0.27–4.2)
VITAMIN B-12: 1391 PG/ML (ref 211–911)
VITAMIN D 25-HYDROXY: 30 NG/ML
VLDLC SERPL CALC-MCNC: 9 MG/DL

## 2022-03-12 LAB
ALPHA-TOCOPHEROL: 10.3 MG/L (ref 5.5–18)
GAMMA-TOCOPHEROL: 1 MG/L (ref 0–6)
RETINYL PALMITATE: <0.02 MG/L (ref 0–0.1)
VITAMIN A LEVEL: 0.64 MG/L (ref 0.3–1.2)
VITAMIN A, INTERP: NORMAL

## 2022-03-14 LAB — VITAMIN B1 WHOLE BLOOD: 96 NMOL/L (ref 70–180)

## 2022-03-31 PROBLEM — Z01.818 PREOPERATIVE CLEARANCE: Status: RESOLVED | Noted: 2022-03-01 | Resolved: 2022-03-31

## 2022-04-07 ENCOUNTER — OFFICE VISIT (OUTPATIENT)
Dept: BARIATRICS/WEIGHT MGMT | Age: 52
End: 2022-04-07
Payer: COMMERCIAL

## 2022-04-07 VITALS
DIASTOLIC BLOOD PRESSURE: 78 MMHG | SYSTOLIC BLOOD PRESSURE: 139 MMHG | RESPIRATION RATE: 18 BRPM | HEIGHT: 64 IN | WEIGHT: 232.2 LBS | OXYGEN SATURATION: 98 % | HEART RATE: 91 BPM | BODY MASS INDEX: 39.64 KG/M2

## 2022-04-07 DIAGNOSIS — E66.01 MORBID OBESITY WITH BMI OF 40.0-44.9, ADULT (HCC): Primary | ICD-10-CM

## 2022-04-07 DIAGNOSIS — E78.2 MIXED HYPERLIPIDEMIA: ICD-10-CM

## 2022-04-07 DIAGNOSIS — K21.9 CHRONIC GERD: ICD-10-CM

## 2022-04-07 DIAGNOSIS — R73.03 PREDIABETES: ICD-10-CM

## 2022-04-07 DIAGNOSIS — I10 ESSENTIAL HYPERTENSION: ICD-10-CM

## 2022-04-07 DIAGNOSIS — K43.9 VENTRAL HERNIA WITHOUT OBSTRUCTION OR GANGRENE: ICD-10-CM

## 2022-04-07 PROCEDURE — G8427 DOCREV CUR MEDS BY ELIG CLIN: HCPCS | Performed by: SURGERY

## 2022-04-07 PROCEDURE — 1036F TOBACCO NON-USER: CPT | Performed by: SURGERY

## 2022-04-07 PROCEDURE — 3017F COLORECTAL CA SCREEN DOC REV: CPT | Performed by: SURGERY

## 2022-04-07 PROCEDURE — 99214 OFFICE O/P EST MOD 30 MIN: CPT | Performed by: SURGERY

## 2022-04-07 PROCEDURE — G8417 CALC BMI ABV UP PARAM F/U: HCPCS | Performed by: SURGERY

## 2022-04-07 NOTE — PROGRESS NOTES
[facility]) Physicians   [facility]  Roberta Whitney MD, [address] [facility], [address] [place] [address]    [place] [address] . Phone: [phone]  Fax: [phone]          Chief Complaint   Patient presents with    Obesity     2nd pre-surg         HPI:     Juan Chun is a very pleasant 45 y.o. female with Body mass index is 39.86 kg/m². / Chronic Obesity. Ralph Her has been struggling for several years now with obesity. [place] Her feels the weight is an obstacle to achieve and perform things in daily living as well risk on health. Patient  is very determined to lose weight and be healthy, and is working towards  surgical weight loss to achieve this goal. Pre-operative clearance and work up pending. Working hard to keep good dietary habits as well level of activity. Patient denies any nausea, vomiting, fevers, chills, shortness of breath, chest pain, cough, constipation or difficulty urinating. Pain Assessment   Denies any abdominal pain       Past Medical History:   Diagnosis Date    Arthritis     Asthma     Hypertension     Obesity (BMI 30-39. 9)      History reviewed. No pertinent surgical history. History reviewed. No pertinent family history. Social History     Tobacco Use    Smoking status: Never Smoker    Smokeless tobacco: Never Used   Substance Use Topics    Alcohol use: No     I counseled the patient on the importance of not smoking and risks of ETOH. No Known Allergies  Vitals:    04/07/22 0951 04/07/22 1016   BP: (!) 170/106 139/78   Pulse: 91    Resp: 18    SpO2: 98%    Weight: 232 lb 3.2 oz (105.3 kg)    Height: 5' 4\" (1.626 m)        Body mass index is 39.86 kg/m².     Lab Results   Component Value Date    WBC 5.5 10/02/2021    RBC 4.54 10/02/2021    HGB 12.7 10/02/2021    HCT 38.6 10/02/2021    MCV 85.0 10/02/2021    MCH 28.1 10/02/2021    MCHC 33.0 10/02/2021    MPV 7.8 10/02/2021    NEUTOPHILPCT 50.8 10/02/2021    LYMPHOPCT 37.8 10/02/2021    MONOPCT 9.4 10/02/2021    EOSRELPCT 1.5 10/02/2021    BASOPCT 0.5 10/02/2021    NEUTROABS 2.8 10/02/2021    LYMPHSABS 2.1 10/02/2021    MONOSABS 0.5 10/02/2021    EOSABS 0.1 10/02/2021     Lab Results   Component Value Date     03/08/2022    K 4.4 03/08/2022    K 3.6 10/02/2021     03/08/2022    CO2 25 03/08/2022    ANIONGAP 13 03/08/2022    GLUCOSE 92 03/08/2022    BUN 12 03/08/2022    CREATININE 0.8 03/08/2022    LABGLOM >60 03/08/2022    GFRAA >60 03/08/2022    CALCIUM 9.3 03/08/2022    PROT 7.4 03/08/2022    LABALBU 4.2 03/08/2022    AGRATIO 1.3 03/08/2022    BILITOT 0.3 03/08/2022    ALKPHOS 78 03/08/2022    ALT 20 03/08/2022    AST 16 03/08/2022    GLOB 4.0 10/02/2021     Lab Results   Component Value Date    CHOL 201 03/08/2022    TRIG 46 03/08/2022    HDL 85 03/08/2022    LDLCALC 107 03/08/2022    LABVLDL 9 03/08/2022     Lab Results   Component Value Date    TSHREFLEX 2.63 03/08/2022     Lab Results   Component Value Date    IRON 73 03/08/2022    TIBC 401 03/08/2022    LABIRON 18 03/08/2022     Lab Results   Component Value Date    EJGDEMIA87 1391 03/08/2022    FOLATE 14.51 03/08/2022     Lab Results   Component Value Date    VITD25 30.0 03/08/2022     Lab Results   Component Value Date    LABA1C 6.4 10/25/2021    .0 10/25/2021         Current Outpatient Medications:     ibuprofen (ADVIL;MOTRIN) 800 MG tablet, Take 1 tablet by mouth 2 times daily (with meals), Disp: 60 tablet, Rfl: 1    metFORMIN (GLUCOPHAGE-XR) 500 MG extended release tablet, Take 1 tablet by mouth daily (with breakfast), Disp: 30 tablet, Rfl: 5    PARoxetine (PAXIL CR) 12.5 MG extended release tablet, Take 12.5 mg by mouth daily , Disp: , Rfl:     FLUoxetine (PROZAC) 10 MG capsule, TAKE 1 CAPSULE BY MOUTH ONCE DAILY, Disp: , Rfl:     NIFEdipine (ADALAT CC) 30 MG extended release tablet, Take 30 mg by mouth daily , Disp: , Rfl:     Magic Mouthwash (MIRACLE MOUTHWASH), Swish and spit 10 mLs 4 times daily as needed for Irritation or Pain Dispense as Magic Mouthwash. Please add Equal Parts: 60 mL Maalox, 60 mL Viscous Lidocaine, 60 mL Benadryl to 60mL of Carafate. 10 ml swish and spit or swallow as directed above., Disp: 240 mL, Rfl: 0    albuterol sulfate HFA (VENTOLIN HFA) 108 (90 Base) MCG/ACT inhaler, Inhale 2 puffs into the lungs 4 times daily as needed for Wheezing or Shortness of Breath, Disp: 18 g, Rfl: 0    meloxicam (MOBIC) 15 MG tablet, Take 1 tablet by mouth daily as needed for Pain (Patient not taking: Reported on 12/21/2021), Disp: 30 tablet, Rfl: 0    Review of Systems - History obtained from the patient  General ROS: negative  Psychological ROS: negative  Ophthalmic ROS: negative  Neurological ROS: negative  ENT ROS: negative  Allergy and Immunology ROS: negative  Hematological and Lymphatic ROS: negative  Endocrine ROS: negative  Breast ROS: negative  Respiratory ROS: negative  Cardiovascular ROS: negative  Gastrointestinal ROS:negative  Genito-Urinary ROS: negative  Musculoskeletal ROS: negative   Skin ROS: negative    Physical Exam   Vitals Reviewed   Constitutional: Patient is oriented to person, place, and time. Patient appears well-developed and well-nourished. Patient is active and cooperative. Non-toxic appearance. No distress. HENT:   Head: Normocephalic and atraumatic. Head is without abrasion and without laceration. Hair is normal.   Right Ear: External ear normal. No lacerations. No drainage, swelling . Left Ear: External ear normal. No lacerations. No drainage, swelling. Nose/Mouth: face mask in place  Eyes: Conjunctivae, EOM and lids are normal. Right eye exhibits no discharge. No foreign body present in the right eye. Left eye exhibits no discharge. No foreign body present in the left eye. No scleral icterus. Neck: Trachea normal and normal range of motion. No JVD present. Pulmonary/Chest: Effort normal. No accessory muscle usage or stridor. No apnea. No respiratory distress. Cardiovascular: Normal rate and no JVD. Abdominal: Normal appearance. Patient exhibits no distension. Abdomen is soft, obese, non tender. Musculoskeletal: Normal range of motion. Patient exhibits no edema. Neurological: Patient is alert and oriented to person, place, and time. Patient has normal strength. GCS eye subscore is 4. GCS verbal subscore is 5. GCS motor subscore is 6. Skin: Skin is warm and dry. No abrasion and no rash noted. Patient is not diaphoretic. No cyanosis or erythema. Psychiatric: Patient has a normal mood and affect. Speech is normal and behavior is normal. Cognition and memory are normal.       A/P    Vandana Roche is 45 y.o. female, Body mass index is 39.86 kg/m². pre surgery, has lost 3.4 lbs since last visit. The patient underwent extensive dietary counseling with registered dietician. I have reviewed, discussed and agree with the dietary plan. Patient is trying hard to keep good dietary and behavior modifications. Patient is monitoring portion sizes, food choices and liquid calories. Patient is trying to exercise regularly as much as possible. Obesity as a disease is considered a high risk to patients overall health and should therefore be considered a high risk disease state. Advised the patient that not getting there weight under control, that could increase risk of complications/worsening of those conditions on the long-term. (Goal of weight loss surgery is to alleviate/control some of those co-morbidities)    Now with Covid-19 pandemic, CDC and health authorities does classify obese patients as vulnerable and high risk as well. Which makes weight loss a priority for improvement of their wellbeing and overall health. I encouraged the patient to continue exercise and keeping healthy eating habits. Discussed pre-op labs and work up till now. Also counseled the patient extensively on Surgery.        The visit today, included any number of the following: Bariatric Preoperative work up/protocols, review of labs, imaging, provider notes, outside hospital records, performing examination/evaluation, counseling patient and/or family, ordering medications/tests, placing referrals and communication with referring physicians, coordination of care; discussing dietary plan/recall with the patient as well with registered dietitian and documentation in the EHR. Of note, the above was done during same day of the actual patient encounter. Dre Olsen is here for her second presurgical visit. Patient overall doing well. Discussed with the patient her labs which showed hyperlipidemia. Discussed with the patient the preoperative work-up so far encouraged her to go ahead and schedule the remaining ones. We will see the patient next month for continued follow-up. We discussed how her excess weight affects her overall health and importance of weight loss, healthy diet and active lifestyle to alleviate those co morbid conditions, otherwise risk deterioration. Morbid Obesity: Body mass index is 39.86 kg/m². [x] Continue to make dietary and lifestyle modifications. [x] Plan for Future laparoscopic sleeve gastrectomy. [x] Return for follow-up next month. Chronic GERD:   [x] Continue to make dietary and lifestyle modifications. [x] Plan for EGD to evaluate the stomach. Essential Hypertension:  [x] Continue to make dietary and lifestyle modifications. [x] Reviewed the importance of checking blood pressure. [x] Continue to follow up with their PCP for medication management and monitoring. Prediabetes:   [x] Continue to make dietary and lifestyle modifications. [x] Reviewed the importance of checking blood sugars. [x] Continue to follow up with their PCP for medication management and monitoring. Hyperlipidemia:   [x] Continue to make dietary and lifestyle modifications.   [x] Continue to follow up with their PCP for medication management and monitoring. Patient advised that its their responsibility to follow up for studies, referrals and/or labs ordered today.

## 2022-04-07 NOTE — PROGRESS NOTES
Dmitriy Castelan lost 3.4 lbs over 1 month. Pt declined to use  during visit. Pt currently fasting for Ramadan, states she always skips lunch anyway. Breakfast: Egg + small amount bread  Snack: None  Lunch: None  Snack: None  Dinner: Salad w/ oil/vinegar + chix/fish/meat  Snack: Grapes/orange/apple + sometimes fresh fish    Is pt consuming smaller portions? yes \"little bit, come down everything\"    Is pt consuming at least 64 oz of fluids per day? 2 Liters water    Is pt consuming carbonated, caffeinated, or sugary beverages? yes coffee/tea w/ sugar, no juice/soda    Has pt sampled Unjury and/or Nectar protein? Pt took photo wants to buy on her own. Samples provided today    Has patient attended a support group?  Not scheduled     Exercise: not walking this week d/t Ramadan     Plan/Recommendations:   - Eat 3X/day with a protein food and     Handouts: None    Lovie Denver, RD, LD

## 2022-05-03 ENCOUNTER — OFFICE VISIT (OUTPATIENT)
Dept: ORTHOPEDIC SURGERY | Age: 52
End: 2022-05-03
Payer: COMMERCIAL

## 2022-05-03 VITALS — BODY MASS INDEX: 38.93 KG/M2 | WEIGHT: 228 LBS | HEIGHT: 64 IN

## 2022-05-03 DIAGNOSIS — M17.11 PRIMARY OSTEOARTHRITIS OF RIGHT KNEE: ICD-10-CM

## 2022-05-03 DIAGNOSIS — M47.816 LUMBAR SPONDYLOSIS: ICD-10-CM

## 2022-05-03 DIAGNOSIS — M17.12 PRIMARY OSTEOARTHRITIS OF LEFT KNEE: Primary | ICD-10-CM

## 2022-05-03 DIAGNOSIS — E66.01 MORBID OBESITY (HCC): ICD-10-CM

## 2022-05-03 DIAGNOSIS — M51.36 DDD (DEGENERATIVE DISC DISEASE), LUMBAR: ICD-10-CM

## 2022-05-03 PROCEDURE — 99213 OFFICE O/P EST LOW 20 MIN: CPT | Performed by: ORTHOPAEDIC SURGERY

## 2022-05-03 PROCEDURE — 3017F COLORECTAL CA SCREEN DOC REV: CPT | Performed by: ORTHOPAEDIC SURGERY

## 2022-05-03 PROCEDURE — G8417 CALC BMI ABV UP PARAM F/U: HCPCS | Performed by: ORTHOPAEDIC SURGERY

## 2022-05-03 PROCEDURE — G8427 DOCREV CUR MEDS BY ELIG CLIN: HCPCS | Performed by: ORTHOPAEDIC SURGERY

## 2022-05-03 PROCEDURE — 20610 DRAIN/INJ JOINT/BURSA W/O US: CPT | Performed by: ORTHOPAEDIC SURGERY

## 2022-05-03 PROCEDURE — 1036F TOBACCO NON-USER: CPT | Performed by: ORTHOPAEDIC SURGERY

## 2022-05-03 RX ORDER — BUPIVACAINE HYDROCHLORIDE 2.5 MG/ML
3 INJECTION, SOLUTION INFILTRATION; PERINEURAL ONCE
Status: COMPLETED | OUTPATIENT
Start: 2022-05-03 | End: 2022-05-03

## 2022-05-03 RX ORDER — IBUPROFEN 800 MG/1
800 TABLET ORAL 2 TIMES DAILY WITH MEALS
Qty: 60 TABLET | Refills: 1 | Status: SHIPPED | OUTPATIENT
Start: 2022-05-03 | End: 2022-07-28

## 2022-05-03 RX ORDER — TRIAMCINOLONE ACETONIDE 40 MG/ML
80 INJECTION, SUSPENSION INTRA-ARTICULAR; INTRAMUSCULAR ONCE
Status: COMPLETED | OUTPATIENT
Start: 2022-05-03 | End: 2022-05-03

## 2022-05-03 RX ADMIN — TRIAMCINOLONE ACETONIDE 80 MG: 40 INJECTION, SUSPENSION INTRA-ARTICULAR; INTRAMUSCULAR at 11:04

## 2022-05-03 RX ADMIN — BUPIVACAINE HYDROCHLORIDE 7.5 MG: 2.5 INJECTION, SOLUTION INFILTRATION; PERINEURAL at 11:03

## 2022-05-03 RX ADMIN — BUPIVACAINE HYDROCHLORIDE 7.5 MG: 2.5 INJECTION, SOLUTION INFILTRATION; PERINEURAL at 11:02

## 2022-05-03 RX ADMIN — TRIAMCINOLONE ACETONIDE 80 MG: 40 INJECTION, SUSPENSION INTRA-ARTICULAR; INTRAMUSCULAR at 11:03

## 2022-05-03 NOTE — PROGRESS NOTES
Elisa Yost  [id number]  May 3, 2022    Chief Complaint   Patient presents with    Follow-up     Bilateral knee. History: The patient is a 57-year-old female who is here for evaluation of both knees. The patient last received bilateral knee injection back in January. Injections provided significant relief. Her pain recently returned. She did recently meet with the bariatric team and she is greatly considering bariatric surgery. She has lost approximately 3 pounds since her last visit. She does take ibuprofen regularly. She has difficulty getting up from a seated position. The patient's  past medical history, medications, allergies,  family history, social history, and have been reviewed, and dated and are recorded in the chart. Pertinent items are noted in HPI. Review of systems reviewed from Pertinent History Form dated on 6/23/2021 and available in the patient's chart under the Media tab. Vitals:  Ht 5' 4\" (1.626 m)   Wt 228 lb (103.4 kg)   LMP 06/10/2020   BMI 39.14 kg/m²     Physical: Ms. Elisa Yost appears well, she is in no apparent distress, she demonstrates appropriate mood & affect. She is alert and oriented to person, place and time. Examination of the bilateral lower extremity reveals no pain with range of motion of the hip. She has generalized tenderness to palpation about the joint line of the bilateral knee. Range of motion is from 0 degrees to 115 degrees bilaterally. Strength is 4+ to 5/5 for all muscle groups about the bilateral knee. There is patellofemoral crepitus with range of motion of the bilateral knee. Varus and valgus stressing of the knees reveals no evidence of instability. There is no clear evidence of  effusion in the knees. Anterior drawer and Lachman are negative bilaterally. Examination of the skin reveals no rashes, ulceration, or lesion, bilaterally in the lower extremities. Sensation to both lower extremities is grossly intact.  Exam of both feet reveals pedal pulses intact and brisk cap refill. Patient is able to dorsiflex and wiggle all toes. Deep tendon reflexes of the lower extremities are normal and symmetric. X-rays: We again reviewed her knee x-rays from the past and she has bilateral knee osteoarthritis. The changes are most severe medially. Impression: Bilateral knee osteoarthritis #2 lumbar spondylosis #3 morbid obesity #4 lumbar degenerative disc disease    Plan: At this time, the bilateral knees were injected under sterile conditions. After a Betadine prep of the joints, 3 cc of 0.25% Marcaine and 2 cc of 40 mg Kenalog were injected into the knees. The patient tolerated the injections rather well. The patient was instructed to follow up for any signs of infection. Natural history and expected course discussed. Questions answered. Reduction in offending activity. OTC analgesics as needed. The patient will follow up with me in 3 months. The patient will continue to work on weight loss. She was given a refill on the ibuprofen.

## 2022-05-09 ENCOUNTER — TELEPHONE (OUTPATIENT)
Dept: BARIATRICS/WEIGHT MGMT | Age: 52
End: 2022-05-09

## 2022-05-09 NOTE — TELEPHONE ENCOUNTER
Tried to call as reminder egd call scheduled for 05/13/2022 with arrival of 9:15 am- was disconnected.

## 2022-06-14 ENCOUNTER — OFFICE VISIT (OUTPATIENT)
Dept: BARIATRICS/WEIGHT MGMT | Age: 52
End: 2022-06-14
Payer: COMMERCIAL

## 2022-06-14 VITALS
BODY MASS INDEX: 38.69 KG/M2 | RESPIRATION RATE: 18 BRPM | OXYGEN SATURATION: 97 % | DIASTOLIC BLOOD PRESSURE: 88 MMHG | WEIGHT: 232.2 LBS | SYSTOLIC BLOOD PRESSURE: 134 MMHG | HEART RATE: 84 BPM | HEIGHT: 65 IN

## 2022-06-14 DIAGNOSIS — E78.2 MIXED HYPERLIPIDEMIA: ICD-10-CM

## 2022-06-14 DIAGNOSIS — I10 ESSENTIAL HYPERTENSION: ICD-10-CM

## 2022-06-14 DIAGNOSIS — E66.01 MORBID OBESITY WITH BMI OF 40.0-44.9, ADULT (HCC): Primary | ICD-10-CM

## 2022-06-14 DIAGNOSIS — E66.9 OBESITY (BMI 30-39.9): ICD-10-CM

## 2022-06-14 DIAGNOSIS — K43.9 VENTRAL HERNIA WITHOUT OBSTRUCTION OR GANGRENE: ICD-10-CM

## 2022-06-14 DIAGNOSIS — K21.9 CHRONIC GERD: ICD-10-CM

## 2022-06-14 DIAGNOSIS — R73.03 PREDIABETES: ICD-10-CM

## 2022-06-14 PROCEDURE — 1036F TOBACCO NON-USER: CPT | Performed by: SURGERY

## 2022-06-14 PROCEDURE — G8427 DOCREV CUR MEDS BY ELIG CLIN: HCPCS | Performed by: SURGERY

## 2022-06-14 PROCEDURE — 99214 OFFICE O/P EST MOD 30 MIN: CPT | Performed by: SURGERY

## 2022-06-14 PROCEDURE — G8417 CALC BMI ABV UP PARAM F/U: HCPCS | Performed by: SURGERY

## 2022-06-14 PROCEDURE — 3017F COLORECTAL CA SCREEN DOC REV: CPT | Performed by: SURGERY

## 2022-06-14 NOTE — PROGRESS NOTES
[facility]) Physicians   [employer] [facility] [employer]  Sandi Koch MD, [address] [facility], [address] [place] [address]    [place]  [id number] . Phone: [phone]  Fax: [phone]          Chief Complaint   Patient presents with    Obesity     3rd pre-surg         HPI:     Bo Early is a very pleasant 45 y.o. female with Body mass index is 38.64 kg/m². / Chronic Obesity. Laura Marinelli has been struggling for several years now with obesity. Laura Marinelli feels the weight is an obstacle to achieve and perform things in daily living as well risk on health. Patient  is very determined to lose weight and be healthy, and is working towards  surgical weight loss to achieve this goal. Pre-operative clearance and work up pending. Working hard to keep good dietary habits as well level of activity. Patient denies any nausea, vomiting, fevers, chills, shortness of breath, chest pain, cough, constipation or difficulty urinating. Pain Assessment   Denies any abdominal pain       Past Medical History:   Diagnosis Date    Arthritis     Asthma     Hypertension     Obesity (BMI 30-39. 9)      No past surgical history on file. No family history on file. Social History     Tobacco Use    Smoking status: Never Smoker    Smokeless tobacco: Never Used   Substance Use Topics    Alcohol use: No     I counseled the patient on the importance of not smoking and risks of ETOH. No Known Allergies  Vitals:    06/14/22 1313   BP: 134/88   Pulse: 84   Resp: 18   SpO2: 97%   Weight: 232 lb 3.2 oz (105.3 kg)   Height: 5' 5\" (1.651 m)       Body mass index is 38.64 kg/m².     Lab Results   Component Value Date    WBC 5.5 10/02/2021    RBC 4.54 10/02/2021    HGB 12.7 10/02/2021    HCT 38.6 10/02/2021    MCV 85.0 10/02/2021    MCH 28.1 10/02/2021    MCHC 33.0 10/02/2021    MPV 7.8 10/02/2021    NEUTOPHILPCT 50.8 10/02/2021    LYMPHOPCT 37.8 10/02/2021    MONOPCT 9.4 10/02/2021    EOSRELPCT 1.5 10/02/2021    BASOPCT 0.5 10/02/2021 NEUTROABS 2.8 10/02/2021    LYMPHSABS 2.1 10/02/2021    MONOSABS 0.5 10/02/2021    EOSABS 0.1 10/02/2021     Lab Results   Component Value Date     03/08/2022    K 4.4 03/08/2022    K 3.6 10/02/2021     03/08/2022    CO2 25 03/08/2022    ANIONGAP 13 03/08/2022    GLUCOSE 92 03/08/2022    BUN 12 03/08/2022    CREATININE 0.8 03/08/2022    LABGLOM >60 03/08/2022    GFRAA >60 03/08/2022    CALCIUM 9.3 03/08/2022    PROT 7.4 03/08/2022    LABALBU 4.2 03/08/2022    AGRATIO 1.3 03/08/2022    BILITOT 0.3 03/08/2022    ALKPHOS 78 03/08/2022    ALT 20 03/08/2022    AST 16 03/08/2022    GLOB 4.0 10/02/2021     Lab Results   Component Value Date    CHOL 201 03/08/2022    TRIG 46 03/08/2022    HDL 85 03/08/2022    LDLCALC 107 03/08/2022    LABVLDL 9 03/08/2022     Lab Results   Component Value Date    TSHREFLEX 2.63 03/08/2022     Lab Results   Component Value Date    IRON 73 03/08/2022    TIBC 401 03/08/2022    LABIRON 18 03/08/2022     Lab Results   Component Value Date    EXFIGWWA50 1391 03/08/2022    FOLATE 14.51 03/08/2022     Lab Results   Component Value Date    VITD25 30.0 03/08/2022     Lab Results   Component Value Date    LABA1C 6.4 10/25/2021    .0 10/25/2021         Current Outpatient Medications:     ibuprofen (ADVIL;MOTRIN) 800 MG tablet, Take 1 tablet by mouth 2 times daily (with meals), Disp: 60 tablet, Rfl: 1    metFORMIN (GLUCOPHAGE-XR) 500 MG extended release tablet, Take 1 tablet by mouth daily (with breakfast), Disp: 30 tablet, Rfl: 5    PARoxetine (PAXIL CR) 12.5 MG extended release tablet, Take 12.5 mg by mouth daily , Disp: , Rfl:     FLUoxetine (PROZAC) 10 MG capsule, TAKE 1 CAPSULE BY MOUTH ONCE DAILY, Disp: , Rfl:     NIFEdipine (ADALAT CC) 30 MG extended release tablet, Take 30 mg by mouth daily , Disp: , Rfl:     Magic Mouthwash (MIRACLE MOUTHWASH), Swish and spit 10 mLs 4 times daily as needed for Irritation or Pain Dispense as Magic Mouthwash.   Please add Equal Parts: 60 mL Maalox, 60 mL Viscous Lidocaine, 60 mL Benadryl to 60mL of Carafate. 10 ml swish and spit or swallow as directed above., Disp: 240 mL, Rfl: 0    albuterol sulfate HFA (VENTOLIN HFA) 108 (90 Base) MCG/ACT inhaler, Inhale 2 puffs into the lungs 4 times daily as needed for Wheezing or Shortness of Breath, Disp: 18 g, Rfl: 0    meloxicam (MOBIC) 15 MG tablet, Take 1 tablet by mouth daily as needed for Pain, Disp: 30 tablet, Rfl: 0    Review of Systems - History obtained from the patient  General ROS: negative  Psychological ROS: negative  Ophthalmic ROS: negative  Neurological ROS: negative  ENT ROS: negative  Allergy and Immunology ROS: negative  Hematological and Lymphatic ROS: negative  Endocrine ROS: negative  Breast ROS: negative  Respiratory ROS: negative  Cardiovascular ROS: negative  Gastrointestinal ROS:negative  Genito-Urinary ROS: negative  Musculoskeletal ROS: negative   Skin ROS: negative    Physical Exam   Vitals Reviewed   Constitutional: Patient is oriented to person, place, and time. Patient appears well-developed and well-nourished. Patient is active and cooperative. Non-toxic appearance. No distress. HENT:   Head: Normocephalic and atraumatic. Head is without abrasion and without laceration. Hair is normal.   Right Ear: External ear normal. No lacerations. No drainage, swelling . Left Ear: External ear normal. No lacerations. No drainage, swelling. Nose/Mouth: face mask in place  Eyes: Conjunctivae, EOM and lids are normal. Right eye exhibits no discharge. No foreign body present in the right eye. Left eye exhibits no discharge. No foreign body present in the left eye. No scleral icterus. Neck: Trachea normal and normal range of motion. No JVD present. Pulmonary/Chest: Effort normal. No accessory muscle usage or stridor. No apnea. No respiratory distress. Cardiovascular: Normal rate and no JVD. Abdominal: Normal appearance. Patient exhibits no distension.  Abdomen is soft, obese, non tender. Musculoskeletal: Normal range of motion. Patient exhibits no edema. Neurological: Patient is alert and oriented to person, place, and time. Patient has normal strength. GCS eye subscore is 4. GCS verbal subscore is 5. GCS motor subscore is 6. Skin: Skin is warm and dry. No abrasion and no rash noted. Patient is not diaphoretic. No cyanosis or erythema. Psychiatric: Patient has a normal mood and affect. Speech is normal and behavior is normal. Cognition and memory are normal.       A/P    Mily Comber is 45 y.o. female, Body mass index is 38.64 kg/m². pre surgery, has stable weight since last visit. The patient underwent extensive dietary counseling with registered dietician. I have reviewed, discussed and agree with the dietary plan. Patient is trying hard to keep good dietary and behavior modifications. Patient is monitoring portion sizes, food choices and liquid calories. Patient is trying to exercise regularly as much as possible. Obesity as a disease is considered a high risk to patients overall health and should therefore be considered a high risk disease state. Advised the patient that not getting there weight under control, that could increase risk of complications/worsening of those conditions on the long-term. (Goal of weight loss surgery is to alleviate/control some of those co-morbidities)    Now with Covid-19 pandemic, CDC and health authorities does classify obese patients as vulnerable and high risk as well. Which makes weight loss a priority for improvement of their wellbeing and overall health. I encouraged the patient to continue exercise and keeping healthy eating habits. Discussed pre-op labs and work up till now. Also counseled the patient extensively on Surgery.        The visit today, included any number of the following: Bariatric Preoperative work up/protocols, review of labs, imaging, provider notes, outside hospital records, performing examination/evaluation, counseling patient and/or family, ordering medications/tests, placing referrals and communication with referring physicians, coordination of care; discussing dietary plan/recall with the patient as well with registered dietitian and documentation in the EHR. Of note, the above was done during same day of the actual patient encounter. Andre Keon here for her third presurgical visit. Patient is working on her preoperative clearances. Discussed with the patient importance of getting them done in a timely manner weight. Also discussed the importance of following the dietary recommendations. We will see the patient next month for continued follow-up. We discussed how her excess weight affects her overall health and importance of weight loss, healthy diet and active lifestyle to alleviate those co morbid conditions, otherwise risk deterioration. Severe Obesity: Body mass index is 38.64 kg/m². [x] Continue to make dietary and lifestyle modifications. [x] Plan for Future laparoscopic sleeve gastrectomy. [x] Return for follow-up next month. Chronic GERD:   [x] Continue to make dietary and lifestyle modifications. [x] Plan for EGD to evaluate the stomach. Essential Hypertension:  [x] Continue to make dietary and lifestyle modifications. [x] Reviewed the importance of checking blood pressure. [x] Continue to follow up with their PCP for medication management and monitoring. Prediabetes:   [x] Continue to make dietary and lifestyle modifications. [x] Reviewed the importance of checking blood sugars. [x] Continue to follow up with their PCP for medication management and monitoring. Hyperlipidemia:   [x] Continue to make dietary and lifestyle modifications. [x] Continue to follow up with their PCP for medication management and monitoring. Patient advised that its their responsibility to follow up for studies, referrals and/or labs ordered today.

## 2022-06-14 NOTE — PROGRESS NOTES
Marika Hamilton gained 1 lb over past 2.5 months. Pt declined to use  during visit and son present during visit. Is pt eating at least 4 times everyday? Yes     Is pt eating a lean protein source with all meals and snacks? Yes - egg, salad with lamb/fish, rice with fish/lamb sometimes with idris/tomato/onion, nuts     Has pt decreased their portions using the plate method? Inconsistent     Is pt choosing low fat/sugar free options? Sometimes - having cookies     Is pt drinking at least 64 oz of clear liquids everyday? Yes     Has pt stopped drinking carbonation, caffeinated, and sugar sweetened beverages? No - Drinks water, regular coffee with 2% milk, sleepy time tea with sugar     Has pt sampled Unjury and/or Nectar protein? No - Pt took photo and wants to buy on her own. Pt has been provided with samples     Has pt attended a support group? Not scheduled     Participating in intentional exercise? Started but not as much lately d/t not feeling too well     Plan/Recommendations:    Work through [employer] on healthy food choices  Be consistent with portions   Switch to sweetener and decaf coffee  Attend SG   Try protein powder     Handouts: SG schedule     Dwight Chang, RD, LD

## 2022-06-17 NOTE — PROGRESS NOTES
Patient reached __x__ yes  _____ no   VM instructions left ____ yes   phone number ________                                ____ no-office notified          Date ___[id number]______  Time _1100______  Arrival __0900____    Nothing to eat or drink after midnight. Responsible adult 25 or older to stay on site while you are here-drive you home-stay with you after your procedure. Follow any instructions your doctors office has given you. Bring a complete list of all your medications and supplements including name,dose,how often taken the day of your procedure  If you normally take the following medications in the morning please do so the AM of your procedure with a small sip of water       Heart,blood pressure,seizure,thyroid or breathing medications-use your inhalers       DO NOT take blood pressure medications ending in \"jairo\" or \"pril\" the AM of procedure or evening prior    Follow your doctors instructions regarding stopping or taking  any blood thinners-if you do not have instructions-call them  Any questions call your doctor    VISITOR POLICY(subject to change)             The current policy is 2 visitors per patient. There are no children allowed. Everyone must mask. Visiting hours are 8a-8p. Overnight visitors will be at the discretion of the nurse.

## 2022-06-20 NOTE — PROGRESS NOTES
Pt.and [employer] notified of procedure time change to 1030 and  needs to be available from [phone].

## 2022-06-24 ENCOUNTER — HOSPITAL ENCOUNTER (OUTPATIENT)
Age: 52
Setting detail: OUTPATIENT SURGERY
Discharge: HOME | End: 2022-06-24
Attending: SURGERY | Admitting: SURGERY
Payer: COMMERCIAL

## 2022-06-24 ENCOUNTER — ANESTHESIA (OUTPATIENT)
Dept: ENDOSCOPY | Age: 52
End: 2022-06-24
Payer: COMMERCIAL

## 2022-06-24 ENCOUNTER — ANESTHESIA EVENT (OUTPATIENT)
Dept: ENDOSCOPY | Age: 52
End: 2022-06-24
Payer: COMMERCIAL

## 2022-06-24 VITALS
WEIGHT: 229.1 LBS | HEART RATE: 95 BPM | DIASTOLIC BLOOD PRESSURE: 95 MMHG | BODY MASS INDEX: 38.17 KG/M2 | HEIGHT: 65 IN | RESPIRATION RATE: 20 BRPM | SYSTOLIC BLOOD PRESSURE: 149 MMHG | TEMPERATURE: 98 F | OXYGEN SATURATION: 100 %

## 2022-06-24 DIAGNOSIS — K21.9 GASTROESOPHAGEAL REFLUX DISEASE, UNSPECIFIED WHETHER ESOPHAGITIS PRESENT: ICD-10-CM

## 2022-06-24 PROCEDURE — 3700000000 HC ANESTHESIA ATTENDED CARE: Performed by: SURGERY

## 2022-06-24 PROCEDURE — 3609012400 HC EGD TRANSORAL BIOPSY SINGLE/MULTIPLE: Performed by: SURGERY

## 2022-06-24 PROCEDURE — 7100000011 HC PHASE II RECOVERY - ADDTL 15 MIN: Performed by: SURGERY

## 2022-06-24 PROCEDURE — 2500000003 HC RX 250 WO HCPCS: Performed by: NURSE ANESTHETIST, CERTIFIED REGISTERED

## 2022-06-24 PROCEDURE — 3700000001 HC ADD 15 MINUTES (ANESTHESIA): Performed by: SURGERY

## 2022-06-24 PROCEDURE — 2580000003 HC RX 258: Performed by: SURGERY

## 2022-06-24 PROCEDURE — 43239 EGD BIOPSY SINGLE/MULTIPLE: CPT | Performed by: SURGERY

## 2022-06-24 PROCEDURE — 88305 TISSUE EXAM BY PATHOLOGIST: CPT

## 2022-06-24 PROCEDURE — 2709999900 HC NON-CHARGEABLE SUPPLY: Performed by: SURGERY

## 2022-06-24 PROCEDURE — 6360000002 HC RX W HCPCS: Performed by: NURSE ANESTHETIST, CERTIFIED REGISTERED

## 2022-06-24 PROCEDURE — 7100000010 HC PHASE II RECOVERY - FIRST 15 MIN: Performed by: SURGERY

## 2022-06-24 RX ORDER — SODIUM CHLORIDE 9 MG/ML
INJECTION, SOLUTION INTRAVENOUS CONTINUOUS
Status: DISCONTINUED | OUTPATIENT
Start: 2022-06-24 | End: 2022-06-24 | Stop reason: HOSPADM

## 2022-06-24 RX ORDER — GLYCOPYRROLATE 0.2 MG/ML
INJECTION INTRAMUSCULAR; INTRAVENOUS PRN
Status: DISCONTINUED | OUTPATIENT
Start: 2022-06-24 | End: 2022-06-24 | Stop reason: SDUPTHER

## 2022-06-24 RX ORDER — LIDOCAINE HYDROCHLORIDE 20 MG/ML
INJECTION, SOLUTION EPIDURAL; INFILTRATION; INTRACAUDAL; PERINEURAL PRN
Status: DISCONTINUED | OUTPATIENT
Start: 2022-06-24 | End: 2022-06-24 | Stop reason: SDUPTHER

## 2022-06-24 RX ORDER — PROPOFOL 10 MG/ML
INJECTION, EMULSION INTRAVENOUS PRN
Status: DISCONTINUED | OUTPATIENT
Start: 2022-06-24 | End: 2022-06-24 | Stop reason: SDUPTHER

## 2022-06-24 RX ORDER — OMEPRAZOLE 20 MG/1
20 CAPSULE, DELAYED RELEASE ORAL
Qty: 90 CAPSULE | Refills: 1 | Status: SHIPPED | OUTPATIENT
Start: 2022-06-24

## 2022-06-24 RX ADMIN — GLYCOPYRROLATE 0.2 MG: 0.2 INJECTION, SOLUTION INTRAMUSCULAR; INTRAVENOUS at 09:25

## 2022-06-24 RX ADMIN — SODIUM CHLORIDE: 9 INJECTION, SOLUTION INTRAVENOUS at 09:17

## 2022-06-24 RX ADMIN — PROPOFOL 50 MG: 10 INJECTION, EMULSION INTRAVENOUS at 09:52

## 2022-06-24 RX ADMIN — LIDOCAINE HYDROCHLORIDE 50 MG: 20 INJECTION, SOLUTION EPIDURAL; INFILTRATION; INTRACAUDAL; PERINEURAL at 09:44

## 2022-06-24 RX ADMIN — PROPOFOL 50 MG: 10 INJECTION, EMULSION INTRAVENOUS at 09:46

## 2022-06-24 RX ADMIN — PROPOFOL 50 MG: 10 INJECTION, EMULSION INTRAVENOUS at 09:44

## 2022-06-24 RX ADMIN — PROPOFOL 50 MG: 10 INJECTION, EMULSION INTRAVENOUS at 09:47

## 2022-06-24 RX ADMIN — PROPOFOL 50 MG: 10 INJECTION, EMULSION INTRAVENOUS at 09:48

## 2022-06-24 ASSESSMENT — PAIN SCALES - GENERAL: PAINLEVEL_OUTOF10: 0

## 2022-06-24 NOTE — H&P
Department of [facility] [address] Physicians   [employer]  Attending Pre-operative History and Physical      DIAGNOSIS:  Obesity    INDICATION:  Pre-op    PROCEDURE:  EGD    CHIEF COMPLAINT:  Obesity    History Obtained From:  patient    HISTORY OF PRESENT ILLNESS:    The patient is a 45 y.o. female with significant past medical history of   Patient Active Problem List   Diagnosis    Lumbar spondylosis    Lumbar pain with radiation down both legs    Obesity (BMI 30-39. 9)    Morbid obesity with BMI of 40.0-44.9, adult ([address]    Chronic GERD    Essential hypertension    Prediabetes    Ventral hernia without obstruction or gangrene    Mixed hyperlipidemia      who presents for pre-op EGD    Past Medical History:        Diagnosis Date    Arthritis     Asthma     Hypertension     Obesity (BMI 30-39. 9)      Past Surgical History:    History reviewed. No pertinent surgical history. Medications Prior to Admission:   Medications Prior to Admission: diphenhydrAMINE-APAP, sleep, (TYLENOL PM EXTRA STRENGTH PO), Take 2 tablets by mouth as needed  ibuprofen (ADVIL;MOTRIN) 800 MG tablet, Take 1 tablet by mouth 2 times daily (with meals)  metFORMIN (GLUCOPHAGE-XR) 500 MG extended release tablet, Take 1 tablet by mouth daily (with breakfast)  PARoxetine (PAXIL CR) 12.5 MG extended release tablet, Take 12.5 mg by mouth daily   FLUoxetine (PROZAC) 10 MG capsule, TAKE 1 CAPSULE BY MOUTH ONCE DAILY  NIFEdipine (ADALAT CC) 30 MG extended release tablet, Take 30 mg by mouth daily   Magic Mouthwash (MIRACLE MOUTHWASH), Swish and spit 10 mLs 4 times daily as needed for Irritation or Pain Dispense as Magic Mouthwash. Please add Equal Parts: 60 mL Maalox, 60 mL Viscous Lidocaine, 60 mL Benadryl to 60mL of Carafate. 10 ml swish and spit or swallow as directed above.   albuterol sulfate HFA (VENTOLIN HFA) 108 (90 Base) MCG/ACT inhaler, Inhale 2 puffs into the lungs 4 times daily as needed for Wheezing or Shortness of Breath  meloxicam (MOBIC) 15 MG tablet, Take 1 tablet by mouth daily as needed for Pain    Allergies:  Patient has no known allergies. Social History:   TOBACCO:   reports that she has never smoked. She has never used smokeless tobacco.  ETOH:   reports no history of alcohol use. Family History:   History reviewed. No pertinent family history. REVIEW OF SYSTEMS:    Review of Systems - History obtained from the patient  General ROS: negative  Psychological ROS: negative  Ophthalmic ROS: negative  Neurological ROS: negative  ENT ROS: negative  Allergy and Immunology ROS: negative  Hematological and Lymphatic ROS: negative  Endocrine ROS: negative  Breast ROS: negative  Respiratory ROS: negative  Cardiovascular ROS: negative  Gastrointestinal ROS:negative  Genito-Urinary ROS: negative  Musculoskeletal ROS: negative   Skin ROS: negative      PHYSICAL EXAM:      BP (!) 153/97   Pulse 91   Temp 98.3 °F (36.8 °C) (Temporal)   Resp 18   Ht 5' 5\" (1.651 m)   Wt 229 lb 1.6 oz (103.9 kg)   LMP 06/10/2020   SpO2 99%   BMI 38.12 kg/m²  I      Physical Exam   Vitals Reviewed   Constitutional: Patient is oriented to person, place, and time. Patient appears well-developed and well-nourished. Patient is active and cooperative. Non-toxic appearance. No distress. HENT:   Head: Normocephalic and atraumatic. Head is without abrasion and without laceration. Hair is normal.   Right Ear: External ear normal. No lacerations. No drainage, swelling . Left Ear: External ear normal. No lacerations. No drainage, swelling. Nose: Nose normal. No nose lacerations or nasal deformity. Eyes: Conjunctivae, EOM and lids are normal. Right eye exhibits no discharge. No foreign body present in the right eye. Left eye exhibits no discharge. No foreign body present in the left eye. No scleral icterus. Neck: Trachea normal and normal range of motion. No JVD present.    Pulmonary/Chest: Effort normal. No accessory muscle usage or stridor. No apnea. No respiratory distress. Cardiovascular: Normal rate and no JVD. Abdominal: Normal appearance. Patient exhibits no distension. Musculoskeletal: Normal range of motion. Patient exhibits no edema. Neurological: Patient is alert and oriented to person, place, and time. Patient has normal strength. GCS eye subscore is 4. GCS verbal subscore is 5. GCS motor subscore is 6. Skin: Skin is warm and dry. No abrasion and no rash noted. Patient is not diaphoretic. No cyanosis or erythema. Psychiatric: Patient has a normal mood and affect. Speech is normal and behavior is normal. Cognition and memory are normal.       DATA:  CBC:   Lab Results   Component Value Date    WBC 5.5 10/02/2021    RBC 4.54 10/02/2021    HGB 12.7 10/02/2021    HCT 38.6 10/02/2021    MCV 85.0 10/02/2021    MCH 28.1 10/02/2021    MCHC 33.0 10/02/2021    RDW 15.8 10/02/2021     10/02/2021    MPV 7.8 10/02/2021     CMP:    Lab Results   Component Value Date     03/08/2022    K 4.4 03/08/2022    K 3.6 10/02/2021     03/08/2022    CO2 25 03/08/2022    BUN 12 03/08/2022    CREATININE 0.8 03/08/2022    GFRAA >60 03/08/2022    AGRATIO 1.3 03/08/2022    LABGLOM >60 03/08/2022    GLUCOSE 92 03/08/2022    PROT 7.4 03/08/2022    LABALBU 4.2 03/08/2022    CALCIUM 9.3 03/08/2022    BILITOT 0.3 03/08/2022    ALKPHOS 78 03/08/2022    AST 16 03/08/2022    ALT 20 03/08/2022       ASSESSMENT AND PLAN:    1. Patient is a 45 y.o. female with above specified procedure planned EGD with deep sedation  2. Procedure options, risks and benefits reviewed with patient. Patient expresses understanding.

## 2022-06-24 NOTE — ANESTHESIA PRE PROCEDURE
Department of Anesthesiology  Preprocedure Note       Name:  Danial Rendon   Age:  45 y.o.  :  1970                                          MRN:  [id number]         Date:  2022      Surgeon: Elinor Humphrey): Wale Stratton MD    Procedure: Procedure(s):  EGD DIAGNOSTIC ONLY    Medications prior to admission:   Prior to Admission medications    Medication Sig Start Date End Date Taking? Authorizing Provider   ibuprofen (ADVIL;MOTRIN) 800 MG tablet Take 1 tablet by mouth 2 times daily (with meals) 5/3/22   Henny Basurto MD   metFORMIN (GLUCOPHAGE-XR) 500 MG extended release tablet Take 1 tablet by mouth daily (with breakfast) 21   Kim Fernandez MD   PARoxetine (PAXIL CR) 12.5 MG extended release tablet Take 12.5 mg by mouth daily  21   Historical Provider, MD   FLUoxetine (PROZAC) 10 MG capsule TAKE 1 CAPSULE BY MOUTH ONCE DAILY 21   Historical Provider, MD   NIFEdipine (ADALAT CC) 30 MG extended release tablet Take 30 mg by mouth daily  21   Historical Provider, MD   Magic Mouthwash (MIRACLE MOUTHWASH) Swish and spit 10 mLs 4 times daily as needed for Irritation or Pain Dispense as Magic Mouthwash. Please add Equal Parts: 60 mL Maalox, 60 mL Viscous Lidocaine, 60 mL Benadryl to 60mL of Carafate. 10 ml swish and spit or swallow as directed above.  10/2/21   Villa Nash APRN - CNP   albuterol sulfate HFA (VENTOLIN HFA) 108 (90 Base) MCG/ACT inhaler Inhale 2 puffs into the lungs 4 times daily as needed for Wheezing or Shortness of Breath 10/2/21   NORMAN Short - CNP   meloxicam (MOBIC) 15 MG tablet Take 1 tablet by mouth daily as needed for Pain 21   Henny Basurto MD       Current medications:    Current Facility-Administered Medications   Medication Dose Route Frequency Provider Last Rate Last Admin    0.9 % sodium chloride infusion   IntraVENous Continuous Wale Stratton MD         Facility-Administered Medications Ordered in Other Encounters Medication Dose Route Frequency Provider Last Rate Last Admin    0.9 % sodium chloride infusion   IntraVENous Continuous Anyi Samuels MD           Allergies:  No Known Allergies    Problem List:    Patient Active Problem List   Diagnosis Code    Lumbar spondylosis M47.816    Lumbar pain with radiation down both legs M54.50, M79.604, M79.605    Obesity (BMI 30-39. 9) E66.9    Morbid obesity with BMI of 40.0-44.9, adult (HCC) E66.01, Z68.41    Chronic GERD K21.9    Essential hypertension I10    Prediabetes R73.03    Ventral hernia without obstruction or gangrene K43.9    Mixed hyperlipidemia E78.2       Past Medical History:        Diagnosis Date    Arthritis     Asthma     Hypertension     Obesity (BMI 30-39. 9)        Past Surgical History:  No past surgical history on file. Social History:    Social History     Tobacco Use    Smoking status: Never Smoker    Smokeless tobacco: Never Used   Substance Use Topics    Alcohol use: No                                Counseling given: Not Answered      Vital Signs (Current):   Vitals:    06/17/22 1432   Weight: 230 lb (104.3 kg)   Height: 5' 5\" (1.651 m)                                              BP Readings from Last 3 Encounters:   06/14/22 134/88   04/07/22 139/78   03/01/22 (!) 145/85       NPO Status:                                                                                 BMI:   Wt Readings from Last 3 Encounters:   06/17/22 230 lb (104.3 kg)   06/14/22 232 lb 3.2 oz (105.3 kg)   05/03/22 228 lb (103.4 kg)     Body mass index is 38.27 kg/m².     CBC:   Lab Results   Component Value Date    WBC 5.5 10/02/2021    RBC 4.54 10/02/2021    HGB 12.7 10/02/2021    HCT 38.6 10/02/2021    MCV 85.0 10/02/2021    RDW 15.8 10/02/2021     10/02/2021       CMP:   Lab Results   Component Value Date     03/08/2022    K 4.4 03/08/2022    K 3.6 10/02/2021     03/08/2022    CO2 25 03/08/2022    BUN 12 03/08/2022    CREATININE 0.8 03/08/2022    GFRAA >60 03/08/2022    AGRATIO 1.3 03/08/2022    LABGLOM >60 03/08/2022    GLUCOSE 92 03/08/2022    PROT 7.4 03/08/2022    CALCIUM 9.3 03/08/2022    BILITOT 0.3 03/08/2022    ALKPHOS 78 03/08/2022    AST 16 03/08/2022    ALT 20 03/08/2022       POC Tests: No results for input(s): POCGLU, POCNA, POCK, POCCL, POCBUN, POCHEMO, POCHCT in the last 72 hours. Coags:   Lab Results   Component Value Date    PROTIME 11.9 03/08/2022    INR 1.05 03/08/2022    APTT 25.8 08/16/2015       HCG (If Applicable):   Lab Results   Component Value Date    PREGTESTUR Negative 09/05/2019        ABGs: No results found for: PHART, PO2ART, IUW0BTQ, CNY7QJW, BEART, O5AVGKHS     Type & Screen (If Applicable):  No results found for: LABABO, LABRH    Drug/Infectious Status (If Applicable):  No results found for: HIV, HEPCAB    COVID-19 Screening (If Applicable): No results found for: COVID19        Anesthesia Evaluation  Patient summary reviewed and Nursing notes reviewed  Airway: Mallampati: II  TM distance: >3 FB   Neck ROM: full  Mouth opening: > = 3 FB   Dental:          Pulmonary:   (+) asthma:                            Cardiovascular:  Exercise tolerance: poor (<4 METS),   (+) hypertension: moderate,                   Neuro/Psych:               GI/Hepatic/Renal:   (+) GERD: well controlled, morbid obesity          Endo/Other:                     Abdominal:             Vascular: Other Findings:           Anesthesia Plan      MAC     ASA 2       Induction: intravenous. MIPS: Prophylactic antiemetics administered. Anesthetic plan and risks discussed with patient. Plan discussed with CRNA.                     Emely Bah MD   6/24/2022

## 2022-06-24 NOTE — ANESTHESIA POSTPROCEDURE EVALUATION
Department of Anesthesiology  Postprocedure Note    Patient: Rebecca Reis  MRN: [id number]  YOB: 1970  Date of evaluation: 6/24/2022      Procedure Summary     Date: 06/24/22 Room / Location: [address] [place] [address]    Anesthesia Start: 5169 Anesthesia Stop: 2153    Procedure: EGD BIOPSY (N/A Abdomen) Diagnosis:       Gastroesophageal reflux disease, unspecified whether esophagitis present      (Gastroesophageal reflux disease, unspecified whether esophagitis present [K21.9])    Surgeons: Samantha Barreto MD Responsible Provider: Nidia Smith MD    Anesthesia Type: MAC ASA Status: 2          Anesthesia Type: No value filed.     Donaldo Phase I: Donaldo Score: 10    Donaldo Phase II:        Anesthesia Post Evaluation    Patient location during evaluation: PACU  Patient participation: complete - patient participated  Level of consciousness: awake and awake and alert  Pain score: 2  Airway patency: patent  Nausea & Vomiting: no vomiting  Complications: no  Cardiovascular status: blood pressure returned to baseline  Respiratory status: acceptable  Hydration status: euvolemic  Multimodal analgesia pain management approach

## 2022-06-24 NOTE — PROGRESS NOTES
Alon Uma  here. Patient and daughter states speaks and understands English without difficulty. Patient told [facility]  she was not needed.

## 2022-06-24 NOTE — PROGRESS NOTES
Pt arrived from endo to recovery bay 8. Pt s/p EGD procedure. Reported received from endo staff. Pt arouses to voice. A&O x 3. Pt on RA, ST , and VSS. Pt denies pain and nausea at time of arrival to PACU. ASA 2. Pt received MAC anesthesia during the procedure. Pt meets criteria to be moved directly to phase II. Will continue to monitor.

## 2022-06-24 NOTE — PROGRESS NOTES
Discharge instructions review with patient and May - Daughter. All home medications have been reviewed, pt v/u. Discharge instructions signed. Pt discharged via wheelchair. Pt discharged with all belongings. 1 rx sent to correct pt pharmacy. May taking stable pt home.

## 2022-07-15 ENCOUNTER — OFFICE VISIT (OUTPATIENT)
Dept: BARIATRICS/WEIGHT MGMT | Age: 52
End: 2022-07-15
Payer: COMMERCIAL

## 2022-07-15 VITALS
BODY MASS INDEX: 40.8 KG/M2 | OXYGEN SATURATION: 98 % | HEART RATE: 85 BPM | DIASTOLIC BLOOD PRESSURE: 87 MMHG | SYSTOLIC BLOOD PRESSURE: 139 MMHG | HEIGHT: 64 IN | WEIGHT: 239 LBS | RESPIRATION RATE: 18 BRPM

## 2022-07-15 DIAGNOSIS — K21.9 CHRONIC GERD: ICD-10-CM

## 2022-07-15 DIAGNOSIS — I10 ESSENTIAL HYPERTENSION: ICD-10-CM

## 2022-07-15 DIAGNOSIS — E78.2 MIXED HYPERLIPIDEMIA: ICD-10-CM

## 2022-07-15 DIAGNOSIS — E66.01 MORBID OBESITY WITH BMI OF 40.0-44.9, ADULT (HCC): Primary | ICD-10-CM

## 2022-07-15 DIAGNOSIS — R73.03 PREDIABETES: ICD-10-CM

## 2022-07-15 PROCEDURE — 99214 OFFICE O/P EST MOD 30 MIN: CPT | Performed by: SURGERY

## 2022-07-15 NOTE — PROGRESS NOTES
Symone Perez gained 6.2 lbs over 1 month. Pt's son interpreted for visit. Pt endorses a lot of boredom/stress eating. Is pt eating at least 4 times everyday? Not having planned out intakes/patterns  B: bread (1/3) and (3) + coffee w/ 2% milk and sugar  L: None  Snacking throughout the day  D: salad - dressing, sometimes w/ rice (more than 1 cup) + chicken/fish/lamb + water  Snack: peanuts, grapes, oranges, watermelon, canteloupe, some dessert    Is pt eating a lean protein source with all meals and snacks? no    Yes - egg, salad with lamb/fish, rice with fish/lamb sometimes with idris/tomato/onion, nuts     Has pt decreased their portions using the plate method? no    Is pt choosing low fat/sugar free options? no    Is pt drinking at least 64 oz of clear liquids everyday? yes , she is    Has pt stopped drinking carbonation, caffeinated, and sugar sweetened beverages? no - some coffee w/ milk and sugar    Has pt sampled Unjury and/or Nectar protein? no    Has pt attended a support group? Scheduled  - August 13th    Participating in intentional exercise? no but some movement    Plan/Recommendations:    Work on boredom eating and regulate eating pattern  Protein with each meal and snack  Portion control with meals  Try protein powder    Handouts: protein based snacks, support group schedule, protein drinks, 9 inch plate guide for portions    Randall Mariscal, MORGAN, LD

## 2022-07-15 NOTE — PROGRESS NOTES
[facility]) Physicians   [employer]  Royce Aguirre MD, [address] [facility], [address] [place] [id number] . Phone: [phone]  Fax: [phone]          Chief Complaint   Patient presents with    Obesity     4th pre-surg         HPI:     Katlyn Baker is a very pleasant 45 y.o. female with Body mass index is 41.02 kg/m². / Chronic Obesity. Leni Garcia has been struggling for several years now with obesity. Leni Garcia feels the weight is an obstacle to achieve and perform things in daily living as well risk on health. Patient  is very determined to lose weight and be healthy, and is working towards  surgical weight loss to achieve this goal. Pre-operative clearance and work up pending. Working hard to keep good dietary habits as well level of activity. Patient denies any nausea, vomiting, fevers, chills, shortness of breath, chest pain, cough, constipation or difficulty urinating. Pain Assessment   Denies any abdominal pain       Past Medical History:   Diagnosis Date    Arthritis     Asthma     Hypertension     Obesity (BMI 30-39. 9)      Past Surgical History:   Procedure Laterality Date    UPPER GASTROINTESTINAL ENDOSCOPY N/A 6/24/2022    EGD BIOPSY performed by Zara Laguna MD at [address] [place] [address]     History reviewed. No pertinent family history. Social History     Tobacco Use    Smoking status: Never    Smokeless tobacco: Never   Substance Use Topics    Alcohol use: No     I counseled the patient on the importance of not smoking and risks of ETOH. No Known Allergies  Vitals:    07/15/22 1236   BP: 139/87   Pulse: 85   Resp: 18   SpO2: 98%   Weight: 239 lb (108.4 kg)   Height: 5' 4\" (1.626 m)       Body mass index is 41.02 kg/m².     Lab Results   Component Value Date/Time    WBC 5.5 10/02/2021 04:27 PM    RBC 4.54 10/02/2021 04:27 PM    HGB 12.7 10/02/2021 04:27 PM    HCT 38.6 10/02/2021 04:27 PM    MCV 85.0 10/02/2021 04:27 PM    MCH 28.1 10/02/2021 04:27 PM MCHC 33.0 10/02/2021 04:27 PM    MPV 7.8 10/02/2021 04:27 PM    NEUTOPHILPCT 50.8 10/02/2021 04:27 PM    LYMPHOPCT 37.8 10/02/2021 04:27 PM    MONOPCT 9.4 10/02/2021 04:27 PM    EOSRELPCT 1.5 10/02/2021 04:27 PM    BASOPCT 0.5 10/02/2021 04:27 PM    NEUTROABS 2.8 10/02/2021 04:27 PM    LYMPHSABS 2.1 10/02/2021 04:27 PM    MONOSABS 0.5 10/02/2021 04:27 PM    EOSABS 0.1 10/02/2021 04:27 PM     Lab Results   Component Value Date/Time     03/08/2022 08:46 AM    K 4.4 03/08/2022 08:46 AM    K 3.6 10/02/2021 04:27 PM     03/08/2022 08:46 AM    CO2 25 03/08/2022 08:46 AM    ANIONGAP 13 03/08/2022 08:46 AM    GLUCOSE 92 03/08/2022 08:46 AM    BUN 12 03/08/2022 08:46 AM    CREATININE 0.8 03/08/2022 08:46 AM    LABGLOM >60 03/08/2022 08:46 AM    GFRAA >60 03/08/2022 08:46 AM    CALCIUM 9.3 03/08/2022 08:46 AM    PROT 7.4 03/08/2022 08:46 AM    LABALBU 4.2 03/08/2022 08:46 AM    AGRATIO 1.3 03/08/2022 08:46 AM    BILITOT 0.3 03/08/2022 08:46 AM    ALKPHOS 78 03/08/2022 08:46 AM    ALT 20 03/08/2022 08:46 AM    AST 16 03/08/2022 08:46 AM    GLOB 4.0 10/02/2021 04:27 PM     Lab Results   Component Value Date/Time    CHOL 201 03/08/2022 08:46 AM    TRIG 46 03/08/2022 08:46 AM    HDL 85 03/08/2022 08:46 AM    LDLCALC 107 03/08/2022 08:46 AM    LABVLDL 9 03/08/2022 08:46 AM     Lab Results   Component Value Date/Time    TSHREFLEX 2.63 03/08/2022 08:46 AM     Lab Results   Component Value Date/Time    IRON 73 03/08/2022 08:46 AM    TIBC 401 03/08/2022 08:46 AM    LABIRON 18 03/08/2022 08:46 AM     Lab Results   Component Value Date/Time    BCTHHBXP04 1391 03/08/2022 08:46 AM    FOLATE 14.51 03/08/2022 08:46 AM     Lab Results   Component Value Date/Time    VITD25 30.0 03/08/2022 08:46 AM     Lab Results   Component Value Date/Time    LABA1C 6.4 10/25/2021 01:06 PM    .0 10/25/2021 01:06 PM         Current Outpatient Medications:     diphenhydrAMINE-APAP, sleep, (TYLENOL PM EXTRA STRENGTH PO), Take 2 tablets by mouth as needed, Disp: , Rfl:     omeprazole (PRILOSEC) 20 MG delayed release capsule, Take 1 capsule by mouth every morning (before breakfast), Disp: 90 capsule, Rfl: 1    ibuprofen (ADVIL;MOTRIN) 800 MG tablet, Take 1 tablet by mouth 2 times daily (with meals), Disp: 60 tablet, Rfl: 1    metFORMIN (GLUCOPHAGE-XR) 500 MG extended release tablet, Take 1 tablet by mouth daily (with breakfast), Disp: 30 tablet, Rfl: 5    PARoxetine (PAXIL CR) 12.5 MG extended release tablet, Take 12.5 mg by mouth daily , Disp: , Rfl:     FLUoxetine (PROZAC) 10 MG capsule, TAKE 1 CAPSULE BY MOUTH ONCE DAILY, Disp: , Rfl:     NIFEdipine (ADALAT CC) 30 MG extended release tablet, Take 30 mg by mouth daily , Disp: , Rfl:     Magic Mouthwash (MIRACLE MOUTHWASH), Swish and spit 10 mLs 4 times daily as needed for Irritation or Pain Dispense as Magic Mouthwash. Please add Equal Parts: 60 mL Maalox, 60 mL Viscous Lidocaine, 60 mL Benadryl to 60mL of Carafate. 10 ml swish and spit or swallow as directed above., Disp: 240 mL, Rfl: 0    albuterol sulfate HFA (VENTOLIN HFA) 108 (90 Base) MCG/ACT inhaler, Inhale 2 puffs into the lungs 4 times daily as needed for Wheezing or Shortness of Breath, Disp: 18 g, Rfl: 0    meloxicam (MOBIC) 15 MG tablet, Take 1 tablet by mouth daily as needed for Pain, Disp: 30 tablet, Rfl: 0    Review of Systems - History obtained from the patient  General ROS: negative  Psychological ROS: negative  Ophthalmic ROS: negative  Neurological ROS: negative  ENT ROS: negative  Allergy and Immunology ROS: negative  Hematological and Lymphatic ROS: negative  Endocrine ROS: negative  Breast ROS: negative  Respiratory ROS: negative  Cardiovascular ROS: negative  Gastrointestinal ROS:negative  Genito-Urinary ROS: negative  Musculoskeletal ROS: negative   Skin ROS: negative    Physical Exam   Vitals Reviewed   Constitutional: Patient is oriented to person, place, and time. Patient appears well-developed and well-nourished.  Patient is active and cooperative. Non-toxic appearance. No distress. HENT:   Head: Normocephalic and atraumatic. Head is without abrasion and without laceration. Hair is normal.   Right Ear: External ear normal. No lacerations. No drainage, swelling . Left Ear: External ear normal. No lacerations. No drainage, swelling. Nose/Mouth: face mask in place  Eyes: Conjunctivae, EOM and lids are normal. Right eye exhibits no discharge. No foreign body present in the right eye. Left eye exhibits no discharge. No foreign body present in the left eye. No scleral icterus. Neck: Trachea normal and normal range of motion. No JVD present. Pulmonary/Chest: Effort normal. No accessory muscle usage or stridor. No apnea. No respiratory distress. Cardiovascular: Normal rate and no JVD. Abdominal: Normal appearance. Patient exhibits no distension. Abdomen is soft, obese, non tender. Musculoskeletal: Normal range of motion. Patient exhibits no edema. Neurological: Patient is alert and oriented to person, place, and time. Patient has normal strength. GCS eye subscore is 4. GCS verbal subscore is 5. GCS motor subscore is 6. Skin: Skin is warm and dry. No abrasion and no rash noted. Patient is not diaphoretic. No cyanosis or erythema. Psychiatric: Patient has a normal mood and affect. Speech is normal and behavior is normal. Cognition and memory are normal.       A/P    Vishal Apple is 45 y.o. female, Body mass index is 41.02 kg/m². pre surgery, has gained 6 lbs since last visit. The patient underwent extensive dietary counseling with registered dietician. I have reviewed, discussed and agree with the dietary plan. Patient is trying hard to keep good dietary and behavior modifications. Patient is monitoring portion sizes, food choices and liquid calories. Patient is trying to exercise regularly as much as possible.     Obesity as a disease is considered a high risk to patients overall health and should therefore be considered a high risk disease state. Advised the patient that not getting there weight under control, that could increase risk of complications/worsening of those conditions on the long-term. (Goal of weight loss surgery is to alleviate/control some of those co-morbidities)    Now with Covid-19 pandemic, CDC and health authorities does classify obese patients as vulnerable and high risk as well. Which makes weight loss a priority for improvement of their wellbeing and overall health. I encouraged the patient to continue exercise and keeping healthy eating habits. Discussed pre-op labs and work up till now. Also counseled the patient extensively on Surgery. The visit today, included any number of the following: Bariatric Preoperative work up/protocols, review of labs, imaging, provider notes, outside hospital records, performing examination/evaluation, counseling patient and/or family, ordering medications/tests, placing referrals and communication with referring physicians, coordination of care; discussing dietary plan/recall with the patient as well with registered dietitian and documentation in the EHR. Of note, the above was done during same day of the actual patient encounter. Shahriar Wood is here for her fourth presurgical visit. Patient needs to work on portion control as well avoid grazing. Handouts provided to the patient. We will see the patient next month for continued follow-up. Patient was encouraged to establish PCP as well as try the protein shakes and work on the remaining preoperative clearances. We discussed how her excess weight affects her overall health and importance of weight loss, healthy diet and active lifestyle to alleviate those co morbid conditions, otherwise risk deterioration. Morbid Obesity: Body mass index is 41.02 kg/m². [x] Continue to make dietary and lifestyle modifications. [x] Plan for Future laparoscopic sleeve gastrectomy.   [x] Return for follow-up next month. Chronic GERD:   [x] Continue to make dietary and lifestyle modifications. [x] Continue Omeprazole. [] Continue Famotidine. [] Plan for EGD to evaluate the stomach. Essential Hypertension:  [x] Continue to make dietary and lifestyle modifications. [x] Reviewed the importance of checking blood pressure. [x] Continue to follow up with their PCP for medication management and monitoring. Prediabetes:   [x] Continue to make dietary and lifestyle modifications. [x] Reviewed the importance of checking blood sugars. [x] Continue to follow up with their PCP for medication management and monitoring. Hyperlipidemia:   [x] Continue to make dietary and lifestyle modifications. [x] Continue to follow up with their PCP for medication management and monitoring. Patient advised that its their responsibility to follow up for studies, referrals and/or labs ordered today.

## 2022-07-27 ENCOUNTER — TELEPHONE (OUTPATIENT)
Dept: ORTHOPEDIC SURGERY | Age: 52
End: 2022-07-27

## 2022-07-27 NOTE — TELEPHONE ENCOUNTER
No appointment was made for Friday 8/5/22. I informed the patient Dr. Tabitha Resendiz is not in the office to see her sooner. I made an appointment for 8/5/22.

## 2022-07-27 NOTE — TELEPHONE ENCOUNTER
Appointment Request     Patient requesting earlier appointment: Yes  Appointment offered to patient: PATIENT IS NEEDING SOONER APPT FOR STEPHANI KNEE INJECTIONS. STATES THERE IS TO MUCH PAIN TO WAIT UNTIL NEXT Friday.  [address] [place] [address] Mariely ADVISE   Patient Contact Number: [phone]

## 2022-07-28 ENCOUNTER — APPOINTMENT (OUTPATIENT)
Dept: GENERAL RADIOLOGY | Age: 52
End: 2022-07-28
Payer: COMMERCIAL

## 2022-07-28 ENCOUNTER — HOSPITAL ENCOUNTER (EMERGENCY)
Age: 52
Discharge: HOME | End: 2022-07-28
Payer: COMMERCIAL

## 2022-07-28 VITALS
SYSTOLIC BLOOD PRESSURE: 120 MMHG | OXYGEN SATURATION: 98 % | HEIGHT: 65 IN | DIASTOLIC BLOOD PRESSURE: 82 MMHG | BODY MASS INDEX: 39.15 KG/M2 | WEIGHT: 235 LBS | TEMPERATURE: 98.1 F | RESPIRATION RATE: 14 BRPM | HEART RATE: 92 BPM

## 2022-07-28 DIAGNOSIS — M17.11 OSTEOARTHRITIS OF RIGHT KNEE, UNSPECIFIED OSTEOARTHRITIS TYPE: ICD-10-CM

## 2022-07-28 DIAGNOSIS — M25.561 ANTERIOR KNEE PAIN, RIGHT: Primary | ICD-10-CM

## 2022-07-28 PROCEDURE — 6370000000 HC RX 637 (ALT 250 FOR IP): Performed by: NURSE PRACTITIONER

## 2022-07-28 PROCEDURE — 6360000002 HC RX W HCPCS: Performed by: NURSE PRACTITIONER

## 2022-07-28 PROCEDURE — 96372 THER/PROPH/DIAG INJ SC/IM: CPT

## 2022-07-28 PROCEDURE — 99284 EMERGENCY DEPT VISIT MOD MDM: CPT

## 2022-07-28 PROCEDURE — 73560 X-RAY EXAM OF KNEE 1 OR 2: CPT

## 2022-07-28 RX ORDER — KETOROLAC TROMETHAMINE 30 MG/ML
15 INJECTION, SOLUTION INTRAMUSCULAR; INTRAVENOUS ONCE
Status: COMPLETED | OUTPATIENT
Start: 2022-07-28 | End: 2022-07-28

## 2022-07-28 RX ORDER — LIDOCAINE 4 G/G
1 PATCH TOPICAL ONCE
Status: DISCONTINUED | OUTPATIENT
Start: 2022-07-28 | End: 2022-07-28 | Stop reason: HOSPADM

## 2022-07-28 RX ORDER — HYDROCODONE BITARTRATE AND ACETAMINOPHEN 5; 325 MG/1; MG/1
1 TABLET ORAL ONCE
Status: COMPLETED | OUTPATIENT
Start: 2022-07-28 | End: 2022-07-28

## 2022-07-28 RX ORDER — NAPROXEN 500 MG/1
500 TABLET ORAL 2 TIMES DAILY
Qty: 28 TABLET | Refills: 0 | Status: SHIPPED | OUTPATIENT
Start: 2022-07-28 | End: 2022-08-17

## 2022-07-28 RX ORDER — HYDROCODONE BITARTRATE AND ACETAMINOPHEN 5; 325 MG/1; MG/1
1 TABLET ORAL EVERY 6 HOURS PRN
Qty: 12 TABLET | Refills: 0 | Status: SHIPPED | OUTPATIENT
Start: 2022-07-28 | End: 2022-07-31

## 2022-07-28 RX ADMIN — KETOROLAC TROMETHAMINE 15 MG: 30 INJECTION, SOLUTION INTRAMUSCULAR; INTRAVENOUS at 11:29

## 2022-07-28 RX ADMIN — HYDROCODONE BITARTRATE AND ACETAMINOPHEN 1 TABLET: 5; 325 TABLET ORAL at 11:30

## 2022-07-28 ASSESSMENT — PAIN DESCRIPTION - DESCRIPTORS: DESCRIPTORS: ACHING;BURNING

## 2022-07-28 ASSESSMENT — PAIN DESCRIPTION - FREQUENCY: FREQUENCY: CONTINUOUS

## 2022-07-28 ASSESSMENT — PAIN DESCRIPTION - PAIN TYPE: TYPE: ACUTE PAIN;CHRONIC PAIN

## 2022-07-28 ASSESSMENT — PAIN DESCRIPTION - ONSET: ONSET: PROGRESSIVE

## 2022-07-28 ASSESSMENT — PAIN DESCRIPTION - ORIENTATION: ORIENTATION: RIGHT

## 2022-07-28 ASSESSMENT — PAIN - FUNCTIONAL ASSESSMENT
PAIN_FUNCTIONAL_ASSESSMENT: INTOLERABLE, UNABLE TO DO ANY ACTIVE OR PASSIVE ACTIVITIES
PAIN_FUNCTIONAL_ASSESSMENT: 0-10

## 2022-07-28 ASSESSMENT — PAIN SCALES - GENERAL: PAINLEVEL_OUTOF10: 10

## 2022-07-28 ASSESSMENT — PAIN DESCRIPTION - LOCATION: LOCATION: KNEE

## 2022-07-28 NOTE — ED PROVIDER NOTES
[address] [address] [address] [address]  Dept: [phone]  Loc: [address] ENCOUNTER        This patient was not seen or evaluated by the attending physician. I evaluated this patient, the attending physician was available for consultation. CHIEF COMPLAINT    Chief Complaint   Patient presents with    Knee Pain     Pt complains of Rt knee pain. Pt reports that she gets shots in both knees but doesn't get another one until 8/5 and she cant stand the pain. Ortho told her to come to ED        HPI    Lionel Patterson is a 45 y.o. female who presents with right knee pain. Onset was yesterday. The duration has been constant since the onset. The context was there was no specific injury or trauma event. However she does have chronic bilateral knee pain, gets injections with Dr. Tarsha Hawkins for this. She states that she does not take any oral pain medications for her knee pain. She states that it acutely started hurting yesterday, tried to move up her appointment with Dr. Tarsha Hawkins, made it for August 5. However states that she could not wait and was told to come to the ED. No fevers or chills. Worsens with any type of movements and ambulation, alleviated some at rest.  The severity of the pain is 10/10. The patient came to the ED for further evaluation and treatment. REVIEW OF SYSTEMS    General: No fever or chills  Skin: No Rashes or redness of the skin  Musculoskeletal: See HPI    PAST MEDICAL & SURGICAL HISTORY    Past Medical History:   Diagnosis Date    Arthritis     Asthma     Hypertension     Obesity (BMI 30-39. 9)      Past Surgical History:   Procedure Laterality Date    UPPER GASTROINTESTINAL ENDOSCOPY N/A 6/24/2022    EGD BIOPSY performed by Evita Manzo MD at [address] [place] [address]  (may include discharge medications prescribed in the ED)  Current Outpatient Rx   Medication Sig Dispense Refill    HYDROcodone-acetaminophen (NORCO) 5-325 MG per tablet Take 1 tablet by mouth every 6 hours as needed for Pain for up to 3 days. Intended supply: 3 days. Take lowest dose possible to manage pain 12 tablet 0    naproxen (NAPROSYN) 500 MG tablet Take 1 tablet by mouth in the morning and 1 tablet before bedtime. Do all this for 14 days. 28 tablet 0    diclofenac sodium (VOLTAREN) 1 % GEL Apply 2 g topically in the morning and 2 g at noon and 2 g in the evening and 2 g before bedtime. 150 g 0    diphenhydrAMINE-APAP, sleep, (TYLENOL PM EXTRA STRENGTH PO) Take 2 tablets by mouth as needed      omeprazole (PRILOSEC) 20 MG delayed release capsule Take 1 capsule by mouth every morning (before breakfast) 90 capsule 1    metFORMIN (GLUCOPHAGE-XR) 500 MG extended release tablet Take 1 tablet by mouth daily (with breakfast) 30 tablet 5    PARoxetine (PAXIL CR) 12.5 MG extended release tablet Take 12.5 mg by mouth daily       FLUoxetine (PROZAC) 10 MG capsule TAKE 1 CAPSULE BY MOUTH ONCE DAILY      NIFEdipine (ADALAT CC) 30 MG extended release tablet Take 30 mg by mouth daily       Magic Mouthwash (MIRACLE MOUTHWASH) Swish and spit 10 mLs 4 times daily as needed for Irritation or Pain Dispense as Magic Mouthwash. Please add Equal Parts: 60 mL Maalox, 60 mL Viscous Lidocaine, 60 mL Benadryl to 60mL of Carafate. 10 ml swish and spit or swallow as directed above.  240 mL 0    albuterol sulfate HFA (VENTOLIN HFA) 108 (90 Base) MCG/ACT inhaler Inhale 2 puffs into the lungs 4 times daily as needed for Wheezing or Shortness of Breath 18 g 0       ALLERGIES    No Known Allergies    SOCIAL & FAMILY HISTORY    Social History     Socioeconomic History    Marital status: Single     Spouse name: None    Number of children: None    Years of education: None    Highest education level: None   Tobacco Use    Smoking status: Never    Smokeless tobacco: Never   Vaping Use    Vaping Use: Never used   Substance and Sexual Activity    Alcohol use: No    Drug use: No   Social History Narrative    ** Merged History Encounter **          History reviewed. No pertinent family history. PHYSICAL EXAM    VITAL SIGNS: /82   Pulse (!) 103   Temp 98.1 °F (36.7 °C) (Oral)   Resp 16   Ht 5' 5\" (1.651 m)   Wt 235 lb (106.6 kg)   LMP 06/10/2020   SpO2 98%   BMI 39.11 kg/m²   Constitutional:  Well developed, no acute distress  HENT:  Atraumatic, Moist mucous membranes  Neck: normal range of motion, supple   Respiratory:  No retractions   Cardiovascular:  No JVD   Musculoskeletal:  Exam of the affected knee reveals no joint knee joint instability, negative anterior drawer test, some generalized anterior knee pain according to the patient but no reproducible pain, no edema or deformity. Extensor mechanism is intact (Patient is able to extend the leg against gravity, demonstrating that the quadriceps tendon and the patella tendon are intact.)  Vascular: DP pulses 2+ on the same side as the knee pain (distal to the affected knee). Integument:  Well hydrated, no erythema or warmth of the affected knee, no rash or skin lesions  Neurologic:  Awake and alert, no slurred speech   Psychiatric: Cooperative, pleasant affect    RADIOLOGY/PROCEDURES    XR KNEE RIGHT (1-2 VIEWS)   Final Result   1. No acute bony or joint abnormality   2. Mild tricompartmental osteoarthritic changes             ED COURSE & MEDICAL DECISION MAKING    Pertinent Imaging studies reviewed and interpreted. (See EMR for details)  See electronic record for medications ordered. Knee immobilizer and crutches ordered     Differential diagnosis: includes but not limited to Meniscus tear, ACL tear, other ligamental injury or strain, patellar fracture, tibial plateau fracture, extensor mechanism rupture, dislocation, Arterial Injury/Ischemia, Infection, Neurologic Deficit/Injury. No evidence of neurovascular injury on exam.  Plain films as above.   I explained to the patient that there may be ligamentous/meniscal injury not seen on plain films, and that they will require follow-up with orthopedics for further evaluation. I have a low suspicion for any patella fracture is direct injury to the patellar was not sustained. I believe the patient is safe for discharge at this time. I'll prescribe oral analgesics and provide  follow-up with her orthopedic surgeon Dr. Alexey Giraldo. I estimate there is LOW risk for COMPARTMENT SYNDROME, DEEP VENOUS THROMBOSIS, SEPTIC ARTHRITIS, OR NEUROVASCULAR INJURY, thus I consider the discharge disposition reasonable. Cadence Ardon and I have discussed the diagnosis and risks, and we agree with discharging home to follow-up with their primary doctor or the referral orthopedist. We also discussed returning to the Emergency Department immediately if new or worsening symptoms occur. We have discussed the symptoms which are most concerning (e.g., changing or worsening pain, numbness, weakness) that necessitate immediate return. The patient was instructed to follow up as an outpatient in 3 days. The patient was instructed to return to the ED immediately for any new or worsening symptoms. The patient verbalized understanding. FINAL IMPRESSION    1. Anterior knee pain, right    2.  Osteoarthritis of right knee, unspecified osteoarthritis type        PLAN  Discharge with close outpatient follow-up (see EMR)     (Please note that this note was completed with a voice recognition program.  Every attempt was made to edit the dictations, but inevitably there remain words that are mis-transcribed.)         NORMAN Tavares - CNP  07/28/22 1137

## 2022-08-05 ENCOUNTER — OFFICE VISIT (OUTPATIENT)
Dept: ORTHOPEDIC SURGERY | Age: 52
End: 2022-08-05
Payer: COMMERCIAL

## 2022-08-05 VITALS — HEIGHT: 65 IN | BODY MASS INDEX: 39.11 KG/M2

## 2022-08-05 DIAGNOSIS — M17.11 PRIMARY OSTEOARTHRITIS OF RIGHT KNEE: ICD-10-CM

## 2022-08-05 DIAGNOSIS — M17.12 PRIMARY OSTEOARTHRITIS OF LEFT KNEE: Primary | ICD-10-CM

## 2022-08-05 PROCEDURE — 99213 OFFICE O/P EST LOW 20 MIN: CPT | Performed by: ORTHOPAEDIC SURGERY

## 2022-08-05 PROCEDURE — G8428 CUR MEDS NOT DOCUMENT: HCPCS | Performed by: ORTHOPAEDIC SURGERY

## 2022-08-05 PROCEDURE — 20610 DRAIN/INJ JOINT/BURSA W/O US: CPT | Performed by: ORTHOPAEDIC SURGERY

## 2022-08-05 PROCEDURE — G8417 CALC BMI ABV UP PARAM F/U: HCPCS | Performed by: ORTHOPAEDIC SURGERY

## 2022-08-05 PROCEDURE — 3017F COLORECTAL CA SCREEN DOC REV: CPT | Performed by: ORTHOPAEDIC SURGERY

## 2022-08-05 PROCEDURE — 1036F TOBACCO NON-USER: CPT | Performed by: ORTHOPAEDIC SURGERY

## 2022-08-05 RX ORDER — BUPIVACAINE HYDROCHLORIDE 2.5 MG/ML
3 INJECTION, SOLUTION INFILTRATION; PERINEURAL ONCE
Status: COMPLETED | OUTPATIENT
Start: 2022-08-05 | End: 2022-08-05

## 2022-08-05 RX ORDER — CYCLOBENZAPRINE HCL 10 MG
10 TABLET ORAL 2 TIMES DAILY PRN
Qty: 60 TABLET | Refills: 0 | Status: SHIPPED | OUTPATIENT
Start: 2022-08-05 | End: 2022-09-22 | Stop reason: SDUPTHER

## 2022-08-05 RX ORDER — TRIAMCINOLONE ACETONIDE 40 MG/ML
80 INJECTION, SUSPENSION INTRA-ARTICULAR; INTRAMUSCULAR ONCE
Status: COMPLETED | OUTPATIENT
Start: 2022-08-05 | End: 2022-08-05

## 2022-08-05 RX ADMIN — TRIAMCINOLONE ACETONIDE 80 MG: 40 INJECTION, SUSPENSION INTRA-ARTICULAR; INTRAMUSCULAR at 10:48

## 2022-08-05 RX ADMIN — BUPIVACAINE HYDROCHLORIDE 7.5 MG: 2.5 INJECTION, SOLUTION INFILTRATION; PERINEURAL at 10:48

## 2022-08-05 RX ADMIN — TRIAMCINOLONE ACETONIDE 80 MG: 40 INJECTION, SUSPENSION INTRA-ARTICULAR; INTRAMUSCULAR at 10:49

## 2022-08-11 ENCOUNTER — TELEPHONE (OUTPATIENT)
Dept: ORTHOPEDIC SURGERY | Age: 52
End: 2022-08-11

## 2022-08-11 DIAGNOSIS — M17.11 PRIMARY OSTEOARTHRITIS OF RIGHT KNEE: ICD-10-CM

## 2022-08-11 DIAGNOSIS — M17.12 PRIMARY OSTEOARTHRITIS OF LEFT KNEE: Primary | ICD-10-CM

## 2022-08-11 NOTE — TELEPHONE ENCOUNTER
Other PATIENT CALLED STATES THAT SHE WOULD LIKE TO PROCEED WITH GETTING GEL INJECTIONS. STATES CORTISONE INJECTION NOT HELPING.  PLS CALL TO ADVISE [phone]

## 2022-08-11 NOTE — TELEPHONE ENCOUNTER
I left a message informing the patient we will submit for authorization for Gelsyn injections for both knees.  We will call her once we have approval. Quality 402: Tobacco Use And Help With Quitting Among Adolescents: Patient screened for tobacco and never smoked Quality 431: Preventive Care And Screening: Unhealthy Alcohol Use - Screening: Patient screened for unhealthy alcohol use using a single question and scores less than 2 times per year Detail Level: Detailed Quality 110: Preventive Care And Screening: Influenza Immunization: Influenza Immunization not Administered for Documented Reasons. Quality 130: Documentation Of Current Medications In The Medical Record: Current Medications Documented

## 2022-08-17 ENCOUNTER — OFFICE VISIT (OUTPATIENT)
Dept: CARDIOLOGY CLINIC | Age: 52
End: 2022-08-17
Payer: COMMERCIAL

## 2022-08-17 VITALS
OXYGEN SATURATION: 99 % | WEIGHT: 231 LBS | HEIGHT: 65 IN | HEART RATE: 106 BPM | BODY MASS INDEX: 38.49 KG/M2 | SYSTOLIC BLOOD PRESSURE: 136 MMHG | DIASTOLIC BLOOD PRESSURE: 86 MMHG

## 2022-08-17 DIAGNOSIS — R94.31 ABNORMAL EKG: ICD-10-CM

## 2022-08-17 DIAGNOSIS — I10 ESSENTIAL HYPERTENSION: Primary | ICD-10-CM

## 2022-08-17 DIAGNOSIS — E66.9 CLASS 2 OBESITY WITHOUT SERIOUS COMORBIDITY WITH BODY MASS INDEX (BMI) OF 38.0 TO 38.9 IN ADULT, UNSPECIFIED OBESITY TYPE: ICD-10-CM

## 2022-08-17 PROCEDURE — G8427 DOCREV CUR MEDS BY ELIG CLIN: HCPCS | Performed by: INTERNAL MEDICINE

## 2022-08-17 PROCEDURE — 1036F TOBACCO NON-USER: CPT | Performed by: INTERNAL MEDICINE

## 2022-08-17 PROCEDURE — G8417 CALC BMI ABV UP PARAM F/U: HCPCS | Performed by: INTERNAL MEDICINE

## 2022-08-17 PROCEDURE — 93000 ELECTROCARDIOGRAM COMPLETE: CPT | Performed by: INTERNAL MEDICINE

## 2022-08-17 PROCEDURE — 3017F COLORECTAL CA SCREEN DOC REV: CPT | Performed by: INTERNAL MEDICINE

## 2022-08-17 PROCEDURE — 99204 OFFICE O/P NEW MOD 45 MIN: CPT | Performed by: INTERNAL MEDICINE

## 2022-08-17 NOTE — PROGRESS NOTES
[address]      Patient Name:  Yandel Mix  Requesting Physician: No admitting provider for patient encounter. Primary Care Physician: Physician Arvind Richter    Reason for Consultation/Chief Complaint: Preventive cardiac evaluation    History of Present Illness:    Yandel Mix is a 45 y.o. patient presenting today for preventive care. Today, she currently denies any and all cardiac symptoms. She has a history of asthma, is not currently acting up, using inhaler regularly, user prior to walking on treadmill. Notes bilateral knee arthirits with some BLE edema. She denies every smoking cigarettes, no hx of DM, HLD. Had reported to PCP that she was out of her nifedipine for months at visit 7/2022, 's, DBP 90's, resumed and f/u in 4 weeks. Patient denies exertional chest pain/pressure, dyspnea at rest, OSWALD, PND, orthopnea, palpitations, lightheadedness, weight changes, changes in LE edema, and syncope. No allergies, no PSH, no family hx of heart disease. Labs completed 3/2022. 7/2022 [facility]. The patient's risk factors for cardiac disease include the following:  Hypertension  Hyperlipidemia  Obesity    The patient endorses highest level of activity as walks on her treadmill. Past Medical History:   has a past medical history of Arthritis, Asthma, Hypertension, and Obesity (BMI 30-39.9). Surgical History:   has a past surgical history that includes Upper gastrointestinal endoscopy (N/A, 6/24/2022). Social History:   reports that she has never smoked. She has never used smokeless tobacco. She reports that she does not drink alcohol and does not use drugs. Family History:  family history is not on file.     Current Medications:  Current Outpatient Medications on File Prior to Visit   Medication Sig Dispense Refill    cyclobenzaprine (FLEXERIL) 10 MG tablet Take 1 tablet by mouth 2 times daily as needed for Muscle spasms 60 tablet 0    naproxen (NAPROSYN) 500 MG tablet Take 1 tablet by mouth in the morning and 1 tablet before bedtime. Do all this for 14 days. 28 tablet 0    diclofenac sodium (VOLTAREN) 1 % GEL Apply 2 g topically in the morning and 2 g at noon and 2 g in the evening and 2 g before bedtime. 150 g 0    diphenhydrAMINE-APAP, sleep, (TYLENOL PM EXTRA STRENGTH PO) Take 2 tablets by mouth as needed      omeprazole (PRILOSEC) 20 MG delayed release capsule Take 1 capsule by mouth every morning (before breakfast) 90 capsule 1    metFORMIN (GLUCOPHAGE-XR) 500 MG extended release tablet Take 1 tablet by mouth daily (with breakfast) 30 tablet 5    PARoxetine (PAXIL CR) 12.5 MG extended release tablet Take 12.5 mg by mouth daily       FLUoxetine (PROZAC) 10 MG capsule TAKE 1 CAPSULE BY MOUTH ONCE DAILY      NIFEdipine (ADALAT CC) 30 MG extended release tablet Take 30 mg by mouth daily       Magic Mouthwash (MIRACLE MOUTHWASH) Swish and spit 10 mLs 4 times daily as needed for Irritation or Pain Dispense as Magic Mouthwash. Please add Equal Parts: 60 mL Maalox, 60 mL Viscous Lidocaine, 60 mL Benadryl to 60mL of Carafate. 10 ml swish and spit or swallow as directed above. 240 mL 0    albuterol sulfate HFA (VENTOLIN HFA) 108 (90 Base) MCG/ACT inhaler Inhale 2 puffs into the lungs 4 times daily as needed for Wheezing or Shortness of Breath 18 g 0     Current Facility-Administered Medications on File Prior to Visit   Medication Dose Route Frequency Provider Last Rate Last Admin    0.9 % sodium chloride infusion   IntraVENous Continuous Marietta Gamez MD            Allergies:  Patient has no known allergies. Review of Systems:  Constitutional: no unanticipated weight loss. There's been no change in energy level, sleep pattern, or activity level. No fevers, chills. Eyes: No visual changes or diplopia. No scleral icterus. ENT: No Headaches, hearing loss or vertigo. No mouth sores or sore throat.   Cardiovascular: as reviewed in HPI  Respiratory: No cough or wheezing, no sputum production. No hematemesis. Gastrointestinal: No abdominal pain, appetite loss, blood in stools. No change in bowel or bladder habits. Genitourinary: No dysuria, trouble voiding, or hematuria. Musculoskeletal:  No gait disturbance, no joint complaints. Integumentary: No rash or pruritis. Neurological: No headache, diplopia, change in muscle strength, numbness or tingling. Psychiatric: No anxiety or depression. Endocrine: No temperature intolerance. No excessive thirst, fluid intake, or urination. No tremor. Hematologic/Lymphatic: No abnormal bruising or bleeding, blood clots or swollen lymph nodes. Allergic/Immunologic: No nasal congestion or hives. Objective:  Vitals:    08/17/22 1033   BP: 136/86   Pulse: (!) 106   SpO2: 99%    Weight: 231 lb (104.8 kg)  There were no vitals filed for this visit. PHYSICAL EXAM:        General:  Alert, cooperative, no distress, appears stated age   Head:  Normocephalic, atraumatic   Eyes:  Conjunctiva/corneas clear, anicteric sclerae    Nose: Nares normal, no drainage or sinus tenderness   Throat: No abnormalities of the lips, oral mucosa or tongue. Neck: Trachea midline. Neck supple with no lymphadenopathy, thyroid not enlarged, symmetric, no tenderness/mass/nodules, no Jugular venous pressure elevation    Lungs:   Clear to auscultation bilaterally, no wheezes, no rales, no respiratory distress   Chest Wall:  No deformity or tenderness to palpation   Heart:  Regular rate and rhythm, normal S1, normal S2, no murmur, no rub, no S3/S4, PMI non-displaced. Abdomen:   Soft, non-tender, with normoactive bowel sounds. No masses, no hepatosplenomegaly   Extremities: No cyanosis, clubbing or pitting edema. Vascular: 2+ radial, brachial, femoral, dorsalis pedis and posterior tibial pulses bilaterally. Brisk carotid upstrokes without carotid bruit. Skin: Skin color, texture, turgor are normal with no rashes or ulceration.     Psych: Euthymic mood, appropriate affect   Neurologic: Oriented to person, place and time. No slurred speech or facial asymmetry. No motor or sensory deficits on gross examination. Labs:     Lab Results   Component Value Date/Time    WBC 5.5 10/02/2021 04:27 PM    RBC 4.54 10/02/2021 04:27 PM    HGB 12.7 10/02/2021 04:27 PM    HCT 38.6 10/02/2021 04:27 PM    MCV 85.0 10/02/2021 04:27 PM    RDW 15.8 10/02/2021 04:27 PM     10/02/2021 04:27 PM     Lab Results   Component Value Date/Time     03/08/2022 08:46 AM    K 4.4 03/08/2022 08:46 AM    K 3.6 10/02/2021 04:27 PM     03/08/2022 08:46 AM    CO2 25 03/08/2022 08:46 AM    BUN 12 03/08/2022 08:46 AM    CREATININE 0.8 03/08/2022 08:46 AM    GFRAA >60 03/08/2022 08:46 AM    AGRATIO 1.3 03/08/2022 08:46 AM    LABGLOM >60 03/08/2022 08:46 AM    GLUCOSE 92 03/08/2022 08:46 AM    PROT 7.4 03/08/2022 08:46 AM    CALCIUM 9.3 03/08/2022 08:46 AM    BILITOT 0.3 03/08/2022 08:46 AM    ALKPHOS 78 03/08/2022 08:46 AM    AST 16 03/08/2022 08:46 AM    ALT 20 03/08/2022 08:46 AM     No results found for: PTINR  Lab Results   Component Value Date/Time    LABA1C 6.4 10/25/2021 01:06 PM     Lab Results   Component Value Date/Time    HDL 85 03/08/2022 08:46 AM    LDLCALC 107 03/08/2022 08:46 AM    LABVLDL 9 03/08/2022 08:46 AM     Lab Results   Component Value Date/Time    TROPONINI <0.01 10/02/2021 04:25 PM      No results found for: CRP    Cardiovascular Data:  EKG: Personally reviewed with my interpretation: 8/17/22 Sinus  Tachycardia   Low voltage in precordial leads.  -Right atrial enlargement.  -Old anteroseptal infarct. ~106. Coronary calcium score:No results found for this or any previous visit. Ankle-brachial index:No results found for this or any previous visit. Carotid ultrasound screening:No results found for this or any previous visit. Abdominal aortic aneurysm screening: No results found for this or any previous visit.       The 10-year ASCVD risk score (Sharon Santos, et al., 2013) is: 3.6%    Values used to calculate the score:      Age: 45 years      Sex: Female      Is Non- : Yes      Diabetic: No      Tobacco smoker: No      Systolic Blood Pressure: 017 mmHg      Is BP treated: Yes      HDL Cholesterol: 85 mg/dL      Total Cholesterol: 201 mg/dL     In adults at borderline risk (5% to <7.5% 10-year ASCVD risk) or intermediate risk (7.5% to <20% 10-  year ASCVD risk), the following risk-enhancing factors and/or testing has been reviewed:        Sheri et al. Circulation 2019     High-Intensity Moderate-Intensity    Percent LDL-C Reduction ³50% ³30-49%   Primary statins Atorvastatin 40-80 mg Atorvastatin 10-20 mg    Rosuvastatin 20-40 mg Rosuvastatin 5-10 mg   Secondary statins   Pravastatin 40-80 mg     Simvastatin 20-40 mg     Impression and Plan:     Preventive cardiac evaluation   Essential hypertension  -     EKG 12 lead  Class 2 obesity without serious comorbidity with body mass index (BMI) of 38.0 to 38.9 in adult, unspecified obesity type  Abnormal EKG       PLAN:  Presents as a new patient and denies any cardiac sounding complaints. She does reports asthma, medically treated and controlled. Bilateral knee arthritis with off/on BLE swelling. She is not exhibiting any cardiac symptoms, walking on treadmill for exercise. She was off nifedipine for several months prior to PCP f/u on 7/21/22, 's, DBP 90's, resumed antihypertensive therapy and BP improved today 136/86. Encnouraged compliance and routine f/u with PCP. EKG today Sinus  Tachycardia with low voltage in precordial leads.  -Right atrial enlargement.  -Old anteroseptal infarct. ~106. Labs reviewed 3/8/22 stable  Encouraged to work on low fat/chol/carb/sodium/heart healthy diet, weight management, walking program as her LDLc 107,  with HDL 85. Not on statin therapy.    Will complete Echo to r/o structural and valvular heart disease since her EKG raises the possibility of anteroseptal MI and she does have symptoms of shortness of breath    We encouraged modest weight loss through implementing appropriate dietary measures as well as initiation of a graded exercise program with the ultimate goal of 150 minutes of aerobic exercise weekly. I will address the patient's cardiac risk factors and adjusted pharmacologic treatment as needed. In addition, I have reinforced the need for patient directed risk factor modification. All questions and concerns were addressed to the patient/family. Alternatives to my treatment were discussed. Patient verbalized understanding. We will plan to call with echo results and f/u PRN. Thank you very much for allowing me to participate in the care of your patient. Please do not hesitate to contact me if you have any questions. Sincerely,  MD Nitin Hood [address] [place] [address]  Ph: [phone]  Fax: [phone]    This note was scribed in the presence of Dr. Collis Aase, MD by Luiz Kessler RN.

## 2022-08-19 ENCOUNTER — OFFICE VISIT (OUTPATIENT)
Dept: BARIATRICS/WEIGHT MGMT | Age: 52
End: 2022-08-19
Payer: COMMERCIAL

## 2022-08-19 VITALS
BODY MASS INDEX: 37.82 KG/M2 | DIASTOLIC BLOOD PRESSURE: 78 MMHG | HEIGHT: 65 IN | SYSTOLIC BLOOD PRESSURE: 136 MMHG | OXYGEN SATURATION: 98 % | HEART RATE: 84 BPM | RESPIRATION RATE: 18 BRPM | WEIGHT: 227 LBS

## 2022-08-19 DIAGNOSIS — E78.2 MIXED HYPERLIPIDEMIA: ICD-10-CM

## 2022-08-19 DIAGNOSIS — K21.9 CHRONIC GERD: ICD-10-CM

## 2022-08-19 DIAGNOSIS — I10 ESSENTIAL HYPERTENSION: ICD-10-CM

## 2022-08-19 DIAGNOSIS — E66.01 MORBID OBESITY WITH BMI OF 40.0-44.9, ADULT (HCC): Primary | ICD-10-CM

## 2022-08-19 DIAGNOSIS — K43.9 VENTRAL HERNIA WITHOUT OBSTRUCTION OR GANGRENE: ICD-10-CM

## 2022-08-19 DIAGNOSIS — R73.03 PREDIABETES: ICD-10-CM

## 2022-08-19 PROCEDURE — 3017F COLORECTAL CA SCREEN DOC REV: CPT | Performed by: SURGERY

## 2022-08-19 PROCEDURE — 99214 OFFICE O/P EST MOD 30 MIN: CPT | Performed by: SURGERY

## 2022-08-19 PROCEDURE — G8417 CALC BMI ABV UP PARAM F/U: HCPCS | Performed by: SURGERY

## 2022-08-19 PROCEDURE — G8427 DOCREV CUR MEDS BY ELIG CLIN: HCPCS | Performed by: SURGERY

## 2022-08-19 PROCEDURE — 1036F TOBACCO NON-USER: CPT | Performed by: SURGERY

## 2022-08-19 NOTE — PROGRESS NOTES
[facility]) Physicians   [employer]  Andria Read MD, [facility], [address], [address] [facility] [address] . Phone: [phone]  Fax: [phone]          Chief Complaint   Patient presents with    Obesity     5th pre-surg         HPI:     Cadence Ardon is a very pleasant 45 y.o. female with Body mass index is 37.77 kg/m². / Chronic Obesity. Padmini Mary has been struggling for several years now with obesity. Padmini Mary feels the weight is an obstacle to achieve and perform things in daily living as well risk on health. Patient  is very determined to lose weight and be healthy, and is working towards  surgical weight loss to achieve this goal. Pre-operative clearance and work up pending. Working hard to keep good dietary habits as well level of activity. Patient denies any nausea, vomiting, fevers, chills, shortness of breath, chest pain, cough, constipation or difficulty urinating. Pain Assessment   Denies any abdominal pain       Past Medical History:   Diagnosis Date    Arthritis     Asthma     Hypertension     Obesity (BMI 30-39. 9)      Past Surgical History:   Procedure Laterality Date    UPPER GASTROINTESTINAL ENDOSCOPY N/A 6/24/2022    EGD BIOPSY performed by Jonathan Alvarez MD at [address]     History reviewed. No pertinent family history. Social History     Tobacco Use    Smoking status: Never    Smokeless tobacco: Never   Substance Use Topics    Alcohol use: No     I counseled the patient on the importance of not smoking and risks of ETOH. No Known Allergies  Vitals:    08/19/22 1157   BP: 136/78   Pulse: 84   Resp: 18   SpO2: 98%   Weight: 227 lb (103 kg)   Height: 5' 5\" (1.651 m)       Body mass index is 37.77 kg/m².     Lab Results   Component Value Date/Time    WBC 5.5 10/02/2021 04:27 PM    RBC 4.54 10/02/2021 04:27 PM    HGB 12.7 10/02/2021 04:27 PM    HCT 38.6 10/02/2021 04:27 PM    MCV 85.0 10/02/2021 04:27 PM    MCH 28.1 10/02/2021 04:27 PM    MCHC 33.0 10/02/2021 04:27 PM    MPV 7.8 10/02/2021 04:27 PM    NEUTOPHILPCT 50.8 10/02/2021 04:27 PM    LYMPHOPCT 37.8 10/02/2021 04:27 PM    MONOPCT 9.4 10/02/2021 04:27 PM    EOSRELPCT 1.5 10/02/2021 04:27 PM    BASOPCT 0.5 10/02/2021 04:27 PM    NEUTROABS 2.8 10/02/2021 04:27 PM    LYMPHSABS 2.1 10/02/2021 04:27 PM    MONOSABS 0.5 10/02/2021 04:27 PM    EOSABS 0.1 10/02/2021 04:27 PM     Lab Results   Component Value Date/Time     03/08/2022 08:46 AM    K 4.4 03/08/2022 08:46 AM    K 3.6 10/02/2021 04:27 PM     03/08/2022 08:46 AM    CO2 25 03/08/2022 08:46 AM    ANIONGAP 13 03/08/2022 08:46 AM    GLUCOSE 92 03/08/2022 08:46 AM    BUN 12 03/08/2022 08:46 AM    CREATININE 0.8 03/08/2022 08:46 AM    LABGLOM >60 03/08/2022 08:46 AM    GFRAA >60 03/08/2022 08:46 AM    CALCIUM 9.3 03/08/2022 08:46 AM    PROT 7.4 03/08/2022 08:46 AM    LABALBU 4.2 03/08/2022 08:46 AM    AGRATIO 1.3 03/08/2022 08:46 AM    BILITOT 0.3 03/08/2022 08:46 AM    ALKPHOS 78 03/08/2022 08:46 AM    ALT 20 03/08/2022 08:46 AM    AST 16 03/08/2022 08:46 AM    GLOB 4.0 10/02/2021 04:27 PM     Lab Results   Component Value Date/Time    CHOL 201 03/08/2022 08:46 AM    TRIG 46 03/08/2022 08:46 AM    HDL 85 03/08/2022 08:46 AM    LDLCALC 107 03/08/2022 08:46 AM    LABVLDL 9 03/08/2022 08:46 AM     Lab Results   Component Value Date/Time    TSHREFLEX 2.63 03/08/2022 08:46 AM     Lab Results   Component Value Date/Time    IRON 73 03/08/2022 08:46 AM    TIBC 401 03/08/2022 08:46 AM    LABIRON 18 03/08/2022 08:46 AM     Lab Results   Component Value Date/Time    ABZIVRFX49 1391 03/08/2022 08:46 AM    FOLATE 14.51 03/08/2022 08:46 AM     Lab Results   Component Value Date/Time    VITD25 30.0 03/08/2022 08:46 AM     Lab Results   Component Value Date/Time    LABA1C 6.4 10/25/2021 01:06 PM    .0 10/25/2021 01:06 PM         Current Outpatient Medications:     cyclobenzaprine (FLEXERIL) 10 MG tablet, Take 1 tablet by mouth 2 times daily as needed for Muscle spasms, Disp: 60 tablet, Rfl: 0    diclofenac sodium (VOLTAREN) 1 % GEL, Apply 2 g topically in the morning and 2 g at noon and 2 g in the evening and 2 g before bedtime. , Disp: 150 g, Rfl: 0    diphenhydrAMINE-APAP, sleep, (TYLENOL PM EXTRA STRENGTH PO), Take 2 tablets by mouth as needed, Disp: , Rfl:     omeprazole (PRILOSEC) 20 MG delayed release capsule, Take 1 capsule by mouth every morning (before breakfast), Disp: 90 capsule, Rfl: 1    metFORMIN (GLUCOPHAGE-XR) 500 MG extended release tablet, Take 1 tablet by mouth daily (with breakfast), Disp: 30 tablet, Rfl: 5    PARoxetine (PAXIL CR) 12.5 MG extended release tablet, Take 12.5 mg by mouth daily , Disp: , Rfl:     FLUoxetine (PROZAC) 10 MG capsule, TAKE 1 CAPSULE BY MOUTH ONCE DAILY, Disp: , Rfl:     NIFEdipine (ADALAT CC) 30 MG extended release tablet, Take 30 mg by mouth daily , Disp: , Rfl:     Magic Mouthwash (MIRACLE MOUTHWASH), Swish and spit 10 mLs 4 times daily as needed for Irritation or Pain Dispense as Magic Mouthwash. Please add Equal Parts: 60 mL Maalox, 60 mL Viscous Lidocaine, 60 mL Benadryl to 60mL of Carafate. 10 ml swish and spit or swallow as directed above., Disp: 240 mL, Rfl: 0    albuterol sulfate HFA (VENTOLIN HFA) 108 (90 Base) MCG/ACT inhaler, Inhale 2 puffs into the lungs 4 times daily as needed for Wheezing or Shortness of Breath, Disp: 18 g, Rfl: 0    naproxen (NAPROSYN) 500 MG tablet, Take 1 tablet by mouth in the morning and 1 tablet before bedtime. Do all this for 14 days. , Disp: 28 tablet, Rfl: 0    Review of Systems - History obtained from the patient  General ROS: negative  Psychological ROS: negative  Ophthalmic ROS: negative  Neurological ROS: negative  ENT ROS: negative  Allergy and Immunology ROS: negative  Hematological and Lymphatic ROS: negative  Endocrine ROS: negative  Breast ROS: negative  Respiratory ROS: negative  Cardiovascular ROS: negative  Gastrointestinal ROS:negative  Genito-Urinary ROS: negative  Musculoskeletal ROS: negative   Skin ROS: negative    Physical Exam   Vitals Reviewed   Constitutional: Patient is oriented to person, place, and time. Patient appears well-developed and well-nourished. Patient is active and cooperative. Non-toxic appearance. No distress. HENT:   Head: Normocephalic and atraumatic. Head is without abrasion and without laceration. Hair is normal.   Right Ear: External ear normal. No lacerations. No drainage, swelling . Left Ear: External ear normal. No lacerations. No drainage, swelling. Nose/Mouth: face mask in place  Eyes: Conjunctivae, EOM and lids are normal. Right eye exhibits no discharge. No foreign body present in the right eye. Left eye exhibits no discharge. No foreign body present in the left eye. No scleral icterus. Neck: Trachea normal and normal range of motion. No JVD present. Pulmonary/Chest: Effort normal. No accessory muscle usage or stridor. No apnea. No respiratory distress. Cardiovascular: Normal rate and no JVD. Abdominal: Normal appearance. Patient exhibits no distension. Abdomen is soft, obese, non tender. Musculoskeletal: Normal range of motion. Patient exhibits no edema. Neurological: Patient is alert and oriented to person, place, and time. Patient has normal strength. GCS eye subscore is 4. GCS verbal subscore is 5. GCS motor subscore is 6. Skin: Skin is warm and dry. No abrasion and no rash noted. Patient is not diaphoretic. No cyanosis or erythema. Psychiatric: Patient has a normal mood and affect. Speech is normal and behavior is normal. Cognition and memory are normal.       A/P    Denzel Schumacher is 45 y.o. female, Body mass index is 37.77 kg/m². pre surgery, has lost 12 lbs since last visit. The patient underwent extensive dietary counseling with registered dietician. I have reviewed, discussed and agree with the dietary plan.  Patient is trying hard to keep good dietary and behavior modifications. Patient is monitoring portion sizes, food choices and liquid calories. Patient is trying to exercise regularly as much as possible. Obesity as a disease is considered a high risk to patients overall health and should therefore be considered a high risk disease state. Advised the patient that not getting there weight under control, that could increase risk of complications/worsening of those conditions on the long-term. (Goal of weight loss surgery is to alleviate/control some of those co-morbidities)    Now with Covid-19 pandemic, CDC and health authorities does classify obese patients as vulnerable and high risk as well. Which makes weight loss a priority for improvement of their wellbeing and overall health. I encouraged the patient to continue exercise and keeping healthy eating habits. Discussed pre-op labs and work up till now. Also counseled the patient extensively on Surgery. The visit today, included any number of the following: Bariatric Preoperative work up/protocols, review of labs, imaging, provider notes, outside hospital records, performing examination/evaluation, counseling patient and/or family, ordering medications/tests, placing referrals and communication with referring physicians, coordination of care; discussing dietary plan/recall with the patient as well with registered dietitian and documentation in the EHR. Of note, the above was done during same day of the actual patient encounter. Jakub Pierson is here for her fifth presurgical visit. Patient is doing much better than before. Patient starting to make better consistent healthy dietary choices. Patient needs to schedule her psych evaluation as soon as possible. Patient saw cardiology and an echo scheduled for September 14. I am very hopeful patient will be done with all her preoperative clearances by next visit and then we can go ahead and schedule her surgery.       We discussed how her excess weight affects her overall health and importance of weight loss, healthy diet and active lifestyle to alleviate those co morbid conditions, otherwise risk deterioration. Severe Obesity: Body mass index is 37.77 kg/m². [x] Continue to make dietary and lifestyle modifications. [x] Plan for Future laparoscopic sleeve gastrectomy. [x] Return for follow-up next month. Chronic GERD:   [x] Continue to make dietary and lifestyle modifications. [x] Continue Omeprazole. [] Continue Famotidine. [] Plan for EGD to evaluate the stomach. Essential Hypertension:  [x] Continue to make dietary and lifestyle modifications. [x] Reviewed the importance of checking blood pressure. [x] Continue to follow up with their PCP for medication management and monitoring. Hyperlipidemia:   [x] Continue to make dietary and lifestyle modifications. [x] Continue to follow up with their PCP for medication management and monitoring. Patient advised that its their responsibility to follow up for studies, referrals and/or labs ordered today.

## 2022-08-19 NOTE — PROGRESS NOTES
Elias Sheehan lost 12 lbs over 1 month. [facility]  utilized for visit. Decreased amount of food. Increased fruits and vegetables. Breakfast: 3 eggs + tea instead of coffee    Snack: fruit and vegetables    Lunch: ([employer] once a week) chicken/fish/lamb with salad    Snack: watermelon/orange/grapes    Dinner: chicken/fish/lamb sometimes with potato and salad    Snack: small amount of fruit    Is pt consuming smaller portions? yes , she is    Is pt consuming at least 64 oz of fluids per day? yes , she is    Is pt consuming carbonated, caffeinated, or sugary beverages? no but does drink tea (from [employer] - unsure of caffeine content - will bring to next visit)    Has pt sampled Unjury and/or Nectar protein? Bought grab n go packets today    Has patient attended a support group?  Scheduled  - Sept 13th    Exercise: Walking a lot (2-3x/week 30-45 minutes)    Plan/Recommendations:   Support group scheduled   Try protein powders  Protein with snacks and stick to 1 serving of fruit per snack    Handouts: Support groups    Mouna Carrera, MORGAN, LD

## 2022-08-30 DIAGNOSIS — K21.9 CHRONIC GERD: ICD-10-CM

## 2022-08-30 DIAGNOSIS — K43.9 VENTRAL HERNIA WITHOUT OBSTRUCTION OR GANGRENE: ICD-10-CM

## 2022-08-30 DIAGNOSIS — R73.03 PREDIABETES: ICD-10-CM

## 2022-08-30 DIAGNOSIS — E66.01 MORBID OBESITY WITH BMI OF 40.0-44.9, ADULT (HCC): ICD-10-CM

## 2022-08-30 DIAGNOSIS — I10 ESSENTIAL HYPERTENSION: ICD-10-CM

## 2022-08-30 DIAGNOSIS — E78.2 MIXED HYPERLIPIDEMIA: ICD-10-CM

## 2022-08-30 LAB
AMPHETAMINE SCREEN, URINE: NORMAL
BARBITURATE SCREEN URINE: NORMAL
BENZODIAZEPINE SCREEN, URINE: NORMAL
CANNABINOID SCREEN URINE: NORMAL
COCAINE METABOLITE SCREEN URINE: NORMAL
ETHANOL: NORMAL MG/DL (ref 0–0.08)
HCG(URINE) PREGNANCY TEST: NEGATIVE
Lab: NORMAL
METHADONE SCREEN, URINE: NORMAL
OPIATE SCREEN URINE: NORMAL
OXYCODONE URINE: NORMAL
PH UA: 8
PHENCYCLIDINE SCREEN URINE: NORMAL

## 2022-09-02 LAB
COTININE: <5 NG/ML
NICOTINE: <5 NG/ML

## 2022-09-06 ENCOUNTER — TELEPHONE (OUTPATIENT)
Dept: ORTHOPEDIC SURGERY | Age: 52
End: 2022-09-06

## 2022-09-06 NOTE — TELEPHONE ENCOUNTER
09/06/2022 GELSYN-3  (SERIES OF 3)  BILATERAL KNEES. Holiday LISS Heard # [id number]    DATES:  09/02/2022 - 03/02/2023   PER FAX FROM [employer]. I left a message to inform the patient. Please schedule when she calls back.

## 2022-09-08 ENCOUNTER — OFFICE VISIT (OUTPATIENT)
Dept: ORTHOPEDIC SURGERY | Age: 52
End: 2022-09-08
Payer: COMMERCIAL

## 2022-09-08 VITALS — BODY MASS INDEX: 37.82 KG/M2 | HEIGHT: 65 IN | WEIGHT: 227 LBS

## 2022-09-08 DIAGNOSIS — M17.12 PRIMARY OSTEOARTHRITIS OF LEFT KNEE: Primary | ICD-10-CM

## 2022-09-08 DIAGNOSIS — M17.11 PRIMARY OSTEOARTHRITIS OF RIGHT KNEE: ICD-10-CM

## 2022-09-08 PROCEDURE — 20610 DRAIN/INJ JOINT/BURSA W/O US: CPT | Performed by: ORTHOPAEDIC SURGERY

## 2022-09-08 PROCEDURE — 99999 PR OFFICE/OUTPT VISIT,PROCEDURE ONLY: CPT | Performed by: ORTHOPAEDIC SURGERY

## 2022-09-08 NOTE — PROGRESS NOTES
Cb Armenta  [id number]  September 8, 2022    Chief Complaint   Patient presents with    Follow-up     1st Gelsyn bilat knee       Ms. Sherrell Villafuerte is here in follow-up regarding her bilateral  knees. She is having no problems or concerns. Her pain has worsened. She is here for the first Gelsyn injections. Physical Exam:   Examination of the bilateral knees reveals no sign of synovitis. There is minimal to no swelling. Examination is essentially unchanged. Impression:  bilateral knee osteoarthritis. Plan: We will go ahead and administer the first Gelsyn injections into the bilateral knees under sterile conditions. After a betadine prep of the knee joints, 16.8 mg/2 ml of Gelsyn was injected into the knees. The patient tolerated the injections rather well. The patient was instructed to follow up for any signs of infection. Dipak Guerra M.D.

## 2022-09-14 ENCOUNTER — HOSPITAL ENCOUNTER (OUTPATIENT)
Dept: CARDIOLOGY | Age: 52
Discharge: HOME | End: 2022-09-14
Payer: COMMERCIAL

## 2022-09-14 DIAGNOSIS — I10 ESSENTIAL HYPERTENSION: ICD-10-CM

## 2022-09-14 DIAGNOSIS — R94.31 ABNORMAL EKG: ICD-10-CM

## 2022-09-14 DIAGNOSIS — E66.9 CLASS 2 OBESITY WITHOUT SERIOUS COMORBIDITY WITH BODY MASS INDEX (BMI) OF 38.0 TO 38.9 IN ADULT, UNSPECIFIED OBESITY TYPE: ICD-10-CM

## 2022-09-14 LAB
LV EF: 58 %
LVEF MODALITY: NORMAL

## 2022-09-14 PROCEDURE — 93306 TTE W/DOPPLER COMPLETE: CPT

## 2022-09-15 ENCOUNTER — OFFICE VISIT (OUTPATIENT)
Dept: ORTHOPEDIC SURGERY | Age: 52
End: 2022-09-15
Payer: COMMERCIAL

## 2022-09-15 ENCOUNTER — TELEPHONE (OUTPATIENT)
Dept: CARDIOLOGY CLINIC | Age: 52
End: 2022-09-15

## 2022-09-15 VITALS — BODY MASS INDEX: 37.15 KG/M2 | WEIGHT: 223 LBS | HEIGHT: 65 IN

## 2022-09-15 DIAGNOSIS — M17.12 PRIMARY OSTEOARTHRITIS OF LEFT KNEE: Primary | ICD-10-CM

## 2022-09-15 DIAGNOSIS — M17.11 PRIMARY OSTEOARTHRITIS OF RIGHT KNEE: ICD-10-CM

## 2022-09-15 PROCEDURE — 99999 PR OFFICE/OUTPT VISIT,PROCEDURE ONLY: CPT | Performed by: ORTHOPAEDIC SURGERY

## 2022-09-15 PROCEDURE — 20610 DRAIN/INJ JOINT/BURSA W/O US: CPT | Performed by: ORTHOPAEDIC SURGERY

## 2022-09-15 NOTE — TELEPHONE ENCOUNTER
Monitor placed by [employer] [facility] monitor 30 day  Monitor ordered by  Dr. Tess Britt  Serial number [id number]  Kit ID [id number]  Activation successful prior to pt leaving office?  Yes

## 2022-09-15 NOTE — PROGRESS NOTES
Socrates Phillips  [id number]  September 15, 2022    Chief Complaint   Patient presents with    Follow-up     #2 Gelsyn injections, bilateral knee. Ms. Wood Mcknight is here in follow-up regarding her bilateral  knees. She is having no problems or concerns. Her pain has improved. She is here for the second Gelsyn injections. Physical Exam:   Examination of the bilateral knees reveals no sign of synovitis. There is minimal to no swelling. Examination is essentially unchanged. Impression:  bilateral knee osteoarthritis. Plan: We will go ahead and administer the second Gelsyn injections into the bilateral knees under sterile conditions. After a betadine prep of the knee joints, 16.8 mg/2 ml of Gelsyn was injected into the knees. The patient tolerated the injections rather well. The patient was instructed to follow up for any signs of infection. Dipak Irizarry M.D.

## 2022-09-15 NOTE — TELEPHONE ENCOUNTER
Alvarado Judge,   Please delete from Spread sheet. This patient is not getting a monitor. Thank you.

## 2022-09-22 ENCOUNTER — OFFICE VISIT (OUTPATIENT)
Dept: ORTHOPEDIC SURGERY | Age: 52
End: 2022-09-22
Payer: COMMERCIAL

## 2022-09-22 VITALS — WEIGHT: 221 LBS | BODY MASS INDEX: 36.82 KG/M2 | HEIGHT: 65 IN

## 2022-09-22 DIAGNOSIS — M17.11 PRIMARY OSTEOARTHRITIS OF RIGHT KNEE: ICD-10-CM

## 2022-09-22 DIAGNOSIS — M17.12 PRIMARY OSTEOARTHRITIS OF LEFT KNEE: Primary | ICD-10-CM

## 2022-09-22 PROCEDURE — 20610 DRAIN/INJ JOINT/BURSA W/O US: CPT | Performed by: ORTHOPAEDIC SURGERY

## 2022-09-22 PROCEDURE — 99999 PR OFFICE/OUTPT VISIT,PROCEDURE ONLY: CPT | Performed by: ORTHOPAEDIC SURGERY

## 2022-09-22 RX ORDER — CYCLOBENZAPRINE HCL 10 MG
10 TABLET ORAL 2 TIMES DAILY PRN
Qty: 60 TABLET | Refills: 0 | Status: SHIPPED | OUTPATIENT
Start: 2022-09-22 | End: 2022-10-22

## 2022-09-29 ENCOUNTER — OFFICE VISIT (OUTPATIENT)
Dept: BARIATRICS/WEIGHT MGMT | Age: 52
End: 2022-09-29
Payer: COMMERCIAL

## 2022-09-29 VITALS
BODY MASS INDEX: 38.07 KG/M2 | OXYGEN SATURATION: 99 % | DIASTOLIC BLOOD PRESSURE: 89 MMHG | HEIGHT: 64 IN | RESPIRATION RATE: 18 BRPM | SYSTOLIC BLOOD PRESSURE: 139 MMHG | HEART RATE: 99 BPM | WEIGHT: 223 LBS

## 2022-09-29 DIAGNOSIS — I10 ESSENTIAL HYPERTENSION: ICD-10-CM

## 2022-09-29 DIAGNOSIS — E66.01 MORBID OBESITY WITH BMI OF 40.0-44.9, ADULT (HCC): ICD-10-CM

## 2022-09-29 DIAGNOSIS — R73.03 PREDIABETES: ICD-10-CM

## 2022-09-29 DIAGNOSIS — E78.2 MIXED HYPERLIPIDEMIA: Primary | ICD-10-CM

## 2022-09-29 DIAGNOSIS — K21.9 CHRONIC GERD: ICD-10-CM

## 2022-09-29 PROCEDURE — 1036F TOBACCO NON-USER: CPT | Performed by: SURGERY

## 2022-09-29 PROCEDURE — G8427 DOCREV CUR MEDS BY ELIG CLIN: HCPCS | Performed by: SURGERY

## 2022-09-29 PROCEDURE — 99214 OFFICE O/P EST MOD 30 MIN: CPT | Performed by: SURGERY

## 2022-09-29 PROCEDURE — 3017F COLORECTAL CA SCREEN DOC REV: CPT | Performed by: SURGERY

## 2022-09-29 PROCEDURE — G8417 CALC BMI ABV UP PARAM F/U: HCPCS | Performed by: SURGERY

## 2022-09-29 NOTE — PROGRESS NOTES
[facility]) Physicians   [employer]  Ramiro Anne MD, [address], [address], [address] [facility]    [place]  [address] . Phone: [phone]  Fax: [phone]          Chief Complaint   Patient presents with    Obesity     6th pre-surg         HPI:     Harsh Barboza is a very pleasant 45 y.o. female with Body mass index is 38.28 kg/m². / Chronic Obesity. Jakub Pierson has been struggling for several years now with obesity. Jakub Pierson feels the weight is an obstacle to achieve and perform things in daily living as well risk on health. Patient  is very determined to lose weight and be healthy, and is working towards  surgical weight loss to achieve this goal. Pre-operative clearance and work up pending. Working hard to keep good dietary habits as well level of activity. Patient denies any nausea, vomiting, fevers, chills, shortness of breath, chest pain, cough, constipation or difficulty urinating. Pain Assessment   Denies any abdominal pain       Past Medical History:   Diagnosis Date    Arthritis     Asthma     Hypertension     Obesity (BMI 30-39. 9)      Past Surgical History:   Procedure Laterality Date    UPPER GASTROINTESTINAL ENDOSCOPY N/A 6/24/2022    EGD BIOPSY performed by Jamie Mcwilliams MD at [address]     History reviewed. No pertinent family history. Social History     Tobacco Use    Smoking status: Never    Smokeless tobacco: Never   Substance Use Topics    Alcohol use: No     I counseled the patient on the importance of not smoking and risks of ETOH. No Known Allergies  Vitals:    09/29/22 1142   BP: 139/89   Pulse: 99   Resp: 18   SpO2: 99%   Weight: 223 lb (101.2 kg)   Height: 5' 4\" (1.626 m)       Body mass index is 38.28 kg/m².     Lab Results   Component Value Date/Time    WBC 5.5 10/02/2021 04:27 PM    RBC 4.54 10/02/2021 04:27 PM    HGB 12.7 10/02/2021 04:27 PM    HCT 38.6 10/02/2021 04:27 PM    MCV 85.0 10/02/2021 04:27 PM    MCH 28.1 10/02/2021 04:27 PM    MCHC 33.0 10/02/2021 04:27 PM    MPV 7.8 10/02/2021 04:27 PM    NEUTOPHILPCT 50.8 10/02/2021 04:27 PM    LYMPHOPCT 37.8 10/02/2021 04:27 PM    MONOPCT 9.4 10/02/2021 04:27 PM    EOSRELPCT 1.5 10/02/2021 04:27 PM    BASOPCT 0.5 10/02/2021 04:27 PM    NEUTROABS 2.8 10/02/2021 04:27 PM    LYMPHSABS 2.1 10/02/2021 04:27 PM    MONOSABS 0.5 10/02/2021 04:27 PM    EOSABS 0.1 10/02/2021 04:27 PM     Lab Results   Component Value Date/Time     03/08/2022 08:46 AM    K 4.4 03/08/2022 08:46 AM    K 3.6 10/02/2021 04:27 PM     03/08/2022 08:46 AM    CO2 25 03/08/2022 08:46 AM    ANIONGAP 13 03/08/2022 08:46 AM    GLUCOSE 92 03/08/2022 08:46 AM    BUN 12 03/08/2022 08:46 AM    CREATININE 0.8 03/08/2022 08:46 AM    LABGLOM >60 03/08/2022 08:46 AM    GFRAA >60 03/08/2022 08:46 AM    CALCIUM 9.3 03/08/2022 08:46 AM    PROT 7.4 03/08/2022 08:46 AM    LABALBU 4.2 03/08/2022 08:46 AM    AGRATIO 1.3 03/08/2022 08:46 AM    BILITOT 0.3 03/08/2022 08:46 AM    ALKPHOS 78 03/08/2022 08:46 AM    ALT 20 03/08/2022 08:46 AM    AST 16 03/08/2022 08:46 AM    GLOB 4.0 10/02/2021 04:27 PM     Lab Results   Component Value Date/Time    CHOL 201 03/08/2022 08:46 AM    TRIG 46 03/08/2022 08:46 AM    HDL 85 03/08/2022 08:46 AM    LDLCALC 107 03/08/2022 08:46 AM    LABVLDL 9 03/08/2022 08:46 AM     Lab Results   Component Value Date/Time    TSHREFLEX 2.63 03/08/2022 08:46 AM     Lab Results   Component Value Date/Time    IRON 73 03/08/2022 08:46 AM    TIBC 401 03/08/2022 08:46 AM    LABIRON 18 03/08/2022 08:46 AM     Lab Results   Component Value Date/Time    [id number] 1391 03/08/2022 08:46 AM    FOLATE 14.51 03/08/2022 08:46 AM     Lab Results   Component Value Date/Time    VITD25 30.0 03/08/2022 08:46 AM     Lab Results   Component Value Date/Time    LABA1C 6.4 10/25/2021 01:06 PM    .0 10/25/2021 01:06 PM         Current Outpatient Medications:     cyclobenzaprine (FLEXERIL) 10 MG tablet, Take 1 tablet by mouth 2 times daily as needed for Muscle spasms, Disp: 60 tablet, Rfl: 0    diclofenac sodium (VOLTAREN) 1 % GEL, Apply 2 g topically in the morning and 2 g at noon and 2 g in the evening and 2 g before bedtime. , Disp: 150 g, Rfl: 0    diphenhydrAMINE-APAP, sleep, (TYLENOL PM EXTRA STRENGTH PO), Take 2 tablets by mouth as needed, Disp: , Rfl:     omeprazole (PRILOSEC) 20 MG delayed release capsule, Take 1 capsule by mouth every morning (before breakfast), Disp: 90 capsule, Rfl: 1    metFORMIN (GLUCOPHAGE-XR) 500 MG extended release tablet, Take 1 tablet by mouth daily (with breakfast), Disp: 30 tablet, Rfl: 5    PARoxetine (PAXIL CR) 12.5 MG extended release tablet, Take 12.5 mg by mouth daily , Disp: , Rfl:     FLUoxetine (PROZAC) 10 MG capsule, TAKE 1 CAPSULE BY MOUTH ONCE DAILY, Disp: , Rfl:     NIFEdipine (ADALAT CC) 30 MG extended release tablet, Take 30 mg by mouth daily , Disp: , Rfl:     Magic Mouthwash (MIRACLE MOUTHWASH), Swish and spit 10 mLs 4 times daily as needed for Irritation or Pain Dispense as Magic Mouthwash. Please add Equal Parts: 60 mL Maalox, 60 mL Viscous Lidocaine, 60 mL Benadryl to 60mL of Carafate. 10 ml swish and spit or swallow as directed above., Disp: 240 mL, Rfl: 0    albuterol sulfate HFA (VENTOLIN HFA) 108 (90 Base) MCG/ACT inhaler, Inhale 2 puffs into the lungs 4 times daily as needed for Wheezing or Shortness of Breath, Disp: 18 g, Rfl: 0    naproxen (NAPROSYN) 500 MG tablet, Take 1 tablet by mouth in the morning and 1 tablet before bedtime. Do all this for 14 days. , Disp: 28 tablet, Rfl: 0    Review of Systems - History obtained from the patient  General ROS: negative  Psychological ROS: negative  Ophthalmic ROS: negative  Neurological ROS: negative  ENT ROS: negative  Allergy and Immunology ROS: negative  Hematological and Lymphatic ROS: negative  Endocrine ROS: negative  Breast ROS: negative  Respiratory ROS: negative  Cardiovascular ROS: negative  Gastrointestinal ROS:negative  Genito-Urinary ROS: negative  Musculoskeletal ROS: negative   Skin ROS: negative    Physical Exam   Vitals Reviewed   Constitutional: Patient is oriented to person, place, and time. Patient appears well-developed and well-nourished. Patient is active and cooperative. Non-toxic appearance. No distress. HENT:   Head: Normocephalic and atraumatic. Head is without abrasion and without laceration. Hair is normal.   Right Ear: External ear normal. No lacerations. No drainage, swelling . Left Ear: External ear normal. No lacerations. No drainage, swelling. Nose/Mouth: face mask in place  Eyes: Conjunctivae, EOM and lids are normal. Right eye exhibits no discharge. No foreign body present in the right eye. Left eye exhibits no discharge. No foreign body present in the left eye. No scleral icterus. Neck: Trachea normal and normal range of motion. No JVD present. Pulmonary/Chest: Effort normal. No accessory muscle usage or stridor. No apnea. No respiratory distress. Cardiovascular: Normal rate and no JVD. Abdominal: Normal appearance. Patient exhibits no distension. Abdomen is soft, obese, non tender. Musculoskeletal: Normal range of motion. Patient exhibits no edema. Neurological: Patient is alert and oriented to person, place, and time. Patient has normal strength. GCS eye subscore is 4. GCS verbal subscore is 5. GCS motor subscore is 6. Skin: Skin is warm and dry. No abrasion and no rash noted. Patient is not diaphoretic. No cyanosis or erythema. Psychiatric: Patient has a normal mood and affect. Speech is normal and behavior is normal. Cognition and memory are normal.       A/P    Vishal Apple is 45 y.o. female, Body mass index is 38.28 kg/m². pre surgery, has lost 4 lbs since last visit. The patient underwent extensive dietary counseling with registered dietician. I have reviewed, discussed and agree with the dietary plan.  Patient is trying hard to keep good dietary and behavior was done during same day of the actual patient encounter. Benigno Hudson is here for her 6 presurgical visit. Patient overall doing well. Patient is going to try more protein shakes. Patient has her psych evaluation October 10. Patient also will schedule herself for support group. Hopefully patient will be ready to schedule surgery by next visit. We discussed how her excess weight affects her overall health and importance of weight loss, healthy diet and active lifestyle to alleviate those co morbid conditions, otherwise risk deterioration. Morbid Obesity: Body mass index is 38.28 kg/m². [x] Continue to make dietary and lifestyle modifications. [x] Plan for Future laparoscopic sleeve gastrectomy. [x] Return for follow-up next month. Chronic GERD:   [x] Continue to make dietary and lifestyle modifications. [x] Continue Omeprazole. [] Continue Famotidine. [] Plan for EGD to evaluate the stomach. Essential Hypertension:  [x] Continue to make dietary and lifestyle modifications. [x] Reviewed the importance of checking blood pressure. [x] Continue to follow up with their PCP for medication management and monitoring. Prediabetes:   [x] Continue to make dietary and lifestyle modifications. [x] Reviewed the importance of checking blood sugars. [x] Continue to follow up with their PCP for medication management and monitoring. Patient advised that its their responsibility to follow up for studies, referrals and/or labs ordered today.

## 2022-10-25 ENCOUNTER — OFFICE VISIT (OUTPATIENT)
Dept: BARIATRICS/WEIGHT MGMT | Age: 52
End: 2022-10-25
Payer: COMMERCIAL

## 2022-10-25 VITALS
DIASTOLIC BLOOD PRESSURE: 75 MMHG | RESPIRATION RATE: 18 BRPM | WEIGHT: 217 LBS | HEART RATE: 98 BPM | SYSTOLIC BLOOD PRESSURE: 135 MMHG | HEIGHT: 64 IN | BODY MASS INDEX: 37.05 KG/M2 | OXYGEN SATURATION: 99 %

## 2022-10-25 DIAGNOSIS — R73.03 PREDIABETES: ICD-10-CM

## 2022-10-25 DIAGNOSIS — K43.9 VENTRAL HERNIA WITHOUT OBSTRUCTION OR GANGRENE: ICD-10-CM

## 2022-10-25 DIAGNOSIS — K21.9 CHRONIC GERD: ICD-10-CM

## 2022-10-25 DIAGNOSIS — I10 ESSENTIAL HYPERTENSION: ICD-10-CM

## 2022-10-25 DIAGNOSIS — E78.2 MIXED HYPERLIPIDEMIA: ICD-10-CM

## 2022-10-25 DIAGNOSIS — E66.9 OBESITY (BMI 30-39.9): Primary | ICD-10-CM

## 2022-10-25 PROCEDURE — 3078F DIAST BP <80 MM HG: CPT | Performed by: SURGERY

## 2022-10-25 PROCEDURE — G8427 DOCREV CUR MEDS BY ELIG CLIN: HCPCS | Performed by: SURGERY

## 2022-10-25 PROCEDURE — 3017F COLORECTAL CA SCREEN DOC REV: CPT | Performed by: SURGERY

## 2022-10-25 PROCEDURE — G8417 CALC BMI ABV UP PARAM F/U: HCPCS | Performed by: SURGERY

## 2022-10-25 PROCEDURE — 99214 OFFICE O/P EST MOD 30 MIN: CPT | Performed by: SURGERY

## 2022-10-25 PROCEDURE — 1036F TOBACCO NON-USER: CPT | Performed by: SURGERY

## 2022-10-25 PROCEDURE — 3074F SYST BP LT 130 MM HG: CPT | Performed by: SURGERY

## 2022-10-25 PROCEDURE — G8484 FLU IMMUNIZE NO ADMIN: HCPCS | Performed by: SURGERY

## 2022-10-25 NOTE — PROGRESS NOTES
Arti Chavira lost 6 lbs over 1 month. Visit completed via video  [address] [facility] # [id number]. Reviewed what pt will need to eat after surgery to be safe and successful. Breakfast: Eggs with canteloupe/grapes  Snack: None  Lunch: Fried fish OR fruits/veggies  Snack: None OR watermelon/grapes   Dinner: Fish + salad/cucumbers w/ ramos and vinegar dressing    Snack: None    Is pt consuming smaller portions? yes may only eat one item at a time    Is pt consuming at least 64 oz of fluids per day? yes    water + tea w/ splenda    Is pt consuming carbonated, caffeinated, or sugary beverages? no    Has pt sampled Unjury and/or Nectar protein? Yes    Has patient attended a support group?  Scheduled  today 11/15 zoom; pt feels comfortable without      Exercise: ADL    Plan/Recommendations:   - Add protein w/ al meals and snacks  - Eat 4 times each day - can use protein drinks  - Attend Support Group    Handouts: SG schedule     Ena Briggs RD, LD

## 2022-10-25 NOTE — PATIENT INSTRUCTIONS
Patient received dietary handouts and education.     Plan/Recommendations:   - Add protein w/ al meals and snacks  - Eat 4 times each day - can use protein drinks  - Attend Support Group

## 2022-10-25 NOTE — PROGRESS NOTES
[address] [place] [address]   [facility]  Keegan Mayfield MD, [facility], [address], [address] [facility]    [place]  [id number] . Phone: [phone]  Fax: [phone]          Chief Complaint   Patient presents with    Obesity     7th pre-surg         HPI:     Tariq Garza is a very pleasant 45 y.o. female with Body mass index is 37.25 kg/m². / Chronic Obesity. Mable Mcallister has been struggling for several years now with obesity. Mable Mcallister feels the weight is an obstacle to achieve and perform things in daily living as well risk on health. Patient  is very determined to lose weight and be healthy, and is working towards  surgical weight loss to achieve this goal. Pre-operative clearance and work up pending. Working hard to keep good dietary habits as well level of activity. Patient denies any nausea, vomiting, fevers, chills, shortness of breath, chest pain, cough, constipation or difficulty urinating. Pain Assessment   Denies any abdominal pain       Past Medical History:   Diagnosis Date    Arthritis     Asthma     Hypertension     Obesity (BMI 30-39. 9)      Past Surgical History:   Procedure Laterality Date    UPPER GASTROINTESTINAL ENDOSCOPY N/A 6/24/2022    EGD BIOPSY performed by Santos Sanchez MD at [address] [place] [address]     No family history on file. Social History     Tobacco Use    Smoking status: Never    Smokeless tobacco: Never   Substance Use Topics    Alcohol use: No     I counseled the patient on the importance of not smoking and risks of ETOH. No Known Allergies  Vitals:    10/25/22 1334   BP: 135/75   Pulse: 98   Resp: 18   SpO2: 99%   Weight: 217 lb (98.4 kg)   Height: 5' 4\" (1.626 m)       Body mass index is 37.25 kg/m².     Lab Results   Component Value Date/Time    WBC 5.5 10/02/2021 04:27 PM    RBC 4.54 10/02/2021 04:27 PM    HGB 12.7 10/02/2021 04:27 PM    HCT 38.6 10/02/2021 04:27 PM    MCV 85.0 10/02/2021 04:27 PM    MCH 28.1 10/02/2021 04:27 PM    MCHC 33.0 10/02/2021 04:27 PM    MPV 7.8 10/02/2021 04:27 PM    NEUTOPHILPCT 50.8 10/02/2021 04:27 PM    LYMPHOPCT 37.8 10/02/2021 04:27 PM    MONOPCT 9.4 10/02/2021 04:27 PM    EOSRELPCT 1.5 10/02/2021 04:27 PM    BASOPCT 0.5 10/02/2021 04:27 PM    NEUTROABS 2.8 10/02/2021 04:27 PM    LYMPHSABS 2.1 10/02/2021 04:27 PM    MONOSABS 0.5 10/02/2021 04:27 PM    EOSABS 0.1 10/02/2021 04:27 PM     Lab Results   Component Value Date/Time     03/08/2022 08:46 AM    K 4.4 03/08/2022 08:46 AM    K 3.6 10/02/2021 04:27 PM     03/08/2022 08:46 AM    CO2 25 03/08/2022 08:46 AM    ANIONGAP 13 03/08/2022 08:46 AM    GLUCOSE 92 03/08/2022 08:46 AM    BUN 12 03/08/2022 08:46 AM    CREATININE 0.8 03/08/2022 08:46 AM    LABGLOM >60 03/08/2022 08:46 AM    GFRAA >60 03/08/2022 08:46 AM    CALCIUM 9.3 03/08/2022 08:46 AM    PROT 7.4 03/08/2022 08:46 AM    LABALBU 4.2 03/08/2022 08:46 AM    AGRATIO 1.3 03/08/2022 08:46 AM    BILITOT 0.3 03/08/2022 08:46 AM    ALKPHOS 78 03/08/2022 08:46 AM    ALT 20 03/08/2022 08:46 AM    AST 16 03/08/2022 08:46 AM    GLOB 4.0 10/02/2021 04:27 PM     Lab Results   Component Value Date/Time    CHOL 201 03/08/2022 08:46 AM    TRIG 46 03/08/2022 08:46 AM    HDL 85 03/08/2022 08:46 AM    LDLCALC 107 03/08/2022 08:46 AM    LABVLDL 9 03/08/2022 08:46 AM     Lab Results   Component Value Date/Time    TSHREFLEX 2.63 03/08/2022 08:46 AM     Lab Results   Component Value Date/Time    IRON 73 03/08/2022 08:46 AM    TIBC 401 03/08/2022 08:46 AM    LABIRON 18 03/08/2022 08:46 AM     Lab Results   Component Value Date/Time    VZKFNPZZ70 1391 03/08/2022 08:46 AM    FOLATE 14.51 03/08/2022 08:46 AM     Lab Results   Component Value Date/Time    VITD25 30.0 03/08/2022 08:46 AM     Lab Results   Component Value Date/Time    LABA1C 6.4 10/25/2021 01:06 PM    .0 10/25/2021 01:06 PM         Current Outpatient Medications:     diclofenac sodium (VOLTAREN) 1 % GEL, Apply 2 g topically in the morning and 2 g at noon and 2 g in the evening and 2 g before bedtime. , Disp: 150 g, Rfl: 0    diphenhydrAMINE-APAP, sleep, (TYLENOL PM EXTRA STRENGTH PO), Take 2 tablets by mouth as needed, Disp: , Rfl:     omeprazole (PRILOSEC) 20 MG delayed release capsule, Take 1 capsule by mouth every morning (before breakfast), Disp: 90 capsule, Rfl: 1    metFORMIN (GLUCOPHAGE-XR) 500 MG extended release tablet, Take 1 tablet by mouth daily (with breakfast), Disp: 30 tablet, Rfl: 5    PARoxetine (PAXIL CR) 12.5 MG extended release tablet, Take 12.5 mg by mouth daily , Disp: , Rfl:     FLUoxetine (PROZAC) 10 MG capsule, TAKE 1 CAPSULE BY MOUTH ONCE DAILY, Disp: , Rfl:     NIFEdipine (ADALAT CC) 30 MG extended release tablet, Take 30 mg by mouth daily , Disp: , Rfl:     Magic Mouthwash (MIRACLE MOUTHWASH), Swish and spit 10 mLs 4 times daily as needed for Irritation or Pain Dispense as Magic Mouthwash. Please add Equal Parts: 60 mL Maalox, 60 mL Viscous Lidocaine, 60 mL Benadryl to 60mL of Carafate. 10 ml swish and spit or swallow as directed above., Disp: 240 mL, Rfl: 0    albuterol sulfate HFA (VENTOLIN HFA) 108 (90 Base) MCG/ACT inhaler, Inhale 2 puffs into the lungs 4 times daily as needed for Wheezing or Shortness of Breath, Disp: 18 g, Rfl: 0    naproxen (NAPROSYN) 500 MG tablet, Take 1 tablet by mouth in the morning and 1 tablet before bedtime. Do all this for 14 days. , Disp: 28 tablet, Rfl: 0    Review of Systems - History obtained from the patient  General ROS: negative  Psychological ROS: negative  Ophthalmic ROS: negative  Neurological ROS: negative  ENT ROS: negative  Allergy and Immunology ROS: negative  Hematological and Lymphatic ROS: negative  Endocrine ROS: negative  Breast ROS: negative  Respiratory ROS: negative  Cardiovascular ROS: negative  Gastrointestinal ROS:negative  Genito-Urinary ROS: negative  Musculoskeletal ROS: negative   Skin ROS: negative    Physical Exam   Vitals Reviewed   Constitutional: Patient is oriented to person, place, and time. Patient appears well-developed and well-nourished. Patient is active and cooperative. Non-toxic appearance. No distress. HENT:   Head: Normocephalic and atraumatic. Head is without abrasion and without laceration. Hair is normal.   Right Ear: External ear normal. No lacerations. No drainage, swelling . Left Ear: External ear normal. No lacerations. No drainage, swelling. Nose/Mouth: face mask in place  Eyes: Conjunctivae, EOM and lids are normal. Right eye exhibits no discharge. No foreign body present in the right eye. Left eye exhibits no discharge. No foreign body present in the left eye. No scleral icterus. Neck: Trachea normal and normal range of motion. No JVD present. Pulmonary/Chest: Effort normal. No accessory muscle usage or stridor. No apnea. No respiratory distress. Cardiovascular: Normal rate and no JVD. Abdominal: Normal appearance. Patient exhibits no distension. Abdomen is soft, obese, non tender. Musculoskeletal: Normal range of motion. Patient exhibits no edema. Neurological: Patient is alert and oriented to person, place, and time. Patient has normal strength. GCS eye subscore is 4. GCS verbal subscore is 5. GCS motor subscore is 6. Skin: Skin is warm and dry. No abrasion and no rash noted. Patient is not diaphoretic. No cyanosis or erythema. Psychiatric: Patient has a normal mood and affect. Speech is normal and behavior is normal. Cognition and memory are normal.       A/P    Artist Quick is 45 y.o. female, Body mass index is 37.25 kg/m². pre surgery, has lost 6 pounds since last visit. The patient underwent extensive dietary counseling with registered dietician. I have reviewed, discussed and agree with the dietary plan. Patient is trying hard to keep good dietary and behavior modifications. Patient is monitoring portion sizes, food choices and liquid calories. Patient is trying to exercise regularly as much as possible.     Obesity as a disease is considered a high risk to patients overall health and should therefore be considered a high risk disease state. Advised the patient that not getting there weight under control, that could increase risk of complications/worsening of those conditions on the long-term. (Goal of weight loss surgery is to alleviate/control some of those co-morbidities)    Now with Covid-19 pandemic, CDC and health authorities does classify obese patients as vulnerable and high risk as well. Which makes weight loss a priority for improvement of their wellbeing and overall health. I encouraged the patient to continue exercise and keeping healthy eating habits. Discussed pre-op labs and work up till now. Also counseled the patient extensively on Surgery. I did explain thoroughly to the patient that compliance with pre- and post op diet and other recommendations are integral part to improve the chances of successful weight loss and also not following it could end with serious health complications. Some strategies discussed today include but not limited to : 30/30/30 minutes rule, food diary, avoid fast food and packing/planing ahead, & increasing exercise. Also stressed to the patient importance of taking the multivitamins as instructed, otherwise risk significant complications. The visit today, included any number of the following: Bariatric Preoperative work up/protocols, review of labs, imaging, provider notes, outside hospital records, performing examination/evaluation, counseling patient and/or family, ordering medications/tests, placing referrals and communication with referring physicians, coordination of care; discussing dietary plan/recall with the patient as well with registered dietitian and documentation in the EHR. Of note, the above was done during same day of the actual patient encounter. Radhabree's overall making good progress. Just needs to be consistent with routine with her 4-5 meals a day. Patient needs still to get her psych evaluation. At that point I think she would be ready to schedule surgery. We discussed how her excess weight affects her overall health and importance of weight loss, healthy diet and active lifestyle to alleviate those co morbid conditions, otherwise risk deterioration. Morbid Obesity: Body mass index is 37.25 kg/m². [x] Continue to make dietary and lifestyle modifications. [x] Plan for Future laparoscopic sleeve gastrectomy. [x] Return for follow-up next month. Chronic GERD:   [x] Continue to make dietary and lifestyle modifications. [x] Continue Omeprazole. [] Continue Famotidine. [] Plan for EGD to evaluate the stomach. Essential Hypertension:  [x] Continue to make dietary and lifestyle modifications. [x] Reviewed the importance of checking blood pressure. [x] Continue to follow up with their PCP for medication management and monitoring. Prediabetes:   [x] Continue to make dietary and lifestyle modifications. [x] Reviewed the importance of checking blood sugars. [x] Continue to follow up with their PCP for medication management and monitoring. Hyperlipidemia:   [x] Continue to make dietary and lifestyle modifications. [x] Continue to follow up with their PCP for medication management and monitoring. Patient advised that its their responsibility to follow up for studies, referrals and/or labs ordered today.

## 2022-11-15 ENCOUNTER — OFFICE VISIT (OUTPATIENT)
Dept: BARIATRICS/WEIGHT MGMT | Age: 52
End: 2022-11-15
Payer: COMMERCIAL

## 2022-11-15 ENCOUNTER — INITIAL CONSULT (OUTPATIENT)
Dept: BARIATRICS/WEIGHT MGMT | Age: 52
End: 2022-11-15

## 2022-11-15 VITALS
RESPIRATION RATE: 18 BRPM | DIASTOLIC BLOOD PRESSURE: 76 MMHG | HEIGHT: 64 IN | SYSTOLIC BLOOD PRESSURE: 138 MMHG | WEIGHT: 225 LBS | HEART RATE: 99 BPM | BODY MASS INDEX: 38.41 KG/M2 | OXYGEN SATURATION: 99 %

## 2022-11-15 DIAGNOSIS — F43.22 ADJUSTMENT REACTION WITH ANXIETY: Primary | ICD-10-CM

## 2022-11-15 DIAGNOSIS — E66.01 SEVERE OBESITY (BMI 35.0-39.9) WITH COMORBIDITY (HCC): ICD-10-CM

## 2022-11-15 DIAGNOSIS — E66.01 MORBID OBESITY WITH BMI OF 40.0-44.9, ADULT (HCC): Primary | ICD-10-CM

## 2022-11-15 DIAGNOSIS — R73.03 PREDIABETES: ICD-10-CM

## 2022-11-15 DIAGNOSIS — E88.81 METABOLIC SYNDROME: ICD-10-CM

## 2022-11-15 DIAGNOSIS — E78.2 MIXED HYPERLIPIDEMIA: ICD-10-CM

## 2022-11-15 DIAGNOSIS — K21.9 CHRONIC GERD: ICD-10-CM

## 2022-11-15 DIAGNOSIS — I10 ESSENTIAL HYPERTENSION: ICD-10-CM

## 2022-11-15 PROBLEM — E88.810 METABOLIC SYNDROME: Status: ACTIVE | Noted: 2022-11-15

## 2022-11-15 PROCEDURE — 1036F TOBACCO NON-USER: CPT | Performed by: SURGERY

## 2022-11-15 PROCEDURE — 3074F SYST BP LT 130 MM HG: CPT | Performed by: SURGERY

## 2022-11-15 PROCEDURE — 99214 OFFICE O/P EST MOD 30 MIN: CPT | Performed by: SURGERY

## 2022-11-15 PROCEDURE — G8417 CALC BMI ABV UP PARAM F/U: HCPCS | Performed by: SURGERY

## 2022-11-15 PROCEDURE — 3078F DIAST BP <80 MM HG: CPT | Performed by: SURGERY

## 2022-11-15 PROCEDURE — 3017F COLORECTAL CA SCREEN DOC REV: CPT | Performed by: SURGERY

## 2022-11-15 PROCEDURE — G8427 DOCREV CUR MEDS BY ELIG CLIN: HCPCS | Performed by: SURGERY

## 2022-11-15 PROCEDURE — G8484 FLU IMMUNIZE NO ADMIN: HCPCS | Performed by: SURGERY

## 2022-11-15 NOTE — PROGRESS NOTES
Clare Velásquez gained 8 lbs over past 3 weeks. Visit completed via [facility] . Is pt eating at least 4 times everyday? Yes     Is pt eating a lean protein source with all meals and snacks? No    B - eggs and tea   Eats 3-4 snacks per day - oranges/grapes/melon   D - Yam sometimes with fish    Has pt decreased their portions using the plate method? Portions of protein and starchy veggies too large and veggies sometimes < 1 cup     Is pt choosing low fat/sugar free options? Yes     Is pt drinking at least 64 oz of clear liquids everyday? Yes     Has pt stopped drinking carbonation, caffeinated, and sugar sweetened beverages? Yes - water and tea (no caffeine) with splenda     Has pt sampled Unjury and/or Nectar protein? Yes     Has pt attended a support group? Completed 11/10/22     Participating in intentional exercise?  No intentional exercise but ADL's     Plan/Recommendations:   Include protein with all snacks and consistently at dinner   Decrease portions of protein/carbohydrate and increase veggies     Handouts: none     Amber Shahid RD, LD

## 2022-11-15 NOTE — PROGRESS NOTES
Chauncey May presented for her presurgical psychological evaluation on 11/15/2022. The evaluation consisted of a clinical interview, the Eating Habits Checklist, the Binge Eating Disorder Screener - 7 (BEDS-7), and the [place] [id number] (MBMD) administered by the provider. A Located within [facility]  was present during the evaluation to assist with the interview and psychological testing. Her adult daughter was also present for the evaluation at the patient's request.    Based on the evaluation, Chauncey May is considered to be an appropriate candidate for bariatric surgery from a psychological standpoint, provided she demonstrates the ability to effectively implement and sustain presurgical dietary recommendations. She exhibits mild anxiety secondary to her health and impending surgery, but otherwise denies any significant history of mental health concerns. Her medical record indicates she has been prescribed Prozac and Paxil; however, she states she was told the medications were for symptoms related to her menstrual cycle, and that she did not take them after another physician clarified that they were antidepressants. She reports no history of psychological intervention. She denies a history of suicidal ideation or suicide attempts, and has never been hospitalized psychiatrically. There is no indication of chemical abuse or dependence. She is a non-smoker. She denies alcohol or other recreational or illicit drug use. She denies a history of trauma. Chauncey May has never been diagnosed with an eating disorder, and her responses in the interview and on the Eating Habits Checklist and the BEDS-7 do not warrant a clinical diagnosis. She acknowledges eating in response to boredom. She states she is currently eating two meals and three snacks consistently throughout her day. She has eliminated caffeine and carbonated beverages from her diet.  She reports drinking an adequate daily intake of water. She endorsed self-punitive thoughts and feelings related to her weight and/or eating behaviors, and an all-or-nothing cognitive style and behavioral pattern in her dieting history that may serve to limit her perceived control over her eating urges. She denies binge eating or purging behavior. 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 She currently resides with her  of five years and her son and daughter. Amee Cervantes presents as well-dressed and neatly groomed. She ambulated with no visible gait disturbance and no assistive devices. She presents as rather guarded, but was relatively forthcoming in her responses. Affect was appropriate and reactive to content. Mood was reported as stable and free from significant mood or anxiety symptoms. Speech was articulate and within normal limits for rate and volume. There were no obvious cognitive deficits, nor gross impairment in attention or memory. She was oriented to person, place, and time. Thought processes were logical and coherent with no signs of psychosis. Insight and judgment were estimated to be intact. Amee Cervantes completed the MBMD as part of the evaluation. Her profile is valid. Results indicate eating as a possible problem area, and inactivity as a likely problem area at this time. She endorsed significantly more anxiety and emotional lability than the typical medical patient. She also endorsed significantly more interpersonal guardedness, which may adversely affect her willingness and/or ability to establish rapport with her providers and follow medical recommendations. Her profile is characterized by a public posture of assertiveness and a tendency to maintain a cool distance from others.  Her guardedness is probably due, in large part, to her own insecurities and the perceived risk of rejection or humiliation by others. Because she is accustomed to feeling strong and unassailable, she may struggle with the vulnerable role of medical patient. Her initial response to illness is likely to be denial, followed by irritability or blaming as her level of discomfort or debilitation increases. Providers should adopt a consistent, straightforward approach with her in order to win her respect and cooperation. Encouraging her to participate as much as possible in the planning and implementation of her treatment plan should also enhance compliance by drawing on her desire for self-mastery and control. Ms. Shah Patella endorsed more social isolation than the typical medical patient. Her profile suggests she may exhibit a strong emotional reaction to stressful medical procedures, and she endorsed a high degree of pain sensitivity. The primary coping assets reflected in her profile include spiritual jm and openness to receiving feedback and/or discussing matters pertaining to her health. Eliezer Sanches exhibited adequate awareness of the risks of bariatric surgery; however, she believes the benefits will outweigh the potential risks, as she hopes to minimize or reverse the effects of several medical concerns, including hypertension, hyperlipidemia, GERD, asthma, arthritis, back pain, and her status as pre-diabetic. She reports realistic expectations for the procedure, as her overall goals include improved health, reduced pain, and a goal weight of 150-160 lbs. She understands the need for permanent lifestyle change, including dietary modifications and a regular exercise program, and expressed willingness to implement the necessary changes. She expressed a commitment to comply with treatment recommendations through this office. She identified her family as a good support system in her efforts to meet her weight management goals.     In summary, Eliezer Sanches is considered to be an appropriate candidate for bariatric surgery from a psychological standpoint. She was encouraged to participate in support group activities through [facility], and to consult with our staff should she experience any significant post-surgical mood changes or have difficulty modifying her eating behaviors. Feel free to consult with me as needed with any further questions regarding this evaluation. Provider spent 29 minutes in test administration services. Provider spent 45 minutes in test evaluation services on 11/15/2022, and an additional 20 minutes on 11/29/2022.

## 2022-11-15 NOTE — PROGRESS NOTES
[facility]) Physicians   [employer]  Bill Rowland MD, [facility], [address] [facility], [address] [place] [id number] . Phone: [phone]  Fax: [phone]        HPI:     Clare Velásquez is a very pleasant 45 y.o. female with Body mass index is 38.62 kg/m². , Pre-Surgery for future weight loss. Pre-operative clearance and work up done. Working hard to keep good dietary habits as well level of activity. Patient denies any nausea, vomiting, fevers, chills, shortness of breath, chest pain, cough, constipation or difficulty urinating. Pain Assessment   Denies any abdominal pain       Past Medical History:   Diagnosis Date    Arthritis     Asthma     Hypertension     Obesity (BMI 30-39. 9)      Past Surgical History:   Procedure Laterality Date    UPPER GASTROINTESTINAL ENDOSCOPY N/A 6/24/2022    EGD BIOPSY performed by Cindi Primrose, MD at [address] [place] [address]     History reviewed. No pertinent family history. Social History     Tobacco Use    Smoking status: Never    Smokeless tobacco: Never   Substance Use Topics    Alcohol use: No     I counseled the patient on the importance of not smoking and risks of ETOH. No Known Allergies  Vitals:    11/15/22 1323   BP: 138/76   Pulse: 99   Resp: 18   SpO2: 99%   Weight: 225 lb (102.1 kg)   Height: 5' 4\" (1.626 m)       Body mass index is 38.62 kg/m². Current Outpatient Medications:     diclofenac sodium (VOLTAREN) 1 % GEL, Apply 2 g topically in the morning and 2 g at noon and 2 g in the evening and 2 g before bedtime. , Disp: 150 g, Rfl: 0    diphenhydrAMINE-APAP, sleep, (TYLENOL PM EXTRA STRENGTH PO), Take 2 tablets by mouth as needed, Disp: , Rfl:     omeprazole (PRILOSEC) 20 MG delayed release capsule, Take 1 capsule by mouth every morning (before breakfast), Disp: 90 capsule, Rfl: 1    metFORMIN (GLUCOPHAGE-XR) 500 MG extended release tablet, Take 1 tablet by mouth daily (with breakfast), Disp: 30 tablet, Rfl: 5    PARoxetine (PAXIL CR) 12.5 MG extended release tablet, Take 12.5 mg by mouth daily , Disp: , Rfl:     FLUoxetine (PROZAC) 10 MG capsule, TAKE 1 CAPSULE BY MOUTH ONCE DAILY, Disp: , Rfl:     NIFEdipine (ADALAT CC) 30 MG extended release tablet, Take 30 mg by mouth daily , Disp: , Rfl:     Magic Mouthwash (MIRACLE MOUTHWASH), Swish and spit 10 mLs 4 times daily as needed for Irritation or Pain Dispense as Magic Mouthwash. Please add Equal Parts: 60 mL Maalox, 60 mL Viscous Lidocaine, 60 mL Benadryl to 60mL of Carafate. 10 ml swish and spit or swallow as directed above., Disp: 240 mL, Rfl: 0    albuterol sulfate HFA (VENTOLIN HFA) 108 (90 Base) MCG/ACT inhaler, Inhale 2 puffs into the lungs 4 times daily as needed for Wheezing or Shortness of Breath, Disp: 18 g, Rfl: 0    naproxen (NAPROSYN) 500 MG tablet, Take 1 tablet by mouth in the morning and 1 tablet before bedtime. Do all this for 14 days. , Disp: 28 tablet, Rfl: 0      Review of Systems - History obtained from the patient  General ROS: negative  Psychological ROS: negative  Ophthalmic ROS: negative  Neurological ROS: negative  ENT ROS: negative  Allergy and Immunology ROS: negative  Hematological and Lymphatic ROS: negative  Endocrine ROS: negative  Breast ROS: negative  Respiratory ROS: negative  Cardiovascular ROS: negative  Gastrointestinal ROS:negative  Genito-Urinary ROS: negative  Musculoskeletal ROS: negative   Skin ROS: negative    Physical Exam   Vitals Reviewed   Constitutional: Patient is oriented to person, place, and time. Patient appears well-developed and well-nourished. Patient is active and cooperative. Non-toxic appearance. No distress. HENT:   Head: Normocephalic and atraumatic. Head is without abrasion and without laceration. Hair is normal.   Right Ear: External ear normal. No lacerations. No drainage, swelling . Left Ear: External ear normal. No lacerations. No drainage, swelling.    Mouth/Nose: Mask in Place   Eyes: Conjunctivae, EOM and lids are normal. Right eye exhibits no discharge. No foreign body present in the right eye. Left eye exhibits no discharge. No foreign body present in the left eye. No scleral icterus. Neck: Trachea normal and normal range of motion. No JVD present. Pulmonary/Chest: Effort normal. No accessory muscle usage or stridor. No apnea. No respiratory distress. Cardiovascular: Normal rate and no JVD. Abdominal: Normal appearance. Patient exhibits no distension. Abdomen is soft, obese, non tender. Musculoskeletal: Normal range of motion. Patient exhibits no edema. Neurological: Patient is alert and oriented to person, place, and time. Patient has normal strength. GCS eye subscore is 4. GCS verbal subscore is 5. GCS motor subscore is 6. Skin: Skin is warm and dry. No abrasion and no rash noted. Patient is not diaphoretic. No cyanosis or erythema. Psychiatric: Patient has a normal mood and affect. Speech is normal and behavior is normal. Cognition and memory are normal.       A/P       Carline Blue is 45 y.o. female, Body mass index is 38.62 kg/m². pre surgery. The patient underwent dietary counseling with registered dietician. I have reviewed, discussed and agree with the dietary plan. Patient is trying hard to keep good dietary and behavior modifications. Patient is monitoring portion sizes, food choices and liquid calories. Patient is trying to exercise regularly as much as possible. We discussed how her weight affects her overall health including:  Patient Active Problem List   Diagnosis    Lumbar spondylosis    Lumbar pain with radiation down both legs    Obesity (BMI 30-39. 9)    Morbid obesity with BMI of 40.0-44.9, adult (HCC)    Chronic GERD    Essential hypertension    Prediabetes    Ventral hernia without obstruction or gangrene    Mixed hyperlipidemia    Metabolic syndrome    and importance of weight loss to alleviate those co morbid conditions.    I encouraged the patient to continue exercise and keeping healthy eating habits. Discussed pre-op labs and work up till now. Also counseled the patient extensively on Surgery. The encounter today, included any number of the following: Bariatric Pre/Post operative work up/protocols, review of labs, imaging, provider notes, outside hospital records, performing examination/evaluation, counseling patient and/or family, ordering medications/tests, placing referrals and communication with referring physicians, coordination of care; discussing dietary plan/recall with the patient as well with registered dietitian and documentation in the EHR. Of note, the above was done during same day of the actual patient encounter. I did explain thoroughly to the patient that compliance with pre- and post op diet and other recommendations are integral part to improve the chances of successful weight loss and also not following it could end with serious health complications. Some strategies discussed today include but not limited to : 30/30/30 minutes rule, food diary, avoid fast food and packing/planing ahead, & increasing exercise. Also stressed to the patient importance of taking the multivitamins as instructed, otherwise risk significant complications. Meghann Joyner is a 45 y.o. female with Body mass index is 38.62 kg/m². ,  patient is deemed appropriate candidate for weight loss surgery by our multidisciplinary team.       Following The Metabolic and Bariatric Surgery Accreditation and Quality Improvement Program [facility]), and [employer] (ACS) recommendations, the Bariatric Surgical Risk/Benefit Calculator was used for Meghann Joyner. Report was discussed with Jessica Ventura and patient wishes to proceed with surgery, fully understanding the risks and benefits.     Of note, [facility] Bariatric Surgical Risk/Benefit Calculator estimates the chance of an unfavorable outcome (such as a complication or death), the chance of remission of weight-related comorbidities, and the patient's BMI, weight change, and percent total weight change after surgery. These quantities are estimated based upon information the patient gives the healthcare provider about prior health history. The estimates are calculated using data from a large number of patients who had a primary bariatric surgical procedure similar to the one the patient may have. Please note the risk percentages, remission percentages, BMI, weight change, and percent total weight change provided to you by the risk/benefit calculator are only estimates. These estimates only take certain information into account. There may be other factors that are not included in the estimate which may increase or decrease the risk of a complication, the chance of remission of a weight-related comorbidity, or the amount of weight the patient loses. These estimates are not a guarantee of results. A complication after surgery may happen even if the risk is low, a weight-related comorbidity may not go into remission even if the chances are high, and a patient may lose more or less weight than predicted. This information is not intended to replace the advice of a doctor or healthcare provider about the diagnosis, treatment, or potential outcomes. The Provider is not responsible for medical decisions that may be made by the patient based on the risk/benefit calculator estimates, since these estimates are provided for informational purposes. Patients should always consult their doctor or other health care provider before deciding on a treatment plan. Both open and laparoscopic approach were explained in details. Benefits and risks explained. Informed consent obtained. Risks including but not limited to; Infection, bleeding, gastric leak or obstruction, persistent nausea, vomiting, or reflux, injury to surrounding structures, risks of anesthesia, stricture, delayed gastric emptying, staple line leak, incisional hernia, malnutrition, vitamin deficiency, neurological complications, paralysis,  hair loss, and/ or Conversion to Open surgery may be necessary. Failure to lose or maintain weight loss, Gallstones or Kidney Stones, Deep Venous Thrombosis , pulmonary embolism and / or death. With obesity and/or Fatty liver , I may elect to perform liver core biopsy to have  Baseline idea on liver pathology if there is abnormal Liver Functions or if there is hepatomegaly &/or lesion, risks include but not limited to bile leak, liver hematoma or failure to diagnose a condition. I did explain thoroughly to the patient that compliance with pre- and post op diet and other recommendations are integral part to improve the chances of successful weight loss and also not following it could end with serious health complications. We discussed that our goal is to ameliorate the medical problems and not to obtain a specific body mass index. Patient understands the risks and benefits and wishes to proceed with the procedure. Also understands if BMI is lower than 40 without significant co morbid conditions, concerns for risks of surgery being somewhat higher over long run, however patient wants to proceed and fully understands the risks. Clearly BMI over 35 does impose very serious health risks as well chances of losing weight on diet only is very limited and sustaining weight loss is even less, thus surgery is certainly recommended for long term weight loss and better health overall given compliance. Obesity as a disease is considered a high risk to patients overall health and should therefore be considered a high risk disease state. Now with Covid-19 pandemic, CDC and health authorities does classify obese patients as vulnerable and high risk as well. Which makes weight loss a priority for improvement of their wellbeing and overall health.      I advised the patient that we can't guarantee final insurance approval.        I advised the patient that sometimes other indicated procedures may arise and the decision will be based on my assessment intraoperatively. Some may include include but not limited to:  [Ventral Hernia, Risks include but not limited to; infection, bleeding, injury to organs or nerves or vessels, PE, DVT, cardiac events, , persistent pain or symptoms, abscess, hernia recurrence or need for further procedures. However that will be determined intra-operatively, if not safe to proceed , then any additional procedures will have to be addressed later as primary goal is bariatric surgery.]      [ Hiatal Hernia, will attempt repair,  risks and benefits including but not limited to; hemothorax, pneumothorax, recurrence, difficulty swallowing, persistent symptoms, reflux and need for medications, esophageal, splenic, lung, heart, bowel, vagus nerve or gastric injuries. However that will be determined intra-operatively, if not safe to proceed, then any additional procedures will have to be addressed later as primary goal is bariatric surgery.]     Tanesha Cason understands that there may be a need to perform other urgent or necessary procedures that were unanticipated. Sandra Argueta consent to the performance of any additional procedures determined during the original procedure to be in their best interests and where delay might cause additional harm or put patient at risk for additional anesthesia risk for required by a second procedure and that will be determined by the surgeon. Sandra Argueta is aware if Weight gain occurs in pre-op period while on pre-operative diet or  non compliant with it , surgery will be canceled. Tanesha Cason understand that in relation to the COVID-19 pandemic and surgical procedures, patient have been given the opportunity to postpone   surgery until a  later date. Sandra Argueta have chosen to proceed with surgery knowing the risks associated with COVID-19.       Tanesha Cason was satisfied with the discussion we had and Rosemarie Bello had no further questions at end of visit and wants to proceed with surgery. 1- Pre-operative work up ordered for you(labs/x-rays/EKG). 2- Stop taking Blood thinners,one week before surgery. 3- F/U with PCP for pre-op Medical Clearance. 4- Plan for Laparoscopic Sleeve, possible other indicated procedures. Severe Obesity: Body mass index is 38.62 kg/m². [x] Continue to make dietary and lifestyle modifications. [x] Plan for Future laparoscopic sleeve gastrectomy. [x] Return for follow-up next month. Chronic GERD:   [x] Continue to make dietary and lifestyle modifications. [] Continue Omeprazole. [] Continue Famotidine. [x] Weight loss Recommended. Essential Hypertension:  [x] Continue to make dietary and lifestyle modifications. [x] Reviewed the importance of checking blood pressure. [x] Continue to follow up with their PCP for medication management and monitoring. [x] Weight loss Recommended. Prediabetes / Diabetes Mellitus Type II in Obese:   [x] Continue to make dietary and lifestyle modifications. [x] Reviewed the importance of checking blood sugars. [x] Continue to follow up with their PCP for medication management and monitoring. Hyperlipidemia:   [x] Continue to make dietary and lifestyle modifications. [x] Continue to follow up with their PCP for medication management and monitoring. [x] Weight loss Recommended. Metabolic Syndrome:   [x] Continue to make dietary and lifestyle modifications. [x] Continue to follow up with their PCP for Medical Management. [x] Weight loss Recommended. Please note that some or all of this report was generated using voice recognition software. Please notify me in case of any questions about the content of this document, as some errors in transcription may have occurred .

## 2022-11-16 ASSESSMENT — ENCOUNTER SYMPTOMS
GASTROINTESTINAL NEGATIVE: 1
EYES NEGATIVE: 1
RESPIRATORY NEGATIVE: 1
ALLERGIC/IMMUNOLOGIC NEGATIVE: 1
COUGH: 0
SHORTNESS OF BREATH: 0

## 2022-11-16 NOTE — PROGRESS NOTES
[facility]) Physicians   [employer] [facility] [employer]    Subjective:      Patient ID: Eloisa Birch is a 45 y.o. female    HPI    The patient is here for their bariatric surgery preoperative education group visit. She has made several attempts at weight loss in the past without success and now has been scheduled to have bariatric surgery on January 16, 2023 for future weight loss. She is working to change her dietary behaviors and lose weight to improve comorbid conditions such as hypertension, prediabetes, hyperlipidemia and GERD. Eloisa Birch is a very pleasant 45 y.o. female with Body mass index is 38.62 kg/m². Past Medical History:   Diagnosis Date    Arthritis     Asthma     Hypertension     Obesity (BMI 30-39. 9)      Past Surgical History:   Procedure Laterality Date    UPPER GASTROINTESTINAL ENDOSCOPY N/A 6/24/2022    EGD BIOPSY performed by Fabian Almonte MD at [address] [place] [address]     No family history on file. Social History     Tobacco Use    Smoking status: Never    Smokeless tobacco: Never   Substance Use Topics    Alcohol use: No     I counseled the patient on the importance of not smoking and risks of ETOH. No Known Allergies  Vitals:    12/08/22 1432   Weight: 225 lb (102.1 kg)   Height: 5' 4\" (1.626 m)     Body mass index is 38.62 kg/m². Current Outpatient Medications:     naproxen (NAPROSYN) 500 MG tablet, Take 1 tablet by mouth in the morning and 1 tablet before bedtime. Do all this for 14 days. , Disp: 28 tablet, Rfl: 0    diclofenac sodium (VOLTAREN) 1 % GEL, Apply 2 g topically in the morning and 2 g at noon and 2 g in the evening and 2 g before bedtime. , Disp: 150 g, Rfl: 0    diphenhydrAMINE-APAP, sleep, (TYLENOL PM EXTRA STRENGTH PO), Take 2 tablets by mouth as needed, Disp: , Rfl:     omeprazole (PRILOSEC) 20 MG delayed release capsule, Take 1 capsule by mouth every morning (before breakfast), Disp: 90 capsule, Rfl: 1    metFORMIN (GLUCOPHAGE-XR) 500 MG extended release tablet, Take 1 tablet by mouth daily (with breakfast), Disp: 30 tablet, Rfl: 5    PARoxetine (PAXIL CR) 12.5 MG extended release tablet, Take 12.5 mg by mouth daily , Disp: , Rfl:     FLUoxetine (PROZAC) 10 MG capsule, TAKE 1 CAPSULE BY MOUTH ONCE DAILY, Disp: , Rfl:     NIFEdipine (ADALAT CC) 30 MG extended release tablet, Take 30 mg by mouth daily , Disp: , Rfl:     Magic Mouthwash (MIRACLE MOUTHWASH), Swish and spit 10 mLs 4 times daily as needed for Irritation or Pain Dispense as Magic Mouthwash. Please add Equal Parts: 60 mL Maalox, 60 mL Viscous Lidocaine, 60 mL Benadryl to 60mL of Carafate.   10 ml swish and spit or swallow as directed above., Disp: 240 mL, Rfl: 0    albuterol sulfate HFA (VENTOLIN HFA) 108 (90 Base) MCG/ACT inhaler, Inhale 2 puffs into the lungs 4 times daily as needed for Wheezing or Shortness of Breath, Disp: 18 g, Rfl: 0    Lab Results   Component Value Date/Time    WBC 5.5 10/02/2021 04:27 PM    RBC 4.54 10/02/2021 04:27 PM    HGB 12.7 10/02/2021 04:27 PM    HCT 38.6 10/02/2021 04:27 PM    MCV 85.0 10/02/2021 04:27 PM    MCH 28.1 10/02/2021 04:27 PM    MCHC 33.0 10/02/2021 04:27 PM    MPV 7.8 10/02/2021 04:27 PM    NEUTOPHILPCT 50.8 10/02/2021 04:27 PM    LYMPHOPCT 37.8 10/02/2021 04:27 PM    MONOPCT 9.4 10/02/2021 04:27 PM    EOSRELPCT 1.5 10/02/2021 04:27 PM    BASOPCT 0.5 10/02/2021 04:27 PM    NEUTROABS 2.8 10/02/2021 04:27 PM    LYMPHSABS 2.1 10/02/2021 04:27 PM    MONOSABS 0.5 10/02/2021 04:27 PM    EOSABS 0.1 10/02/2021 04:27 PM     Lab Results   Component Value Date/Time     03/08/2022 08:46 AM    K 4.4 03/08/2022 08:46 AM    K 3.6 10/02/2021 04:27 PM     03/08/2022 08:46 AM    CO2 25 03/08/2022 08:46 AM    ANIONGAP 13 03/08/2022 08:46 AM    GLUCOSE 92 03/08/2022 08:46 AM    BUN 12 03/08/2022 08:46 AM    CREATININE 0.8 03/08/2022 08:46 AM    LABGLOM >60 03/08/2022 08:46 AM    GFRAA >60 03/08/2022 08:46 AM    CALCIUM 9.3 03/08/2022 08:46 AM    PROT 7.4 03/08/2022 08:46 AM    LABALBU 4.2 03/08/2022 08:46 AM    AGRATIO 1.3 03/08/2022 08:46 AM    BILITOT 0.3 03/08/2022 08:46 AM    ALKPHOS 78 03/08/2022 08:46 AM    ALT 20 03/08/2022 08:46 AM    AST 16 03/08/2022 08:46 AM    GLOB 4.0 10/02/2021 04:27 PM     Lab Results   Component Value Date/Time    CHOL 201 03/08/2022 08:46 AM    TRIG 46 03/08/2022 08:46 AM    HDL 85 03/08/2022 08:46 AM    LDLCALC 107 03/08/2022 08:46 AM    LABVLDL 9 03/08/2022 08:46 AM     Lab Results   Component Value Date/Time    TSHREFLEX 2.63 03/08/2022 08:46 AM     Lab Results   Component Value Date/Time    IRON 73 03/08/2022 08:46 AM    TIBC 401 03/08/2022 08:46 AM    LABIRON 18 03/08/2022 08:46 AM     Lab Results   Component Value Date/Time    UFSSZXBX10 1391 03/08/2022 08:46 AM    FOLATE 14.51 03/08/2022 08:46 AM     Lab Results   Component Value Date/Time    VITD25 30.0 03/08/2022 08:46 AM     Lab Results   Component Value Date/Time    LABA1C 6.4 10/25/2021 01:06 PM    .0 10/25/2021 01:06 PM     Review of Systems   Constitutional: Negative. Negative for chills, fatigue and fever. HENT: Negative. Eyes: Negative. Respiratory: Negative. Negative for cough and shortness of breath. Cardiovascular: Negative. Gastrointestinal: Negative. Endocrine: Negative. Genitourinary: Negative. Musculoskeletal: Negative. Skin: Negative. Allergic/Immunologic: Negative. Neurological: Negative. Hematological: Negative. Psychiatric/Behavioral: Negative. Objective:     Physical Exam  Vitals reviewed. Constitutional:       Appearance: Normal appearance. She is well-developed. She is obese. HENT:      Head: Normocephalic and atraumatic. Eyes:      Conjunctiva/sclera: Conjunctivae normal.      Pupils: Pupils are equal, round, and reactive to light. Pulmonary:      Effort: Pulmonary effort is normal.   Abdominal:      Palpations: Abdomen is soft. Musculoskeletal:         General: Normal range of motion.       Cervical back: Normal range of motion and neck supple. Skin:     General: Skin is warm and dry. Neurological:      Mental Status: She is alert and oriented to person, place, and time. Psychiatric:         Mood and Affect: Mood normal.         Behavior: Behavior normal.         Thought Content: Thought content normal.         Judgment: Judgment normal.     Assessment and Plan:   Patient is here for their preoperative education group visit for sleeve gastrectomy. The patient is down 0 lbs today. The patient's current Body mass index is 38.62 kg/m². (12/8/22). She is making good dietary and behavior modifications and is considered to be a good surgical candidate. Patient has a diagnosis of hypertension. Reviewed the importance of checking blood pressure preoperatively and postoperatively. In addition, following up with their PCP for medication adjustments as needed. Discussed the fact that they will be required to crush or open their medications for the first two weeks after surgery and reviewed those medications that can not be crushed. Patient has a diagnosis of prediabetes. Reviewed the importance of complying with post op diet. Patient has a diagnosis of hyperlipidemia. Discussed the benefits of weight loss and dietary changes on lipids and cholesterol. Discussed the fact that they will be required to crush or open their medications for the first two weeks after surgery and reviewed those medications that can not be crushed. Patient has a diagnosis of chronic GERD and takes a PPI. Discussed the benefits of weight loss and dietary changes on acid reflux. However, they will most likely need to continue their medication short term after surgery as they adjust to the new diet. Discussed the fact that they will be required to crush or open their medications for the first two weeks after surgery and reviewed those medications that can not be crushed.     Patient is postmenopausal so did not need to receive instruction that it is recommended to avoid pregnancy following bariatric surgery for at least 2 years to allow them to have stable weight loss and to help avoid increased risk of vitamin deficiencies and malnutrition. Patient received instructions from the registered dietitian in reference to the two week preoperative diet and the four phases of their postoperative diet. In addition I reviewed these instructions and stressed the importance of following these recommendations for their safety. Patient completed the preoperative class where they were provided with education related to their bariatric surgery, common surgical complications, medications preoperatively & postoperatively, special concerns related to bariatric surgery postoperatively, vitamin supplementation, patient agreement, PAT & scheduling, hospital course, wellness discovery program and what to do in the case of an emergency postoperatively. The dietitians reviewed all preoperative and postoperative diet instructions. Patient was given the opportunity to ask questions during the group visit and these questions were answered by myself and/or the dietitian. I spent a total of 45 minutes on the day of the visit and over half of that time was spent counseling the patient on proper dietary behaviors, exercise and surgery protocols.

## 2022-11-17 DIAGNOSIS — E88.81 METABOLIC SYNDROME: ICD-10-CM

## 2022-11-17 DIAGNOSIS — E66.01 MORBID OBESITY WITH BMI OF 40.0-44.9, ADULT (HCC): ICD-10-CM

## 2022-11-17 DIAGNOSIS — E78.2 MIXED HYPERLIPIDEMIA: ICD-10-CM

## 2022-11-17 DIAGNOSIS — R73.03 PREDIABETES: ICD-10-CM

## 2022-11-17 DIAGNOSIS — I10 ESSENTIAL HYPERTENSION: ICD-10-CM

## 2022-11-17 DIAGNOSIS — K21.9 CHRONIC GERD: ICD-10-CM

## 2022-11-17 LAB
AMPHETAMINE SCREEN, URINE: POSITIVE
BARBITURATE SCREEN URINE: ABNORMAL
BENZODIAZEPINE SCREEN, URINE: ABNORMAL
CANNABINOID SCREEN URINE: ABNORMAL
COCAINE METABOLITE SCREEN URINE: ABNORMAL
ETHANOL: NORMAL MG/DL (ref 0–0.08)
FENTANYL SCREEN, URINE: ABNORMAL
HCG(URINE) PREGNANCY TEST: NEGATIVE
Lab: ABNORMAL
METHADONE SCREEN, URINE: ABNORMAL
OPIATE SCREEN URINE: ABNORMAL
OXYCODONE URINE: ABNORMAL
PH UA: 7.5
PHENCYCLIDINE SCREEN URINE: ABNORMAL

## 2022-11-18 DIAGNOSIS — Z01.818 PREOP TESTING: Primary | ICD-10-CM

## 2022-11-21 LAB
COTININE: <5 NG/ML
NICOTINE: <5 NG/ML

## 2022-12-01 ENCOUNTER — OFFICE VISIT (OUTPATIENT)
Dept: ORTHOPEDIC SURGERY | Age: 52
End: 2022-12-01

## 2022-12-01 VITALS — WEIGHT: 225 LBS | HEIGHT: 64 IN | BODY MASS INDEX: 38.41 KG/M2

## 2022-12-01 DIAGNOSIS — M17.11 PRIMARY OSTEOARTHRITIS OF RIGHT KNEE: Primary | ICD-10-CM

## 2022-12-01 RX ORDER — TRIAMCINOLONE ACETONIDE 40 MG/ML
80 INJECTION, SUSPENSION INTRA-ARTICULAR; INTRAMUSCULAR ONCE
Status: COMPLETED | OUTPATIENT
Start: 2022-12-01 | End: 2022-12-01

## 2022-12-01 RX ORDER — BUPIVACAINE HYDROCHLORIDE 2.5 MG/ML
3 INJECTION, SOLUTION INFILTRATION; PERINEURAL ONCE
Status: COMPLETED | OUTPATIENT
Start: 2022-12-01 | End: 2022-12-01

## 2022-12-01 RX ADMIN — TRIAMCINOLONE ACETONIDE 80 MG: 40 INJECTION, SUSPENSION INTRA-ARTICULAR; INTRAMUSCULAR at 11:42

## 2022-12-01 RX ADMIN — BUPIVACAINE HYDROCHLORIDE 7.5 MG: 2.5 INJECTION, SOLUTION INFILTRATION; PERINEURAL at 11:42

## 2022-12-01 NOTE — PROGRESS NOTES
Ashley Avendaño  [id number]  December 1, 2022    Chief Complaint   Patient presents with    Follow-up     Bilat knee       History: The patient is a 56-year-old female who is here for evaluation of both knees. She is mostly concerned about her right knee. She last received the Visco supplement injections on 9/22. She was doing rather well. She had a fall 2 weeks ago when trying to catch the bus. She landed directly onto the right knee. She now rates her right knee pain as 10/10. She is scheduled to have a lap band procedure in January. She does take ibuprofen regularly. She has difficulty getting up from a seated position. Does report night pain in the right knee. The patient's  past medical history, medications, allergies,  family history, social history, and have been reviewed, and dated and are recorded in the chart. Pertinent items are noted in HPI. Review of systems reviewed from Pertinent History Form dated on 6/23/2021 and available in the patient's chart under the Media tab. Vitals:  Ht 5' 4\" (1.626 m)   Wt 225 lb (102.1 kg)   LMP 06/10/2020   BMI 38.62 kg/m²     Physical: Ms. Ashley Avendaño appears well, she is in no apparent distress, she demonstrates appropriate mood & affect. She is alert and oriented to person, place and time. Examination of the bilateral lower extremity reveals no pain with range of motion of the hip. She has generalized tenderness to palpation about the joint line of the bilateral knee. Range of motion is from 0 degrees to 110 degrees bilaterally. Strength is 4+ to 5/5 for all muscle groups about the bilateral knee. There is patellofemoral crepitus with range of motion of the bilateral knee. Varus and valgus stressing of the knees reveals no evidence of instability. There is no clear evidence of  effusion in the knees. Anterior drawer and Lachman are negative bilaterally.    Examination of the skin reveals no rashes, ulceration, or lesion, bilaterally in the lower extremities. Sensation to both lower extremities is grossly intact. Exam of both feet reveals pedal pulses intact and brisk cap refill. Patient is able to dorsiflex and wiggle all toes. Deep tendon reflexes of the lower extremities are normal and symmetric. The patient has mild anterior right knee swelling. She has diffuse tenderness to palpation over the anterior aspect of the right knee. X-rays: We again reviewed her knee x-rays from the past and she has bilateral knee osteoarthritis. The changes are most severe medially. Impression: Bilateral knee osteoarthritis #2 right knee contusion    Plan: The patient was explained the risks, benefits and alternatives to injection. The patient understood the risks and benefits and agreed to proceed. At this time, the right knee was injected under sterile conditions. After a Betadine prep of the joint, 3 cc of 0.25% Marcaine and 2 cc of 40 mg Kenalog were injected into the knee. The patient tolerated the injection rather well. The patient was instructed to follow up for any signs of infection. Natural history and expected course discussed. Questions answered. Reduction in offending activity. OTC analgesics as needed. The patient will follow up with me in 3 months. The patient will proceed with the Lap-Band procedure in January. Hopefully, she does lose a significant amount of weight. She may ultimately require total knee arthroplasty.

## 2022-12-06 NOTE — PROGRESS NOTES
Patient Name:   Arti Chavira      Type of Surgery:  Sleeve         Date of Surgery:  1/16/23       Start Pre-Op Diet On:  1/3/23        Start Clear Liquids On:  1/15/23         NPO after midnight the night before your surgery! Take morning medications with a small amount of water.     NOTES:_______________________________________  ______________________________________________

## 2022-12-08 ENCOUNTER — TELEPHONE (OUTPATIENT)
Dept: BARIATRICS/WEIGHT MGMT | Age: 52
End: 2022-12-08

## 2022-12-08 ENCOUNTER — OFFICE VISIT (OUTPATIENT)
Dept: BARIATRICS/WEIGHT MGMT | Age: 52
End: 2022-12-08

## 2022-12-08 VITALS — BODY MASS INDEX: 38.41 KG/M2 | WEIGHT: 225 LBS | HEIGHT: 64 IN

## 2022-12-08 DIAGNOSIS — R73.03 PREDIABETES: ICD-10-CM

## 2022-12-08 DIAGNOSIS — K21.9 CHRONIC GERD: ICD-10-CM

## 2022-12-08 DIAGNOSIS — E66.9 OBESITY (BMI 30-39.9): ICD-10-CM

## 2022-12-08 DIAGNOSIS — E78.2 MIXED HYPERLIPIDEMIA: ICD-10-CM

## 2022-12-08 DIAGNOSIS — I10 ESSENTIAL HYPERTENSION: Primary | ICD-10-CM

## 2022-12-08 DIAGNOSIS — E88.81 METABOLIC SYNDROME: ICD-10-CM

## 2022-12-08 NOTE — TELEPHONE ENCOUNTER
Put in refund for 12 grab and go today. Pt paid cash when purchased told her she would get a check.   $24.00 total

## 2022-12-15 ENCOUNTER — TELEPHONE (OUTPATIENT)
Dept: BARIATRICS/WEIGHT MGMT | Age: 52
End: 2022-12-15

## 2022-12-15 NOTE — TELEPHONE ENCOUNTER
Patient was supposed to have Amphetamine screen repeated,  was positive on 11/17. Patient was left a VM reminding her. I called again and spoke with daughter on 12/7/22 and she was reminded again on 12/8 when she came to pre op class by Khadijah. Daughter indicated she was aware and would get done.

## 2022-12-25 ENCOUNTER — HOSPITAL ENCOUNTER (EMERGENCY)
Age: 52
Discharge: HOME | End: 2022-12-25
Attending: EMERGENCY MEDICINE
Payer: COMMERCIAL

## 2022-12-25 ENCOUNTER — APPOINTMENT (OUTPATIENT)
Dept: CT IMAGING | Age: 52
End: 2022-12-25
Payer: COMMERCIAL

## 2022-12-25 VITALS
BODY MASS INDEX: 36.8 KG/M2 | DIASTOLIC BLOOD PRESSURE: 90 MMHG | RESPIRATION RATE: 16 BRPM | HEIGHT: 65 IN | WEIGHT: 220.9 LBS | SYSTOLIC BLOOD PRESSURE: 156 MMHG | OXYGEN SATURATION: 100 % | HEART RATE: 108 BPM | TEMPERATURE: 98.5 F

## 2022-12-25 DIAGNOSIS — J36 PERITONSILLAR ABSCESS: Primary | ICD-10-CM

## 2022-12-25 LAB
ANION GAP SERPL CALCULATED.3IONS-SCNC: 11 MMOL/L (ref 3–16)
BASOPHILS ABSOLUTE: 0 K/UL (ref 0–0.2)
BASOPHILS RELATIVE PERCENT: 0.4 %
BUN BLDV-MCNC: 14 MG/DL (ref 7–20)
CALCIUM SERPL-MCNC: 9.6 MG/DL (ref 8.3–10.6)
CHLORIDE BLD-SCNC: 101 MMOL/L (ref 99–110)
CO2: 26 MMOL/L (ref 21–32)
CREAT SERPL-MCNC: 0.8 MG/DL (ref 0.6–1.1)
EOSINOPHILS ABSOLUTE: 0 K/UL (ref 0–0.6)
EOSINOPHILS RELATIVE PERCENT: 0.5 %
GFR SERPL CREATININE-BSD FRML MDRD: >60 ML/MIN/{1.73_M2}
GLUCOSE BLD-MCNC: 142 MG/DL (ref 70–99)
HCT VFR BLD CALC: 38.6 % (ref 36–48)
HEMOGLOBIN: 12.7 G/DL (ref 12–16)
LACTIC ACID: 0.9 MMOL/L (ref 0.4–2)
LYMPHOCYTES ABSOLUTE: 1.5 K/UL (ref 1–5.1)
LYMPHOCYTES RELATIVE PERCENT: 18.3 %
MCH RBC QN AUTO: 28.8 PG (ref 26–34)
MCHC RBC AUTO-ENTMCNC: 33 G/DL (ref 31–36)
MCV RBC AUTO: 87.1 FL (ref 80–100)
MONOCYTES ABSOLUTE: 0.6 K/UL (ref 0–1.3)
MONOCYTES RELATIVE PERCENT: 7.3 %
NEUTROPHILS ABSOLUTE: 6.2 K/UL (ref 1.7–7.7)
NEUTROPHILS RELATIVE PERCENT: 73.5 %
PDW BLD-RTO: 15.4 % (ref 12.4–15.4)
PLATELET # BLD: 310 K/UL (ref 135–450)
PMV BLD AUTO: 7.5 FL (ref 5–10.5)
POTASSIUM REFLEX MAGNESIUM: 4.2 MMOL/L (ref 3.5–5.1)
RBC # BLD: 4.43 M/UL (ref 4–5.2)
S PYO AG THROAT QL: NEGATIVE
SODIUM BLD-SCNC: 138 MMOL/L (ref 136–145)
WBC # BLD: 8.4 K/UL (ref 4–11)

## 2022-12-25 PROCEDURE — 99285 EMERGENCY DEPT VISIT HI MDM: CPT

## 2022-12-25 PROCEDURE — 36415 COLL VENOUS BLD VENIPUNCTURE: CPT

## 2022-12-25 PROCEDURE — 70491 CT SOFT TISSUE NECK W/DYE: CPT

## 2022-12-25 PROCEDURE — 6360000002 HC RX W HCPCS: Performed by: EMERGENCY MEDICINE

## 2022-12-25 PROCEDURE — 96365 THER/PROPH/DIAG IV INF INIT: CPT

## 2022-12-25 PROCEDURE — 83605 ASSAY OF LACTIC ACID: CPT

## 2022-12-25 PROCEDURE — 2580000003 HC RX 258: Performed by: EMERGENCY MEDICINE

## 2022-12-25 PROCEDURE — 87077 CULTURE AEROBIC IDENTIFY: CPT

## 2022-12-25 PROCEDURE — 87880 STREP A ASSAY W/OPTIC: CPT

## 2022-12-25 PROCEDURE — 6360000004 HC RX CONTRAST MEDICATION: Performed by: EMERGENCY MEDICINE

## 2022-12-25 PROCEDURE — 87081 CULTURE SCREEN ONLY: CPT

## 2022-12-25 PROCEDURE — 85025 COMPLETE CBC W/AUTO DIFF WBC: CPT

## 2022-12-25 PROCEDURE — 96375 TX/PRO/DX INJ NEW DRUG ADDON: CPT

## 2022-12-25 PROCEDURE — 80048 BASIC METABOLIC PNL TOTAL CA: CPT

## 2022-12-25 RX ORDER — DEXAMETHASONE SODIUM PHOSPHATE 4 MG/ML
6 INJECTION, SOLUTION INTRA-ARTICULAR; INTRALESIONAL; INTRAMUSCULAR; INTRAVENOUS; SOFT TISSUE ONCE
Status: COMPLETED | OUTPATIENT
Start: 2022-12-25 | End: 2022-12-25

## 2022-12-25 RX ORDER — AMOXICILLIN AND CLAVULANATE POTASSIUM 875; 125 MG/1; MG/1
1 TABLET, FILM COATED ORAL 2 TIMES DAILY
Qty: 14 TABLET | Refills: 0 | Status: SHIPPED | OUTPATIENT
Start: 2022-12-25 | End: 2023-01-01

## 2022-12-25 RX ORDER — 0.9 % SODIUM CHLORIDE 0.9 %
1000 INTRAVENOUS SOLUTION INTRAVENOUS ONCE
Status: COMPLETED | OUTPATIENT
Start: 2022-12-25 | End: 2022-12-25

## 2022-12-25 RX ORDER — OXYCODONE HYDROCHLORIDE AND ACETAMINOPHEN 5; 325 MG/1; MG/1
1 TABLET ORAL EVERY 6 HOURS PRN
Qty: 12 TABLET | Refills: 0 | Status: SHIPPED | OUTPATIENT
Start: 2022-12-25 | End: 2022-12-30

## 2022-12-25 RX ORDER — PREDNISONE 20 MG/1
20 TABLET ORAL DAILY
Qty: 5 TABLET | Refills: 0 | Status: SHIPPED | OUTPATIENT
Start: 2022-12-25 | End: 2022-12-30

## 2022-12-25 RX ORDER — FENTANYL CITRATE 50 UG/ML
25 INJECTION, SOLUTION INTRAMUSCULAR; INTRAVENOUS ONCE
Status: COMPLETED | OUTPATIENT
Start: 2022-12-25 | End: 2022-12-25

## 2022-12-25 RX ADMIN — SODIUM CHLORIDE 1500 MG: 900 INJECTION INTRAVENOUS at 15:25

## 2022-12-25 RX ADMIN — SODIUM CHLORIDE 1000 ML: 9 INJECTION, SOLUTION INTRAVENOUS at 16:59

## 2022-12-25 RX ADMIN — SODIUM CHLORIDE 1000 ML: 9 INJECTION, SOLUTION INTRAVENOUS at 15:18

## 2022-12-25 RX ADMIN — IOPAMIDOL 100 ML: 755 INJECTION, SOLUTION INTRAVENOUS at 15:52

## 2022-12-25 RX ADMIN — FENTANYL CITRATE 25 MCG: 50 INJECTION, SOLUTION INTRAMUSCULAR; INTRAVENOUS at 15:14

## 2022-12-25 RX ADMIN — DEXAMETHASONE SODIUM PHOSPHATE 6 MG: 4 INJECTION, SOLUTION INTRAMUSCULAR; INTRAVENOUS at 15:14

## 2022-12-25 ASSESSMENT — ENCOUNTER SYMPTOMS
SORE THROAT: 1
SHORTNESS OF BREATH: 0
ABDOMINAL PAIN: 0
VOICE CHANGE: 0

## 2022-12-25 ASSESSMENT — PAIN - FUNCTIONAL ASSESSMENT: PAIN_FUNCTIONAL_ASSESSMENT: 0-10

## 2022-12-25 ASSESSMENT — PAIN SCALES - GENERAL
PAINLEVEL_OUTOF10: 2
PAINLEVEL_OUTOF10: 8

## 2022-12-25 NOTE — ED NOTES
Patient presents to the ED with sore throat. She states it's been going on for about 2 weeks. Denies any sick contacts. States last night it just became too much. Patient is tachycardic in the 120s, denies chest pain or dizziness.       Essence Johnson, RN  12/25/22 7422

## 2022-12-25 NOTE — ED NOTES
Patient DCed from ED at this time. Discussed AVS, follow up, and scripts. She and her son both verbalized understanding. Reinforced to patient that should symptom persist or worsen to return to the ED. She verbalizes understanding. IV DCed. Patient's son to drive her home. She ambulated out of ED on her own with a steady gait.       Concepción Stover RN  12/25/22 0722

## 2022-12-25 NOTE — ED NOTES
Patient does endorse relief, states her pain is down to 2/10. HR is 108.       Josie Thornton RN  12/25/22 4728

## 2022-12-25 NOTE — ED PROVIDER NOTES
[facility] ENCOUNTER      Pt Name: Frank Lara  MRN: [id number]  [place] 1970  Date of evaluation: 12/25/2022  Provider: June Linares MD    [address] [place] [address]       Chief Complaint   Patient presents with    Pharyngitis     X 2 weeks           HISTORY OF PRESENT ILLNESS   (Location/Symptom, Timing/Onset, Context/Setting, Quality, Duration, Modifying Factors, Severity)  Note limiting factors. Frank Lara is a 45 y.o. female who presents to the emergency department with sore throat for 2 weeks. HPI    This is a 47-year-old -American female with migratory past medical history who presents with a sore throat for about 2 weeks she describes throat is dull achy worse with swallowing somewhat relieved by rest.  Has had subjective fevers and chills and its unclear whether she has had medical contact for this issue. It is became worse to the point where she sought medical attention at this time. She denies any wheezing stridor no voice changes is able to tolerate some limited p.o. fluids    Nursing Notes were reviewed. REVIEW OF SYSTEMS    (2-9 systems for level 4, 10 or more for level 5)     Review of Systems   Constitutional:  Positive for chills and fever. HENT:  Positive for sore throat. Negative for ear pain and voice change. Respiratory:  Negative for shortness of breath. Cardiovascular:  Negative for chest pain. Gastrointestinal:  Negative for abdominal pain. All other systems reviewed and are negative. Except as noted above the remainder of the review of systems was reviewed and negative. PAST MEDICAL HISTORY     Past Medical History:   Diagnosis Date    Arthritis     Asthma     Hypertension     Obesity (BMI 30-39. 9)          SURGICAL HISTORY       Past Surgical History:   Procedure Laterality Date    UPPER GASTROINTESTINAL ENDOSCOPY N/A 6/24/2022    EGD BIOPSY performed by Gene South MD at [address] [place] [address] CURRENT MEDICATIONS       Previous Medications    ALBUTEROL SULFATE HFA (VENTOLIN HFA) 108 (90 BASE) MCG/ACT INHALER    Inhale 2 puffs into the lungs 4 times daily as needed for Wheezing or Shortness of Breath    DICLOFENAC SODIUM (VOLTAREN) 1 % GEL    Apply 2 g topically in the morning and 2 g at noon and 2 g in the evening and 2 g before bedtime. DIPHENHYDRAMINE-APAP, SLEEP, (TYLENOL PM EXTRA STRENGTH PO)    Take 2 tablets by mouth as needed    FLUOXETINE (PROZAC) 10 MG CAPSULE    TAKE 1 CAPSULE BY MOUTH ONCE DAILY    MAGIC MOUTHWASH (MIRACLE MOUTHWASH)    Swish and spit 10 mLs 4 times daily as needed for Irritation or Pain Dispense as Magic Mouthwash. Please add Equal Parts: 60 mL Maalox, 60 mL Viscous Lidocaine, 60 mL Benadryl to 60mL of Carafate. 10 ml swish and spit or swallow as directed above. METFORMIN (GLUCOPHAGE-XR) 500 MG EXTENDED RELEASE TABLET    Take 1 tablet by mouth daily (with breakfast)    NAPROXEN (NAPROSYN) 500 MG TABLET    Take 1 tablet by mouth in the morning and 1 tablet before bedtime. Do all this for 14 days. NIFEDIPINE (ADALAT CC) 30 MG EXTENDED RELEASE TABLET    Take 30 mg by mouth daily     OMEPRAZOLE (PRILOSEC) 20 MG DELAYED RELEASE CAPSULE    Take 1 capsule by mouth every morning (before breakfast)    PAROXETINE (PAXIL CR) 12.5 MG EXTENDED RELEASE TABLET    Take 12.5 mg by mouth daily        ALLERGIES     Patient has no known allergies. FAMILY HISTORY     No family history on file.        SOCIAL HISTORY       Social History     Socioeconomic History    Marital status: Single   Tobacco Use    Smoking status: Never    Smokeless tobacco: Never   Vaping Use    Vaping Use: Never used   Substance and Sexual Activity    Alcohol use: No    Drug use: No   Social History Narrative    ** Merged History Encounter **            SCREENINGS         Henrique Coma Scale  Eye Opening: Spontaneous  Best Verbal Response: Oriented  Best Motor Response: Obeys commands  Henrique Coma Scale Score: 15                     CIWA Assessment  BP: (!) 176/103  Heart Rate: (!) 108                 PHYSICAL EXAM    (up to 7 for level 4, 8 or more for level 5)     ED Triage Vitals [12/25/22 1434]   BP Temp Temp Source Heart Rate Resp SpO2 Height Weight   (!) 154/99 98.5 °F (36.9 °C) Oral (!) 126 23 99 % 5' 5\" (1.651 m) 220 lb 14.4 oz (100.2 kg)       Physical Exam  Constitutional:       General: She is not in acute distress. Appearance: She is well-developed. HENT:      Head: Normocephalic and atraumatic. Right Ear: Tympanic membrane and ear canal normal.      Left Ear: Tympanic membrane and ear canal normal.      Mouth/Throat:      Mouth: Mucous membranes are moist.      Pharynx: Posterior oropharyngeal erythema present. No oropharyngeal exudate. Tonsils: No tonsillar exudate or tonsillar abscesses. 2+ on the right. 2+ on the left. Eyes:      Conjunctiva/sclera: Conjunctivae normal.      Pupils: Pupils are equal, round, and reactive to light. Neck:      Thyroid: No thyromegaly. Cardiovascular:      Rate and Rhythm: Tachycardia present. Heart sounds: No murmur heard. No friction rub. No gallop. Pulmonary:      Effort: Pulmonary effort is normal.      Breath sounds: Normal breath sounds. Abdominal:      Palpations: Abdomen is soft. Tenderness: There is no abdominal tenderness. Musculoskeletal:      Cervical back: Normal range of motion. Skin:     General: Skin is warm and dry. Capillary Refill: Capillary refill takes less than 2 seconds. Neurological:      Mental Status: She is alert and oriented to person, place, and time.    Psychiatric:         Mood and Affect: Mood normal.       DIAGNOSTIC RESULTS     EKG: All EKG's are interpreted by the Emergency Department Physician who either signs or Co-signs this chart in the absence of a cardiologist.        RADIOLOGY:   Non-plain film images such as CT, Ultrasound and MRI are read by the radiologist. Plain radiographic images are visualized and preliminarily interpreted by the emergency physician with the below findings:        Interpretation per the Radiologist below, if available at the time of this note:    CT SOFT TISSUE NECK W CONTRAST   Final Result   There is hyperenhancement of the right palatine tonsil suggestive of right   palatine tonsillitis. There is a subtle hypodense area with rim of   enhancement within the right palatine tonsil measuring 8 x 4 mm which may   represent a small tonsillar abscess collection. .      There is also associated hyperenhancement of right adenoid tonsillar tissue   likely suggestive of right-sided multiple adenitis. .      Reactive cervical lymphadenopathy. RECOMMENDATIONS:   Unavailable               ED BEDSIDE ULTRASOUND:   Performed by ED Physician - none    LABS:  Labs Reviewed   BASIC METABOLIC PANEL W/ REFLEX TO MG FOR LOW K - Abnormal; Notable for the following components:       Result Value    Glucose 142 (*)     All other components within normal limits   STREP SCREEN GROUP A THROAT   CULTURE, BETA STREP CONFIRM PLATES   CBC WITH AUTO DIFFERENTIAL   LACTIC ACID       All other labs were within normal range or not returned as of this dictation. EMERGENCY DEPARTMENT COURSE and DIFFERENTIAL DIAGNOSIS/MDM:   Vitals:    Vitals:    12/25/22 1530 12/25/22 1623 12/25/22 1630 12/25/22 1645   BP: (!) 154/94  (!) 170/107 (!) 176/103   Pulse: (!) 120 (!) 108 (!) 110 (!) 108   Resp: 18 16 14 18   Temp:       TempSrc:       SpO2: 100% 98% 100% 100%   Weight:       Height:           The above history and physical exam were performed. I reviewed her past medical past surgical social family history. 12 point review of system was performed is negative as otherwise noted. She was given copious IV fluids in the emergency department along with Unasyn and Decadron with significant improvement in her symptomatology. Her CT showed a very small right palatine tonsil abscess.   I discussed the case with ENT Dr. Beatriz Rubi kindly returned my call, she will follow-up with him on an outpatient basis on appropriate antibiotics and some steroids. MDM    Consider the diagnosis of PTA versus RPA versus strep pharyngitis versus acute viral pharyngitis. She has a very small PTA which according to literature and is discussed with ENT is not amenable to drainage and should almost always resolve with antibiotics and steroids. It is also notable that this process been going on for 2 weeks without any significant worsening or significant airway involvement. She will be able to be discharged home on appropriate antibiotics and medications and will follow-up with ENT on outpatient basis with appropriate return precautions. REASSESSMENT          CRITICAL CARE TIME   Total Critical Care time was 43 minutes, excluding separately reportable procedures. There was a high probability of clinically significant/life threatening deterioration in the patient's condition which required my urgent intervention. For PTA and tachycardia    CONSULTS:  None    PROCEDURES:  Unless otherwise noted below, none     Procedures        FINAL IMPRESSION      1. Peritonsillar abscess          DISPOSITION/PLAN   DISPOSITION Discharge - Pending Orders Complete 12/25/2022 04:54:35 PM      PATIENT REFERRED TO:  Sallie Shetty MD  [address] [place] [address]  [phone]    Schedule an appointment as soon as possible for a visit in 2 days      DISCHARGE MEDICATIONS:  New Prescriptions    AMOXICILLIN-CLAVULANATE (AUGMENTIN) 875-125 MG PER TABLET    Take 1 tablet by mouth 2 times daily for 7 days    OXYCODONE-ACETAMINOPHEN (PERCOCET) 5-325 MG PER TABLET    Take 1 tablet by mouth every 6 hours as needed for Pain for up to 5 days. Intended supply: 3 days.  Take lowest dose possible to manage pain Max Daily Amount: 4 tablets    PREDNISONE (DELTASONE) 20 MG TABLET    Take 1 tablet by mouth daily for 5 days     Controlled Substances Monitoring:     RX Monitoring 8/30/2021   Periodic Controlled Substance Monitoring Possible medication side effects, risk of tolerance/dependence & alternative treatments discussed. ;No signs of potential drug abuse or diversion identified.        (Please note that portions of this note were completed with a voice recognition program.  Efforts were made to edit the dictations but occasionally words are mis-transcribed.)    Wally Villagomez MD (electronically signed)  Attending Emergency Physician          Wally Villagomez MD  01/02/23 1631

## 2022-12-27 LAB
ORGANISM: ABNORMAL
S PYO THROAT QL CULT: ABNORMAL
S PYO THROAT QL CULT: ABNORMAL

## 2022-12-28 ENCOUNTER — TELEPHONE (OUTPATIENT)
Dept: BARIATRICS/WEIGHT MGMT | Age: 52
End: 2022-12-28

## 2022-12-28 NOTE — TELEPHONE ENCOUNTER
Patient: Debbie Ferreira  Account Number:  [id number]  System: Epic  Date(s) of Service: 11/15/2022  Facility: [facility]  Services Provided: products  Caller overall Concern: Patient bought 12 grab and go an returned - 12 @ $2.00 = $24.00  please     Updates: Refund was issued on 12/13/2022  Outcome: Completed

## 2022-12-29 ENCOUNTER — OFFICE VISIT (OUTPATIENT)
Dept: ENT CLINIC | Age: 52
End: 2022-12-29
Payer: COMMERCIAL

## 2022-12-29 VITALS — BODY MASS INDEX: 36.65 KG/M2 | HEIGHT: 65 IN | TEMPERATURE: 98 F | RESPIRATION RATE: 16 BRPM | WEIGHT: 220 LBS

## 2022-12-29 DIAGNOSIS — R05.1 ACUTE COUGH: ICD-10-CM

## 2022-12-29 DIAGNOSIS — J02.9 PHARYNGITIS, UNSPECIFIED ETIOLOGY: Primary | ICD-10-CM

## 2022-12-29 PROCEDURE — 1036F TOBACCO NON-USER: CPT | Performed by: OTOLARYNGOLOGY

## 2022-12-29 PROCEDURE — 3017F COLORECTAL CA SCREEN DOC REV: CPT | Performed by: OTOLARYNGOLOGY

## 2022-12-29 PROCEDURE — G8417 CALC BMI ABV UP PARAM F/U: HCPCS | Performed by: OTOLARYNGOLOGY

## 2022-12-29 PROCEDURE — G8484 FLU IMMUNIZE NO ADMIN: HCPCS | Performed by: OTOLARYNGOLOGY

## 2022-12-29 PROCEDURE — 99203 OFFICE O/P NEW LOW 30 MIN: CPT | Performed by: OTOLARYNGOLOGY

## 2022-12-29 PROCEDURE — G8427 DOCREV CUR MEDS BY ELIG CLIN: HCPCS | Performed by: OTOLARYNGOLOGY

## 2022-12-29 RX ORDER — BENZONATATE 100 MG/1
100-200 CAPSULE ORAL 3 TIMES DAILY PRN
Qty: 60 CAPSULE | Refills: 0 | Status: SHIPPED | OUTPATIENT
Start: 2022-12-29 | End: 2023-01-05

## 2022-12-29 RX ORDER — ONDANSETRON 4 MG/1
4 TABLET, FILM COATED ORAL 3 TIMES DAILY PRN
Qty: 15 TABLET | Refills: 0 | Status: SHIPPED | OUTPATIENT
Start: 2022-12-29

## 2022-12-29 RX ORDER — ACETAMINOPHEN 500 MG
500 TABLET ORAL 4 TIMES DAILY PRN
Qty: 120 TABLET | Refills: 0 | Status: SHIPPED | OUTPATIENT
Start: 2022-12-29

## 2022-12-29 ASSESSMENT — ENCOUNTER SYMPTOMS
TROUBLE SWALLOWING: 1
SHORTNESS OF BREATH: 0
SORE THROAT: 1
WHEEZING: 0
ABDOMINAL PAIN: 0

## 2022-12-29 NOTE — PROGRESS NOTES
Angela Morales [address], [address], [address] [place] [address]  P: [phone]       Patient     Clare Velásquez  1970    Chief Concern     Chief Complaint   Patient presents with    Other     ED follow up for tonsil stone       Assessment and Plan      Diagnosis Orders   1. Pharyngitis, unspecified etiology  ondansetron (ZOFRAN) 4 MG tablet    acetaminophen (TYLENOL) 500 MG tablet      2. Acute cough  benzonatate (TESSALON) 100 MG capsule        Patient with recent episode of acute pharyngitis, possible intratonsillar abscess-no hot potato voice, no trismus, able to range neck without issue, continues to have sore throat but is improving. Concern for cough, with right reproducible chest pain, may be musculoskeletal in origin, will prescribe Zofran for nausea which is likely caused by narcotics, Tessalon for cough, have her continue antibiotics and steroids    Return if symptoms worsen or fail to improve. History of Present Illness     45 y.o. female with concern for sore throat x 2 weeks seen in the ED with concern for 8mm x 4mm right tonsillar abscess. Concern for pain in the right lower thorax/upper abdomen at the midaxillary line, 6/10 in intensity, worse with palpation, improved with fentanyl given in the ED but not completely. Is taking percocet for pain, however significant nausea and emesis - also with headaches. Throat pain has moderately improved - initially had voice changes and odynophagia, however now voice and throat pain have improved (was 10/10, now 8/10). Is able to tolerate secretions, liquids, pills. Significant mucous    Past Medical History     Past Medical History:   Diagnosis Date    Arthritis     Asthma     Hypertension     Obesity (BMI 30-39. 9)        Past Surgical History     Past Surgical History:   Procedure Laterality Date    UPPER GASTROINTESTINAL ENDOSCOPY N/A 6/24/2022    EGD BIOPSY performed by Cindi Primrose, [place] at [address] [email or web address] ENDOSCOPY       Family History No family history on file. Social History     Social History     Tobacco Use    Smoking status: Never    Smokeless tobacco: Never   Vaping Use    Vaping Use: Never used   Substance Use Topics    Alcohol use: No    Drug use: No        Allergies     No Known Allergies    Medications     Current Outpatient Medications   Medication Sig Dispense Refill    ondansetron (ZOFRAN) 4 MG tablet Take 1 tablet by mouth 3 times daily as needed for Nausea or Vomiting 15 tablet 0    benzonatate (TESSALON) 100 MG capsule Take 1-2 capsules by mouth 3 times daily as needed for Cough 60 capsule 0    acetaminophen (TYLENOL) 500 MG tablet Take 1 tablet by mouth 4 times daily as needed for Pain 120 tablet 0    amoxicillin-clavulanate (AUGMENTIN) 875-125 MG per tablet Take 1 tablet by mouth 2 times daily for 7 days 14 tablet 0    predniSONE (DELTASONE) 20 MG tablet Take 1 tablet by mouth daily for 5 days 5 tablet 0    oxyCODONE-acetaminophen (PERCOCET) 5-325 MG per tablet Take 1 tablet by mouth every 6 hours as needed for Pain for up to 5 days. Intended supply: 3 days. Take lowest dose possible to manage pain Max Daily Amount: 4 tablets 12 tablet 0    diclofenac sodium (VOLTAREN) 1 % GEL Apply 2 g topically in the morning and 2 g at noon and 2 g in the evening and 2 g before bedtime.  150 g 0    diphenhydrAMINE-APAP, sleep, (TYLENOL PM EXTRA STRENGTH PO) Take 2 tablets by mouth as needed      omeprazole (PRILOSEC) 20 MG delayed release capsule Take 1 capsule by mouth every morning (before breakfast) 90 capsule 1    metFORMIN (GLUCOPHAGE-XR) 500 MG extended release tablet Take 1 tablet by mouth daily (with breakfast) 30 tablet 5    PARoxetine (PAXIL CR) 12.5 MG extended release tablet Take 12.5 mg by mouth daily       FLUoxetine (PROZAC) 10 MG capsule TAKE 1 CAPSULE BY MOUTH ONCE DAILY      NIFEdipine (ADALAT CC) 30 MG extended release tablet Take 30 mg by mouth daily       Magic Mouthwash (MIRACLE MOUTHWASH) Swish and spit 10 mLs 4 times daily as needed for Irritation or Pain Dispense as Magic Mouthwash. Please add Equal Parts: 60 mL Maalox, 60 mL Viscous Lidocaine, 60 mL Benadryl to 60mL of Carafate. 10 ml swish and spit or swallow as directed above. 240 mL 0    albuterol sulfate HFA (VENTOLIN HFA) 108 (90 Base) MCG/ACT inhaler Inhale 2 puffs into the lungs 4 times daily as needed for Wheezing or Shortness of Breath 18 g 0    naproxen (NAPROSYN) 500 MG tablet Take 1 tablet by mouth in the morning and 1 tablet before bedtime. Do all this for 14 days. 28 tablet 0     No current facility-administered medications for this visit. Facility-Administered Medications Ordered in Other Visits   Medication Dose Route Frequency Provider Last Rate Last Admin    0.9 % sodium chloride infusion   IntraVENous Continuous Roz Colon MD           Review of Systems     Review of Systems   Constitutional:  Negative for activity change, chills, diaphoresis, fatigue, fever and unexpected weight change. HENT:  Positive for sore throat and trouble swallowing. Negative for congestion and hearing loss. Eyes:  Negative for visual disturbance. Respiratory:  Negative for shortness of breath and wheezing. Cardiovascular:  Negative for chest pain. Gastrointestinal:  Negative for abdominal pain. Musculoskeletal:  Negative for neck pain and neck stiffness. Skin:  Negative for rash. Neurological:  Negative for dizziness, light-headedness and headaches. Hematological:  Negative for adenopathy. PhysicalExam     Vitals:    12/29/22 1400   Resp: 16   Temp: 98 °F (36.7 °C)   TempSrc: Infrared   Weight: 220 lb (99.8 kg)   Height: 5' 5\" (1.651 m)       Physical Exam  Vitals reviewed. Constitutional:       Appearance: Normal appearance. HENT:      Head: Normocephalic and atraumatic. Right Ear: Tympanic membrane, ear canal and external ear normal. There is no impacted cerumen.       Left Ear: Tympanic membrane, ear canal and external ear normal. There is no impacted cerumen. Ears:      Mclean exam findings: Does not lateralize. Right Rinne: AC > BC. Left Rinne: AC > BC. Nose: No congestion or rhinorrhea. Mouth/Throat:      Lips: Pink. No lesions. Mouth: Mucous membranes are moist. No oral lesions. Tongue: No lesions. Tongue does not deviate from midline. Palate: No mass and lesions. Pharynx: Oropharynx is clear. Uvula midline. No oropharyngeal exudate or posterior oropharyngeal erythema. Tonsils: 3+ on the right. 2+ on the left. Comments: Exudates over the right tonsil  Eyes:      Extraocular Movements: Extraocular movements intact. Pupils: Pupils are equal, round, and reactive to light. Musculoskeletal:      Cervical back: Normal range of motion and neck supple. Lymphadenopathy:      Cervical: No cervical adenopathy. Neurological:      Mental Status: She is alert. Cranial Nerves: No cranial nerve deficit. Data Review        Procedure     None    Jewel Dalal MD  12/29/22    Portions of this note were dictated using Dragon.  There may be linguistic errors secondary to the use of this program.

## 2022-12-29 NOTE — PATIENT INSTRUCTIONS
Take pain medication for moderate or severe pain  Continue steroids, antibiotics as prescribed  May use anticough medicine 2-3 times daily as needed  Take antinausea medication up to 3 times daily as needed

## 2023-01-04 ENCOUNTER — TELEPHONE (OUTPATIENT)
Dept: BARIATRICS/WEIGHT MGMT | Age: 53
End: 2023-01-04

## 2023-01-04 NOTE — TELEPHONE ENCOUNTER
This is very concerning. I also looked for a result for the Amphetamine confirmatory test that was ordered in November and it still has not been completed even though we have talked with her and her daughter about it multiple times. In review of her chart I saw that the patient was in the ED on 12/25/2022 for a peritonsillar abscess and was given antibiotics, Percocet and prednisone (20 mg daily for 5 days). When she saw her ENT for an ED follow up on 12/29/2022 they recommended to continue the pain medication, antibiotics and steroids as prescribed. Patient will have to have her surgery postponed for at least 8 weeks from the last dose, but I also think she needs to come in and see Dr. Caty Aguiar as we can not risk her not following our recommendations postoperatively.   Thank you,  LINDA Lock

## 2023-01-04 NOTE — TELEPHONE ENCOUNTER
Brandi from [facility] left message stating patient discontinued all her medications without doctor supervision. Said she told her of importance of getting with physician before stopping medications. Also stated she refused [facility] interpretor.

## 2023-01-05 NOTE — TELEPHONE ENCOUNTER
Called and spoke with daughter and explained that I had seen that her mother was in the hospital for a tonsillar abscess or stone and then followed up with ENT a few days later. I explained that given the recent steroids and the issues with her throat being swollen and her difficulty swallowing that caused her to go to the ED we would need to postpone her surgery. I did look and the patient has her two week follow up appointment scheduled with Dr. Finn Dennis on 1/31/2023 so I let her daughter know to keep that appointment and we will just change it from our end to a follow up on postponing surgery. Also, let daughter know that her mom can stop the preoperative diet and we will provide new start date when we schedule her new surgery date. I also did let her know that her mom still needs to complete the amphetamine screening test. Daughter verbalized understanding and was appreciative of the call. [place] we can talk with Dr. Finn Dennis when he returns if he wants to wait until that visit to reschedule to see how she is doing or if we can go ahead and reschedule before seeing her.   Thank you,  Charlette Stewart, TYLER-C

## 2023-01-09 NOTE — TELEPHONE ENCOUNTER
Frisian Proud,  I spoke with Dr. Dyllan Dudley this weekend and we are going to wait to reschedule until he sees how she is doing on her 1/31/2023 appointment.   Thank you,  LINDA Vargas

## 2023-01-11 ENCOUNTER — OFFICE VISIT (OUTPATIENT)
Dept: ENT CLINIC | Age: 53
End: 2023-01-11
Payer: COMMERCIAL

## 2023-01-11 VITALS — HEIGHT: 64 IN | RESPIRATION RATE: 16 BRPM | BODY MASS INDEX: 37.56 KG/M2 | WEIGHT: 220 LBS | TEMPERATURE: 97 F

## 2023-01-11 DIAGNOSIS — R07.9 CHEST PAIN IN ADULT: ICD-10-CM

## 2023-01-11 DIAGNOSIS — R05.8 COUGH PRODUCTIVE OF PURULENT SPUTUM: Primary | ICD-10-CM

## 2023-01-11 PROCEDURE — 99213 OFFICE O/P EST LOW 20 MIN: CPT | Performed by: OTOLARYNGOLOGY

## 2023-01-11 PROCEDURE — G8427 DOCREV CUR MEDS BY ELIG CLIN: HCPCS | Performed by: OTOLARYNGOLOGY

## 2023-01-11 PROCEDURE — 3017F COLORECTAL CA SCREEN DOC REV: CPT | Performed by: OTOLARYNGOLOGY

## 2023-01-11 PROCEDURE — 1036F TOBACCO NON-USER: CPT | Performed by: OTOLARYNGOLOGY

## 2023-01-11 PROCEDURE — G8417 CALC BMI ABV UP PARAM F/U: HCPCS | Performed by: OTOLARYNGOLOGY

## 2023-01-11 PROCEDURE — G8484 FLU IMMUNIZE NO ADMIN: HCPCS | Performed by: OTOLARYNGOLOGY

## 2023-01-11 ASSESSMENT — ENCOUNTER SYMPTOMS
SORE THROAT: 0
COUGH: 1
CHEST TIGHTNESS: 0
SHORTNESS OF BREATH: 0
TROUBLE SWALLOWING: 0
ABDOMINAL PAIN: 0
WHEEZING: 0

## 2023-01-11 NOTE — PROGRESS NOTES
Angela Morales 89, [address], [address] [place] [address]  P: [phone]       Patient     Arti Chavira  1970    Chief Concern     Chief Complaint   Patient presents with    Follow-up     Here to follow up on her throat    Pain     Pain in throat, neck chest and right flank/ abdomen       Assessment and Plan      Diagnosis Orders   1. Cough productive of purulent sputum  XR CHEST PA LATERAL W BOTH OBLIQUE PROJECTIONS      2. Chest pain in adult          45 y.o. female with concern for purulent cough - no chest pain, but right lateral chest and that has been ongoing since her episode of pharyngitis. Worse with palpation, which suggest a musculoskeletal etiology-may be due to chronic cough. No dyspnea, bilateral breath sounds symmetric on auscultation. We have sent her home with 2 collection cups for purulent contents that she will return to the office, and have set her up for chest x-ray. No follow-ups on file. History of Present Illness     45 y.o. female with concern for sore throat x 2 weeks seen in the ED with concern for 8mm x 4mm right tonsillar abscess. Concern for pain in the right lower thorax/upper abdomen at the midaxillary line, 6/10 in intensity, worse with palpation, improved with fentanyl given in the ED but not completely. Is taking percocet for pain, however significant nausea and emesis - also with headaches. Throat pain has moderately improved - initially had voice changes and odynophagia, however now voice and throat pain have improved (was 10/10, now 8/10). Is able to tolerate secretions, liquids, pills. Significant mucous. Interval history 1/11/2023: Sore throat has resolved, but continues to have right mid axillary line pain, worse with palpation,    Past Medical History     Past Medical History:   Diagnosis Date    Arthritis     Asthma     Diabetes mellitus ([address]     patient denies 1/2023    Hypertension     Obesity (BMI 30-39. 9)        Past Surgical History     Past Surgical History:   Procedure Laterality Date    UPPER GASTROINTESTINAL ENDOSCOPY N/A 6/24/2022    EGD BIOPSY performed by Armani Brown MD at [address] [place] [address] Drive History     No family history on file. Social History     Social History     Tobacco Use    Smoking status: Never    Smokeless tobacco: Never   Vaping Use    Vaping Use: Never used   Substance Use Topics    Alcohol use: No    Drug use: No        Allergies     No Known Allergies    Medications     Current Outpatient Medications   Medication Sig Dispense Refill    ondansetron (ZOFRAN) 4 MG tablet Take 1 tablet by mouth 3 times daily as needed for Nausea or Vomiting (Patient taking differently: Take 4 mg by mouth 3 times daily as needed for Nausea or Vomiting 1/2023 patient states not taking) 15 tablet 0    diclofenac sodium (VOLTAREN) 1 % GEL Apply 2 g topically in the morning and 2 g at noon and 2 g in the evening and 2 g before bedtime. 150 g 0    diphenhydrAMINE-APAP, sleep, (TYLENOL PM EXTRA STRENGTH PO) Take 2 tablets by mouth as needed      omeprazole (PRILOSEC) 20 MG delayed release capsule Take 1 capsule by mouth every morning (before breakfast) (Patient taking differently: Take 20 mg by mouth every morning (before breakfast) 1/2023 states not taking) 90 capsule 1    NIFEdipine (ADALAT CC) 30 MG extended release tablet Take 30 mg by mouth daily Patient states stopped taking on her own      Magic Mouthwash (MIRACLE MOUTHWASH) Swish and spit 10 mLs 4 times daily as needed for Irritation or Pain Dispense as Magic Mouthwash. Please add Equal Parts: 60 mL Maalox, 60 mL Viscous Lidocaine, 60 mL Benadryl to 60mL of Carafate. 10 ml swish and spit or swallow as directed above. 240 mL 0    albuterol sulfate HFA (VENTOLIN HFA) 108 (90 Base) MCG/ACT inhaler Inhale 2 puffs into the lungs 4 times daily as needed for Wheezing or Shortness of Breath 18 g 0     No current facility-administered medications for this visit. Facility-Administered Medications Ordered in Other Visits   Medication Dose Route Frequency Provider Last Rate Last Admin    0.9 % sodium chloride infusion   IntraVENous Continuous Roldan Pelletier MD           Review of Systems     Review of Systems   Constitutional:  Negative for activity change, chills, diaphoresis, fatigue, fever and unexpected weight change. HENT:  Negative for congestion, hearing loss, sore throat and trouble swallowing. Eyes:  Negative for visual disturbance. Respiratory:  Positive for cough. Negative for chest tightness, shortness of breath and wheezing. Cardiovascular:  Negative for chest pain. Gastrointestinal:  Negative for abdominal pain. Musculoskeletal:  Negative for neck pain and neck stiffness. Skin:  Negative for rash. Neurological:  Negative for dizziness, light-headedness and headaches. Hematological:  Negative for adenopathy. PhysicalExam     Vitals:    01/11/23 1020   Resp: 16   Temp: 97 °F (36.1 °C)   TempSrc: Infrared   Weight: 220 lb (99.8 kg)   Height: 5' 4\" (1.626 m)       Physical Exam  Vitals reviewed. Constitutional:       Appearance: Normal appearance. HENT:      Head: Normocephalic and atraumatic. Right Ear: Tympanic membrane, ear canal and external ear normal. There is no impacted cerumen. Left Ear: Tympanic membrane, ear canal and external ear normal. There is no impacted cerumen. Ears:      Mclean exam findings: Does not lateralize. Right Rinne: AC > BC. Left Rinne: AC > BC. Nose: No congestion or rhinorrhea. Mouth/Throat:      Lips: Pink. No lesions. Mouth: Mucous membranes are moist. No oral lesions. Tongue: No lesions. Tongue does not deviate from midline. Palate: No mass and lesions. Pharynx: Oropharynx is clear. Uvula midline. No oropharyngeal exudate or posterior oropharyngeal erythema. Tonsils: 3+ on the right. 2+ on the left.       Comments: No exudates noted  Eyes: Extraocular Movements: Extraocular movements intact. Pupils: Pupils are equal, round, and reactive to light. Musculoskeletal:      Cervical back: Normal range of motion and neck supple. Lymphadenopathy:      Cervical: No cervical adenopathy. Neurological:      Mental Status: She is alert. Cranial Nerves: No cranial nerve deficit. Bilateral lung sounds clear without rales or asymmetry in 8 fields    Data Review        Procedure     None    Kim Brand MD  1/11/23    Portions of this note were dictated using Dragon.  There may be linguistic errors secondary to the use of this program.

## 2023-01-24 ENCOUNTER — OFFICE VISIT (OUTPATIENT)
Dept: ORTHOPEDIC SURGERY | Age: 53
End: 2023-01-24
Payer: COMMERCIAL

## 2023-01-24 VITALS — BODY MASS INDEX: 37.9 KG/M2 | WEIGHT: 222 LBS | HEIGHT: 64 IN

## 2023-01-24 DIAGNOSIS — M17.12 PRIMARY OSTEOARTHRITIS OF LEFT KNEE: ICD-10-CM

## 2023-01-24 DIAGNOSIS — M79.10 MUSCLE PAIN: ICD-10-CM

## 2023-01-24 DIAGNOSIS — M17.11 PRIMARY OSTEOARTHRITIS OF RIGHT KNEE: Primary | ICD-10-CM

## 2023-01-24 PROCEDURE — G8484 FLU IMMUNIZE NO ADMIN: HCPCS | Performed by: ORTHOPAEDIC SURGERY

## 2023-01-24 PROCEDURE — 1036F TOBACCO NON-USER: CPT | Performed by: ORTHOPAEDIC SURGERY

## 2023-01-24 PROCEDURE — G8417 CALC BMI ABV UP PARAM F/U: HCPCS | Performed by: ORTHOPAEDIC SURGERY

## 2023-01-24 PROCEDURE — 99213 OFFICE O/P EST LOW 20 MIN: CPT | Performed by: ORTHOPAEDIC SURGERY

## 2023-01-24 PROCEDURE — G8427 DOCREV CUR MEDS BY ELIG CLIN: HCPCS | Performed by: ORTHOPAEDIC SURGERY

## 2023-01-24 PROCEDURE — 3017F COLORECTAL CA SCREEN DOC REV: CPT | Performed by: ORTHOPAEDIC SURGERY

## 2023-01-24 RX ORDER — OMEPRAZOLE 20 MG/1
CAPSULE, DELAYED RELEASE ORAL
Qty: 90 CAPSULE | Refills: 0 | Status: SHIPPED | OUTPATIENT
Start: 2023-01-24

## 2023-01-24 NOTE — PROGRESS NOTES
Jason Bai  [id number]  January 24, 2023    Chief Complaint   Patient presents with    Follow-up     Rt Knee       History: The patient is a 15-year-old female who is here for evaluation of both knees. She is mostly concerned about her right knee. She last received the Visco supplement injections on 9/22. She received a right knee injection in early December. The injection did provide some relief. Her pain has returned. She rates her pain as 10/10. Unfortunately, the lap band procedure scheduled on January 16 was canceled due to an upper respiratory infection. She has difficulty getting up from a seated position she has pain at nighttime. The patient's  past medical history, medications, allergies,  family history, social history, and have been reviewed, and dated and are recorded in the chart. Pertinent items are noted in HPI. Review of systems reviewed from Pertinent History Form dated on 6/23/2021 and available in the patient's chart under the Media tab. Vitals:  Ht 5' 4\" (1.626 m)   Wt 222 lb (100.7 kg)   LMP 06/10/2020   BMI 38.11 kg/m²     Physical: Ms. Jason Bai appears well, she is in no apparent distress, she demonstrates appropriate mood & affect. She is alert and oriented to person, place and time. Examination of the bilateral lower extremity reveals no pain with range of motion of the hip. She has generalized tenderness to palpation about the joint line of the bilateral knee. Range of motion is from 0 degrees to 115 degrees bilaterally. Strength is 4+ to 5/5 for all muscle groups about the bilateral knee. There is patellofemoral crepitus with range of motion of the bilateral knee. Varus and valgus stressing of the knees reveals no evidence of instability. There is no clear evidence of  effusion in the knees. Anterior drawer and Lachman are negative bilaterally. Examination of the skin reveals no rashes, ulceration, or lesion, bilaterally in the lower extremities. Sensation to both lower extremities is grossly intact. Exam of both feet reveals pedal pulses intact and brisk cap refill. Patient is able to dorsiflex and wiggle all toes. Deep tendon reflexes of the lower extremities are normal and symmetric. She has diffuse tenderness to palpation over the anterior aspect of the right knee. X-rays: We again reviewed her knee x-rays from the past and she has bilateral knee osteoarthritis. The changes are most severe medially. Impression: Bilateral knee osteoarthritis #2 generalized muscle pain      Plan: At this time, we did explain to the patient that it is too soon for a repeat injection. We will give the patient a referral to a chiropractor, Vernon Matthews. She will plan on rescheduling for the Lap-Band procedure. She will continue to work on weight loss. She will continue to modify her activities. The patient can follow-up with me in approximately 6 weeks and we will reassess her then. If she continues to have the knee pain, we will consider repeat injection. The patient may eventually need total knee arthroplasty.

## 2023-01-31 ENCOUNTER — OFFICE VISIT (OUTPATIENT)
Dept: BARIATRICS/WEIGHT MGMT | Age: 53
End: 2023-01-31
Payer: COMMERCIAL

## 2023-01-31 VITALS
OXYGEN SATURATION: 98 % | RESPIRATION RATE: 18 BRPM | WEIGHT: 224 LBS | HEART RATE: 74 BPM | DIASTOLIC BLOOD PRESSURE: 74 MMHG | BODY MASS INDEX: 38.24 KG/M2 | HEIGHT: 64 IN | SYSTOLIC BLOOD PRESSURE: 128 MMHG

## 2023-01-31 DIAGNOSIS — I10 ESSENTIAL HYPERTENSION: ICD-10-CM

## 2023-01-31 DIAGNOSIS — R73.03 PREDIABETES: ICD-10-CM

## 2023-01-31 DIAGNOSIS — E66.01 SEVERE OBESITY (BMI 35.0-39.9) WITH COMORBIDITY (HCC): Primary | ICD-10-CM

## 2023-01-31 DIAGNOSIS — K21.9 CHRONIC GERD: ICD-10-CM

## 2023-01-31 DIAGNOSIS — E78.2 MIXED HYPERLIPIDEMIA: ICD-10-CM

## 2023-01-31 PROCEDURE — G8417 CALC BMI ABV UP PARAM F/U: HCPCS | Performed by: SURGERY

## 2023-01-31 PROCEDURE — 3074F SYST BP LT 130 MM HG: CPT | Performed by: SURGERY

## 2023-01-31 PROCEDURE — G8484 FLU IMMUNIZE NO ADMIN: HCPCS | Performed by: SURGERY

## 2023-01-31 PROCEDURE — G8427 DOCREV CUR MEDS BY ELIG CLIN: HCPCS | Performed by: SURGERY

## 2023-01-31 PROCEDURE — 1036F TOBACCO NON-USER: CPT | Performed by: SURGERY

## 2023-01-31 PROCEDURE — 3078F DIAST BP <80 MM HG: CPT | Performed by: SURGERY

## 2023-01-31 PROCEDURE — 3017F COLORECTAL CA SCREEN DOC REV: CPT | Performed by: SURGERY

## 2023-01-31 PROCEDURE — 99214 OFFICE O/P EST MOD 30 MIN: CPT | Performed by: SURGERY

## 2023-01-31 NOTE — PROGRESS NOTES
Nestor Michel lost 1 lbs over 6 weeks. Pt's daughter present. Is pt eating at least 4 times everyday? yes she is      Is pt eating a lean protein source with all meals and snacks? yes she is  more consistent. Using protein shake but wants to hold off on using our protein powder to save until after surgery. Stopped taking fusion MVI, thought had to take once per week. Reviewed that fusion capsule is to be started the Monday after surgery, and taken daily (not weekly). Has pt decreased their portions using the plate method? yes she is    Is pt choosing low fat/sugar free options? yes she is    Is pt drinking at least 64 oz of clear liquids everyday? Yes she is     Has pt stopped drinking carbonation, caffeinated, and sugar sweetened beverages? Avoiding all. Drinks decaf tea with splenda    Has pt sampled Unjury and/or Nectar protein? yes 5-6 flavors     Has Patient Attended a Support Group? yes    Participating in intentional exercise?  None intentional but very active during daily schedule, works at a childcare    Plan/Recommendations: continue with nutrition plan     Handouts: protein shake handout    Bowen Simpson, MS, RD, LD

## 2023-01-31 NOTE — PROGRESS NOTES
[facility]) Physicians   [employer] [facility] [employer]  Butch Domínguez MD, [facility], [address], [address] [facility]    [place]  [address] . Phone: [phone]  Fax: [phone]          Chief Complaint   Patient presents with    Obesity     9th pre-surg         HPI:     Christy Lares is a very pleasant 45 y.o. female with Body mass index is 38.45 kg/m². / Chronic Obesity. Leilani Tucker has been struggling for several years now with obesity. Leilani Tucker feels the weight is an obstacle to achieve and perform things in daily living as well risk on health. Patient  is very determined to lose weight and be healthy, and is working towards  surgical weight loss to achieve this goal. Pre-operative clearance and work up pending. Working hard to keep good dietary habits as well level of activity. Patient denies any nausea, vomiting, fevers, chills, shortness of breath, chest pain, cough, constipation or difficulty urinating. Pain Assessment   Denies any abdominal pain       Past Medical History:   Diagnosis Date    Arthritis     Asthma     Diabetes mellitus ([address]     patient denies 1/2023    Hypertension     Obesity (BMI 30-39. 9)      Past Surgical History:   Procedure Laterality Date    UPPER GASTROINTESTINAL ENDOSCOPY N/A 6/24/2022    EGD BIOPSY performed by Carrie Lazaro MD at [address] [place] [address]     History reviewed. No pertinent family history. Social History     Tobacco Use    Smoking status: Never    Smokeless tobacco: Never   Substance Use Topics    Alcohol use: No     I counseled the patient on the importance of not smoking and risks of ETOH. No Known Allergies  Vitals:    01/31/23 1132   BP: 128/74   Pulse: 74   Resp: 18   SpO2: 98%   Weight: 224 lb (101.6 kg)   Height: 5' 4\" (1.626 m)       Body mass index is 38.45 kg/m².     Lab Results   Component Value Date/Time    WBC 8.4 12/25/2022 02:59 PM    RBC 4.43 12/25/2022 02:59 PM    HGB 12.7 12/25/2022 02:59 PM    HCT 38.6 12/25/2022 02:59 PM MCV 87.1 12/25/2022 02:59 PM    MCH 28.8 12/25/2022 02:59 PM    MCHC 33.0 12/25/2022 02:59 PM    MPV 7.5 12/25/2022 02:59 PM    NEUTOPHILPCT 73.5 12/25/2022 02:59 PM    LYMPHOPCT 18.3 12/25/2022 02:59 PM    MONOPCT 7.3 12/25/2022 02:59 PM    EOSRELPCT 0.5 12/25/2022 02:59 PM    BASOPCT 0.4 12/25/2022 02:59 PM    NEUTROABS 6.2 12/25/2022 02:59 PM    LYMPHSABS 1.5 12/25/2022 02:59 PM    MONOSABS 0.6 12/25/2022 02:59 PM    EOSABS 0.0 12/25/2022 02:59 PM     Lab Results   Component Value Date/Time     12/25/2022 02:59 PM    K 4.2 12/25/2022 02:59 PM     12/25/2022 02:59 PM    CO2 26 12/25/2022 02:59 PM    ANIONGAP 11 12/25/2022 02:59 PM    GLUCOSE 142 12/25/2022 02:59 PM    BUN 14 12/25/2022 02:59 PM    CREATININE 0.8 12/25/2022 02:59 PM    LABGLOM >60 12/25/2022 02:59 PM    GFRAA >60 03/08/2022 08:46 AM    CALCIUM 9.6 12/25/2022 02:59 PM    PROT 7.4 03/08/2022 08:46 AM    LABALBU 4.2 03/08/2022 08:46 AM    AGRATIO 1.3 03/08/2022 08:46 AM    BILITOT 0.3 03/08/2022 08:46 AM    ALKPHOS 78 03/08/2022 08:46 AM    ALT 20 03/08/2022 08:46 AM    AST 16 03/08/2022 08:46 AM    GLOB 4.0 10/02/2021 04:27 PM     Lab Results   Component Value Date/Time    CHOL 201 03/08/2022 08:46 AM    TRIG 46 03/08/2022 08:46 AM    HDL 85 03/08/2022 08:46 AM    LDLCALC 107 03/08/2022 08:46 AM    LABVLDL 9 03/08/2022 08:46 AM     Lab Results   Component Value Date/Time    TSHREFLEX 2.63 03/08/2022 08:46 AM     Lab Results   Component Value Date/Time    IRON 73 03/08/2022 08:46 AM    TIBC 401 03/08/2022 08:46 AM    LABIRON 18 03/08/2022 08:46 AM     Lab Results   Component Value Date/Time    XHSSTGXG55 1391 03/08/2022 08:46 AM    FOLATE 14.51 03/08/2022 08:46 AM     Lab Results   Component Value Date/Time    VITD25 30.0 03/08/2022 08:46 AM     Lab Results   Component Value Date/Time    LABA1C 6.4 10/25/2021 01:06 PM    .0 10/25/2021 01:06 PM         Current Outpatient Medications:     omeprazole (PRILOSEC) 20 MG delayed release capsule, TAKE 1 CAPSULE BY MOUTH ONCE DAILY IN THE MORNING BEFORE BREAKFAST, Disp: 90 capsule, Rfl: 0    ondansetron (ZOFRAN) 4 MG tablet, Take 1 tablet by mouth 3 times daily as needed for Nausea or Vomiting (Patient taking differently: Take 4 mg by mouth 3 times daily as needed for Nausea or Vomiting 1/2023 patient states not taking), Disp: 15 tablet, Rfl: 0    diclofenac sodium (VOLTAREN) 1 % GEL, Apply 2 g topically in the morning and 2 g at noon and 2 g in the evening and 2 g before bedtime. , Disp: 150 g, Rfl: 0    diphenhydrAMINE-APAP, sleep, (TYLENOL PM EXTRA STRENGTH PO), Take 2 tablets by mouth as needed, Disp: , Rfl:     NIFEdipine (ADALAT CC) 30 MG extended release tablet, Take 30 mg by mouth daily Patient states stopped taking on her own, Disp: , Rfl:     Magic Mouthwash (MIRACLE MOUTHWASH), Swish and spit 10 mLs 4 times daily as needed for Irritation or Pain Dispense as Magic Mouthwash. Please add Equal Parts: 60 mL Maalox, 60 mL Viscous Lidocaine, 60 mL Benadryl to 60mL of Carafate. 10 ml swish and spit or swallow as directed above., Disp: 240 mL, Rfl: 0    albuterol sulfate HFA (VENTOLIN HFA) 108 (90 Base) MCG/ACT inhaler, Inhale 2 puffs into the lungs 4 times daily as needed for Wheezing or Shortness of Breath, Disp: 18 g, Rfl: 0    Review of Systems - History obtained from the patient  General ROS: negative  Psychological ROS: negative  Ophthalmic ROS: negative  Neurological ROS: negative  ENT ROS: negative  Allergy and Immunology ROS: negative  Hematological and Lymphatic ROS: negative  Endocrine ROS: negative  Breast ROS: negative  Respiratory ROS: negative  Cardiovascular ROS: negative  Gastrointestinal ROS:negative  Genito-Urinary ROS: negative  Musculoskeletal ROS: negative   Skin ROS: negative    Physical Exam   Vitals Reviewed   Constitutional: Patient is oriented to person, place, and time. Patient appears well-developed and well-nourished. Patient is active and cooperative.   Non-toxic appearance. No distress. HENT:   Head: Normocephalic and atraumatic. Head is without abrasion and without laceration. Hair is normal.   Right Ear: External ear normal. No lacerations. No drainage, swelling . Left Ear: External ear normal. No lacerations. No drainage, swelling. Nose/Mouth: face mask in place  Eyes: Conjunctivae, EOM and lids are normal. Right eye exhibits no discharge. No foreign body present in the right eye. Left eye exhibits no discharge. No foreign body present in the left eye. No scleral icterus. Neck: Trachea normal and normal range of motion. No JVD present. Pulmonary/Chest: Effort normal. No accessory muscle usage or stridor. No apnea. No respiratory distress. Cardiovascular: Normal rate and no JVD. Abdominal: Normal appearance. Patient exhibits no distension. Abdomen is soft, obese, non tender. Musculoskeletal: Normal range of motion. Patient exhibits no edema. Neurological: Patient is alert and oriented to person, place, and time. Patient has normal strength. GCS eye subscore is 4. GCS verbal subscore is 5. GCS motor subscore is 6. Skin: Skin is warm and dry. No abrasion and no rash noted. Patient is not diaphoretic. No cyanosis or erythema. Psychiatric: Patient has a normal mood and affect. Speech is normal and behavior is normal. Cognition and memory are normal.       A/P    Sherry Solis is 45 y.o. female, Body mass index is 38.45 kg/m². pre surgery, has lost 1 lb since last visit. The patient underwent extensive dietary counseling with registered dietician. I have reviewed, discussed and agree with the dietary plan. Patient is trying hard to keep good dietary and behavior modifications. Patient is monitoring portion sizes, food choices and liquid calories. Patient is trying to exercise regularly as much as possible. Obesity as a disease is considered a high risk to patients overall health and should therefore be considered a high risk disease state. Advised the patient that not getting there weight under control, that could increase risk of complications/worsening of those conditions on the long-term. (Goal of weight loss surgery is to alleviate/control some of those co-morbidities)    Now with Covid-19 pandemic, CDC and health authorities does classify obese patients as vulnerable and high risk as well. Which makes weight loss a priority for improvement of their wellbeing and overall health. I encouraged the patient to continue exercise and keeping healthy eating habits. Discussed pre-op labs and work up till now. Also counseled the patient extensively on Surgery. I did explain thoroughly to the patient that compliance with pre- and post op diet and other recommendations are integral part to improve the chances of successful weight loss and also not following it could end with serious health complications. Some strategies discussed today include but not limited to : 30/30/30 minutes rule, food diary, avoid fast food and packing/planing ahead, & increasing exercise. Also stressed to the patient importance of taking the multivitamins as instructed, otherwise risk significant complications. The visit today, included any number of the following: Bariatric Preoperative work up/protocols, review of labs, imaging, provider notes, outside hospital records, performing examination/evaluation, counseling patient and/or family, ordering medications/tests, placing referrals and communication with referring physicians, coordination of care; discussing dietary plan/recall with the patient as well with registered dietitian and documentation in the EHR. Of note, the above was done during same day of the actual patient encounter. Oneil Casanova is here for presurgical follow-up. Patient surgery had to be postponed as she had a tonsillar stone/questionable abscess. She saw ENT and she was already feeling better.   Patient got steroids in December 25 the surgery cannot be done until after February 25. Patient still needs to do her care source presurgical labs including urine drug testing. Patient and her daughter today informed me that patient has no difficulty swallowing and she feels much better than before. We discussed how her excess weight affects her overall health and importance of weight loss, healthy diet and active lifestyle to alleviate those co morbid conditions, otherwise risk deterioration. Morbid Obesity: Body mass index is 38.45 kg/m². [x] Continue to make dietary and lifestyle modifications. [x] Plan for Future laparoscopic sleeve gastrectomy. [x] Return for follow-up next month. Chronic GERD:   [x] Continue to make dietary and lifestyle modifications. [x] Continue Omeprazole. [] Continue Famotidine. [] Plan for EGD to evaluate the stomach. Essential Hypertension:  [x] Continue to make dietary and lifestyle modifications. [x] Reviewed the importance of checking blood pressure. [x] Continue to follow up with their PCP for medication management and monitoring. Prediabetes / Diabetes Mellitus Type II in Obese:   [x] Continue to make dietary and lifestyle modifications. [x] Reviewed the importance of checking blood sugars. [x] Continue to follow up with their PCP for medication management and monitoring. Hyperlipidemia:   [x] Continue to make dietary and lifestyle modifications. [x] Continue to follow up with their PCP for medication management and monitoring. Patient advised that its their responsibility to follow up for studies, referrals and/or labs ordered today.

## 2023-02-07 ENCOUNTER — HOSPITAL ENCOUNTER (EMERGENCY)
Age: 53
Discharge: HOME | End: 2023-02-07
Attending: EMERGENCY MEDICINE
Payer: COMMERCIAL

## 2023-02-07 VITALS
WEIGHT: 223.33 LBS | TEMPERATURE: 97.6 F | DIASTOLIC BLOOD PRESSURE: 91 MMHG | HEART RATE: 99 BPM | HEIGHT: 65 IN | SYSTOLIC BLOOD PRESSURE: 146 MMHG | OXYGEN SATURATION: 97 % | RESPIRATION RATE: 20 BRPM | BODY MASS INDEX: 37.21 KG/M2

## 2023-02-07 DIAGNOSIS — H57.13 EYE PAIN, BILATERAL: Primary | ICD-10-CM

## 2023-02-07 PROCEDURE — 6370000000 HC RX 637 (ALT 250 FOR IP): Performed by: EMERGENCY MEDICINE

## 2023-02-07 PROCEDURE — 99282 EMERGENCY DEPT VISIT SF MDM: CPT

## 2023-02-07 RX ADMIN — FLUORESCEIN SODIUM 1 MG: 1 STRIP OPHTHALMIC at 17:51

## 2023-02-07 ASSESSMENT — PAIN DESCRIPTION - LOCATION: LOCATION: EYE

## 2023-02-07 ASSESSMENT — ENCOUNTER SYMPTOMS
NAUSEA: 0
ABDOMINAL PAIN: 0
SHORTNESS OF BREATH: 0
STRIDOR: 0
WHEEZING: 0
VOMITING: 0
PHOTOPHOBIA: 0
TROUBLE SWALLOWING: 0
EYE PAIN: 1
FACIAL SWELLING: 0
VOICE CHANGE: 0
COLOR CHANGE: 0
EYE DISCHARGE: 0
EYE REDNESS: 0

## 2023-02-07 ASSESSMENT — PAIN DESCRIPTION - ORIENTATION: ORIENTATION: RIGHT;LEFT

## 2023-02-07 ASSESSMENT — PAIN SCALES - GENERAL: PAINLEVEL_OUTOF10: 4

## 2023-02-07 ASSESSMENT — PAIN - FUNCTIONAL ASSESSMENT: PAIN_FUNCTIONAL_ASSESSMENT: 0-10

## 2023-02-07 ASSESSMENT — VISUAL ACUITY
OU: 20/50
OS: 20/50
OD: 20/50

## 2023-02-07 NOTE — ED TRIAGE NOTES
Pt states that she is having bilateral eye pain that started around 1600 yesterday. Pt states she does not wear glasses or contacts. Pt denies getting anything into her eyes that she knows of.

## 2023-02-07 NOTE — DISCHARGE INSTRUCTIONS
Please limit your screen time to 1 hour a day or less and call [phone] AM tomorrow morning to ask for a same-day appointment. If your symptoms worsen please come back to the ER.

## 2023-02-07 NOTE — ED PROVIDER NOTES
[address] [facility]  eMERGENCY dEPARTMENT eNCOUnter      Pt Name: Rhona Heller  MRN: [id number]  [place] 1970  Date of evaluation: 2/7/2023  Provider: Luis Miranda MD    [address] [place] [address]       Chief Complaint   Patient presents with    Eye Pain     Bilateral eye pain started yesterday around 1100. HISTORY OF PRESENT ILLNESS   (Location/Symptom, Timing/Onset, Context/Setting, Quality, Duration, Modifying Factors, Severity)  Note limiting factors. History obtained from: the patient via iPad  Zeno Kanner #[id number] and the patients daughter    Rhona Heller is a 45 y.o. female with hx of diabetes who presents due to bilateral eye pain and burning for the past 2 days. The patient does report using her iPad and therefore staring at a screen for much much longer than she normally does. Patient also reports blurry vision. Patient reports her symptoms are equal bilaterally and happened gradually over 2-day timeframe. Patient denies any trauma to the eyes. Patient denies any fever. Patient has any cough shortness of breath runny nose or sick contacts. HPI    Nursing Notes were reviewed. REVIEW OFSYSTEMS    (2-9 systems for level 4, 10 or more for level 5)     Review of Systems   Constitutional:  Negative for appetite change, fever and unexpected weight change. HENT:  Negative for facial swelling, trouble swallowing and voice change. Eyes:  Positive for pain and visual disturbance. Negative for photophobia, discharge and redness. Respiratory:  Negative for shortness of breath, wheezing and stridor. Cardiovascular:  Negative for chest pain and palpitations. Gastrointestinal:  Negative for abdominal pain, nausea and vomiting. Genitourinary:  Negative for dysuria and vaginal bleeding. Musculoskeletal:  Negative for neck pain and neck stiffness. Skin:  Negative for color change and wound. Neurological:  Negative for seizures and syncope. Psychiatric/Behavioral:  Negative for self-injury and suicidal ideas. Except as noted above the remainder of the review of systems was reviewed and negative. PAST MEDICAL HISTORY     Past Medical History:   Diagnosis Date    Arthritis     Asthma     Diabetes mellitus ([address]     patient denies 1/2023    Hypertension     Obesity (BMI 30-39. 9)          SURGICAL HISTORY       Past Surgical History:   Procedure Laterality Date    UPPER GASTROINTESTINAL ENDOSCOPY N/A 6/24/2022    EGD BIOPSY performed by Faustina Prakash MD at [facility]       Previous Medications    ALBUTEROL SULFATE HFA (VENTOLIN HFA) 108 (90 BASE) MCG/ACT INHALER    Inhale 2 puffs into the lungs 4 times daily as needed for Wheezing or Shortness of Breath    DICLOFENAC SODIUM (VOLTAREN) 1 % GEL    Apply 2 g topically in the morning and 2 g at noon and 2 g in the evening and 2 g before bedtime. DIPHENHYDRAMINE-APAP, SLEEP, (TYLENOL PM EXTRA STRENGTH PO)    Take 2 tablets by mouth as needed    MAGIC MOUTHWASH (MIRACLE MOUTHWASH)    Swish and spit 10 mLs 4 times daily as needed for Irritation or Pain Dispense as Magic Mouthwash. Please add Equal Parts: 60 mL Maalox, 60 mL Viscous Lidocaine, 60 mL Benadryl to 60mL of Carafate. 10 ml swish and spit or swallow as directed above. NIFEDIPINE (ADALAT CC) 30 MG EXTENDED RELEASE TABLET    Take 30 mg by mouth daily Patient states stopped taking on her own    OMEPRAZOLE (PRILOSEC) 20 MG DELAYED RELEASE CAPSULE    TAKE 1 CAPSULE BY MOUTH ONCE DAILY IN THE MORNING BEFORE BREAKFAST    ONDANSETRON (ZOFRAN) 4 MG TABLET    Take 1 tablet by mouth 3 times daily as needed for Nausea or Vomiting       ALLERGIES     Patient has no known allergies. FAMILY HISTORY     No family history on file.        SOCIAL HISTORY       Social History     Socioeconomic History    Marital status: Single   Tobacco Use    Smoking status: Never    Smokeless tobacco: Never   Vaping Use Vaping Use: Never used   Substance and Sexual Activity    Alcohol use: No    Drug use: No   Social History Narrative    ** Merged History Encounter **              PHYSICAL EXAM    (up to 7 for level 4, 8 or more for level 5)     ED Triage Vitals [02/07/23 1735]   BP Temp Temp Source Heart Rate Resp SpO2 Height Weight   (!) 158/109 97.6 °F (36.4 °C) Oral 98 16 96 % 5' 5\" (1.651 m) 223 lb 5.2 oz (101.3 kg)       Physical Exam  Constitutional:       General: She is not in acute distress. Appearance: She is well-developed. HENT:      Head: Normocephalic and atraumatic. Eyes:      Comments: No scleral injection. No hyphema or hypopyon. Extraocular movements intact bilaterally. No fluorescein uptake on fluorescein exam.  No corneal abrasion or foreign body. Intraocular pressure 19 mmHg in the left eye and 21 mmHg on the right eye. Visual acuity 20/50 bilaterally when he was in the shape of chart instead of letter chart. pH paper use with pH of 7.0 in bilateral eyes   Neck:      Vascular: No JVD. Cardiovascular:      Rate and Rhythm: Normal rate. Pulmonary:      Effort: Pulmonary effort is normal. No respiratory distress. Abdominal:      Tenderness: There is no abdominal tenderness. There is no rebound. Musculoskeletal:         General: No deformity. Neurological:      Mental Status: She is alert.        EMERGENCY DEPARTMENT COURSE and DIFFERENTIAL DIAGNOSIS/MDM:   Vitals:    Vitals:    02/07/23 1735   BP: (!) 158/109   Pulse: 98   Resp: 16   Temp: 97.6 °F (36.4 °C)   TempSrc: Oral   SpO2: 96%   Weight: 223 lb 5.2 oz (101.3 kg)   Height: 5' 5\" (1.651 m)       Patient was given the following medications:  Medications   fluorescein ophthalmic strip 1 mg (1 mg Ophthalmic Given 2/7/23 1751)           CC/HPI Summary, DDx, ED Course, Reassessment, Disposition Considerations (include Tests not done, Admit vs D/C, Shared Decision Making, Pt Expectation of Test or Tx.):  Broad differential diagnosis considered including glaucoma, conjunctivitis, chemical injury, retinal detachment. Patient is given tetracaine eyedrops and reports complete resolution of all pain. Visual acuity does improved from 20/200 on arrival to 20/50. Fluorescein exam does not show any fluorescein uptake or signs of globe rupture or foreign body. pH of 7.0 is not indicative of any current chemical injury. Patient's daughter reports she strongly thinks the patient symptoms are due to increased eyestrain from using her iPad much more than usual and the patient also endorses this. As her symptoms have completely resolved with tetracaine this makes glaucoma less likely and intraocular pressure also does not suggest glaucoma. Feel patient is appropriate for eye rest, limiting screen time, and next a [address] follow-up for repeat evaluation especially given patient's vision is 20/50 currently and she does not have any visual aids at baseline. Patient expresses understanding and agreement with this plan and is discharged home. FINAL IMPRESSION      1. Eye pain, bilateral          DISPOSITION/PLAN     DISPOSITION Decision To Discharge 02/07/2023 06:20:14 PM      PATIENT REFERRED TO:  [address] [place] [address]  [phone]    In 1 day      [place] [address]  [phone]    If symptoms worsen           (Please note that portions of this note were completed with a voice recognition program.  Efforts were made to edit the dictations but occasionally words are mis-transcribed. )    Niharika Mccurdy MD (electronically signed)  Attending Emergency Physician           Niharika Mccurdy MD  02/07/23 4927

## 2023-02-07 NOTE — ED NOTES
D/C: Order noted for d/c. Pt confirmed d/c paperwork has correct name. Discharge and education instructions reviewed with patient. Teach-back successful. Pt verbalized understanding and signed d/c papers. Pt denied questions at this time. No acute distress noted. Patient instructed to follow-up as noted - return to emergency department if symptoms worsen. Patient verbalized understanding. Discharged per EDMD with discharge instructions. Pt discharged to private vehicle with family. Patient stable upon departure. Thanked patient for choosing [facility] for care. Provider aware of patient pain at time of discharge.        Linda Harrison RN  02/07/23 6737

## 2023-02-09 ENCOUNTER — HOSPITAL ENCOUNTER (OUTPATIENT)
Age: 53
Discharge: HOME | End: 2023-02-09
Payer: COMMERCIAL

## 2023-02-09 ENCOUNTER — HOSPITAL ENCOUNTER (OUTPATIENT)
Dept: GENERAL RADIOLOGY | Age: 53
Discharge: HOME | End: 2023-02-09
Payer: COMMERCIAL

## 2023-02-09 DIAGNOSIS — R05.8 COUGH PRODUCTIVE OF PURULENT SPUTUM: ICD-10-CM

## 2023-02-09 PROCEDURE — 71046 X-RAY EXAM CHEST 2 VIEWS: CPT

## 2023-02-13 ENCOUNTER — TELEPHONE (OUTPATIENT)
Dept: ENT CLINIC | Age: 53
End: 2023-02-13

## 2023-02-13 NOTE — TELEPHONE ENCOUNTER
----- Message from Brien Garza MD sent at 2/10/2023  4:33 PM EST -----  Please notify the patient that the chest x-ray did not have any significant findings.  Thanks.   ----- Message -----  From: Chiquita Elias Incoming Radiology Results From [facility]  Sent: 2/9/2023  12:40 PM EST  To: Brien Garza MD

## 2023-02-15 ASSESSMENT — ENCOUNTER SYMPTOMS
SHORTNESS OF BREATH: 0
ALLERGIC/IMMUNOLOGIC NEGATIVE: 1
RESPIRATORY NEGATIVE: 1
COUGH: 0
GASTROINTESTINAL NEGATIVE: 1
EYES NEGATIVE: 1

## 2023-02-15 NOTE — PROGRESS NOTES
[facility]   [employer]    Subjective:      Patient ID: Kori Byrne is a 53 y.o. female    HPI    The patient is here for their bariatric surgery preoperative education group visit. She has made several attempts at weight loss in the past without success and now has been scheduled to have bariatric surgery on March 13, 2023 for future weight loss. She is working to change her dietary behaviors and lose weight to improve comorbid conditions such as hypertension, prediabetes, hyperlipidemia and GERD.    Kori Byrne is a very pleasant 53 y.o. female with Body mass index is 38.42 kg/m².    Past Medical History:   Diagnosis Date    Arthritis     Asthma     Diabetes mellitus ([facility])     patient denies 1/2023    Hypertension     Obesity (BMI 30-39.9)      Past Surgical History:   Procedure Laterality Date    UPPER GASTROINTESTINAL ENDOSCOPY N/A 6/24/2022    EGD BIOPSY performed by Nasrin Lomax MD at [facility]     No family history on file.  Social History     Tobacco Use    Smoking status: Never    Smokeless tobacco: Never   Substance Use Topics    Alcohol use: No     I counseled the patient on the importance of not smoking and risks of ETOH.   No Known Allergies  Vitals:    02/28/23 1314   Weight: 223 lb 12.8 oz (101.5 kg)   Height: 5' 4\" (1.626 m)     Body mass index is 38.42 kg/m².    Current Outpatient Medications:     linaclotide (LINZESS) 145 MCG capsule, Take 145 mcg by mouth every morning (before breakfast), Disp: , Rfl:     omeprazole (PRILOSEC) 20 MG delayed release capsule, TAKE 1 CAPSULE BY MOUTH ONCE DAILY IN THE MORNING BEFORE BREAKFAST, Disp: 90 capsule, Rfl: 0    diclofenac sodium (VOLTAREN) 1 % GEL, Apply 2 g topically in the morning and 2 g at noon and 2 g in the evening and 2 g before bedtime., Disp: 150 g, Rfl: 0    diphenhydrAMINE-APAP, sleep, (TYLENOL PM EXTRA STRENGTH PO), Take 2 tablets by mouth as needed, Disp: , Rfl:     NIFEdipine (ADALAT CC) 30 MG  extended release tablet, Take 30 mg by mouth daily Patient states stopped taking on her own, Disp: , Rfl:     albuterol sulfate HFA (VENTOLIN HFA) 108 (90 Base) MCG/ACT inhaler, Inhale 2 puffs into the lungs 4 times daily as needed for Wheezing or Shortness of Breath, Disp: 18 g, Rfl: 0    Lab Results   Component Value Date/Time    WBC 8.4 12/25/2022 02:59 PM    RBC 4.43 12/25/2022 02:59 PM    HGB 12.7 12/25/2022 02:59 PM    HCT 38.6 12/25/2022 02:59 PM    MCV 87.1 12/25/2022 02:59 PM    MCH 28.8 12/25/2022 02:59 PM    MCHC 33.0 12/25/2022 02:59 PM    MPV 7.5 12/25/2022 02:59 PM    NEUTOPHILPCT 73.5 12/25/2022 02:59 PM    LYMPHOPCT 18.3 12/25/2022 02:59 PM    MONOPCT 7.3 12/25/2022 02:59 PM    EOSRELPCT 0.5 12/25/2022 02:59 PM    BASOPCT 0.4 12/25/2022 02:59 PM    NEUTROABS 6.2 12/25/2022 02:59 PM    LYMPHSABS 1.5 12/25/2022 02:59 PM    MONOSABS 0.6 12/25/2022 02:59 PM    EOSABS 0.0 12/25/2022 02:59 PM     Lab Results   Component Value Date/Time     12/25/2022 02:59 PM    K 4.2 12/25/2022 02:59 PM     12/25/2022 02:59 PM    CO2 26 12/25/2022 02:59 PM    ANIONGAP 11 12/25/2022 02:59 PM    GLUCOSE 142 12/25/2022 02:59 PM    BUN 14 12/25/2022 02:59 PM    CREATININE 0.8 12/25/2022 02:59 PM    LABGLOM >60 12/25/2022 02:59 PM    GFRAA >60 03/08/2022 08:46 AM    CALCIUM 9.6 12/25/2022 02:59 PM    PROT 7.4 03/08/2022 08:46 AM    LABALBU 4.2 03/08/2022 08:46 AM    AGRATIO 1.3 03/08/2022 08:46 AM    BILITOT 0.3 03/08/2022 08:46 AM    ALKPHOS 78 03/08/2022 08:46 AM    ALT 20 03/08/2022 08:46 AM    AST 16 03/08/2022 08:46 AM    GLOB 4.0 10/02/2021 04:27 PM     Lab Results   Component Value Date/Time    CHOL 201 03/08/2022 08:46 AM    TRIG 46 03/08/2022 08:46 AM    HDL 85 03/08/2022 08:46 AM    LDLCALC 107 03/08/2022 08:46 AM    LABVLDL 9 03/08/2022 08:46 AM     Lab Results   Component Value Date/Time    TSHREFLEX 2.63 03/08/2022 08:46 AM     Lab Results   Component Value Date/Time    IRON 73 03/08/2022 08:46 AM TIBC 401 03/08/2022 08:46 AM    LABIRON 18 03/08/2022 08:46 AM     Lab Results   Component Value Date/Time    QAGSRZHW57 1391 03/08/2022 08:46 AM    FOLATE 14.51 03/08/2022 08:46 AM     Lab Results   Component Value Date/Time    VITD25 30.0 03/08/2022 08:46 AM     Lab Results   Component Value Date/Time    LABA1C 6.4 10/25/2021 01:06 PM    .0 10/25/2021 01:06 PM       Review of Systems   Constitutional: Negative. Negative for chills, fatigue and fever. HENT: Negative. Eyes: Negative. Respiratory: Negative. Negative for cough and shortness of breath. Cardiovascular: Negative. Gastrointestinal: Negative. Endocrine: Negative. Genitourinary: Negative. Musculoskeletal: Negative. Skin: Negative. Allergic/Immunologic: Negative. Neurological: Negative. Hematological: Negative. Psychiatric/Behavioral: Negative. Objective:     Physical Exam  Vitals reviewed. Constitutional:       Appearance: Normal appearance. She is well-developed. She is obese. HENT:      Head: Normocephalic and atraumatic. Eyes:      Conjunctiva/sclera: Conjunctivae normal.      Pupils: Pupils are equal, round, and reactive to light. Pulmonary:      Effort: Pulmonary effort is normal.   Abdominal:      Palpations: Abdomen is soft. Musculoskeletal:         General: Normal range of motion. Cervical back: Normal range of motion and neck supple. Skin:     General: Skin is warm and dry. Neurological:      Mental Status: She is alert and oriented to person, place, and time. Psychiatric:         Mood and Affect: Mood normal.         Behavior: Behavior normal.         Thought Content: Thought content normal.         Judgment: Judgment normal.     Assessment and Plan:   Patient is here for their preoperative education group visit for sleeve gastrectomy. The patient is down 0.2 lbs today. The patient's current Body mass index is 38.42 kg/m². (2/28/23).  She is making good dietary and behavior modifications and is considered to be a good surgical candidate. Patient has a diagnosis of hypertension. Reviewed the importance of checking blood pressure preoperatively and postoperatively. In addition, following up with their PCP for medication adjustments as needed. Discussed the fact that they will be required to crush or open their medications for the first two weeks after surgery and reviewed those medications that can not be crushed. Patient has a diagnosis of prediabetes. Reviewed the importance of complying with post op diet. Patient has a diagnosis of hyperlipidemia. Discussed the benefits of weight loss and dietary changes on lipids and cholesterol. Discussed the fact that they will be required to crush or open their medications for the first two weeks after surgery and reviewed those medications that can not be crushed. Patient has a diagnosis of chronic GERD and takes a PPI. Discussed the benefits of weight loss and dietary changes on acid reflux. However, they will most likely need to continue their medication short term after surgery as they adjust to the new diet. Discussed the fact that they will be required to crush or open their medications for the first two weeks after surgery and reviewed those medications that can not be crushed. Patient is postmenopausal so did not need to receive instruction that it is recommended to avoid pregnancy following bariatric surgery for at least 2 years to allow them to have stable weight loss and to help avoid increased risk of vitamin deficiencies and malnutrition. Patient received instructions from the registered dietitian in reference to the two week preoperative diet and the four phases of their postoperative diet. In addition I reviewed these instructions and stressed the importance of following these recommendations for their safety.      Patient completed the preoperative class where they were provided with education related to their bariatric surgery, common surgical complications, medications preoperatively & postoperatively, special concerns related to bariatric surgery postoperatively, vitamin supplementation, patient agreement, PAT & scheduling, hospital course, wellness discovery program and what to do in the case of an emergency postoperatively. The dietitians reviewed all preoperative and postoperative diet instructions. Patient was given the opportunity to ask questions during the group visit and these questions were answered by myself and/or the dietitian. I spent a total of 45 minutes on the day of the visit and over half of that time was spent counseling the patient on proper dietary behaviors, exercise and surgery protocols.

## 2023-02-22 NOTE — PROGRESS NOTES
Name_______________________________________Printed:____________________  Date and time of surgery__3/13 0830______________________Arrival Time:__0630______________   1. The instructions given regarding when and if a patient needs to stop oral intake prior to surgery varies. Follow the specific instructions you were given                  __x_Nothing to eat or to drink after Midnight the night before.                   ____Carbo loading or instructions will be given to select patients-if you have been given those instructions -please do the following                           The evening before your surgery after dinner before midnight drink 40 ounces of gatorade. If you are diabetic use sugar free. The morning of surgery drink 40 ounces of water. This needs to be finished 3 hours prior to your surgery start time. 2. Take the following pills with a small sip of water on the morning of surgery___inhaler if needed________________________________________________                  Do not take blood pressure medications ending in pril or sartan the ivelisse prior to surgery or the morning of surgery. Dr Erica Soriano patient are not to take any medications the AM of surgery. 3. Aspirin, Ibuprofen, Advil, Naproxen, Vitamin E and other Anti-inflammatory products and supplements should be stopped for 5 -7days before surgery or as directed by your physician. 4. Check with your Doctor regarding stopping Plavix, Coumadin,Eliquis, Lovenox,Effient,Pradaxa,Xarelto, Fragmin or other blood thinners and follow their instructions. 5. Do not smoke, and do not drink any alcoholic beverages 24 hours prior to surgery. This includes NA Beer. Refrain from the usage of any recreational drugs. 6. You may brush your teeth and gargle the morning of surgery. DO NOT SWALLOW WATER   7. You MUST make arrangements for a responsible adult to stay on site while you are here and take you home after your surgery.  You will not be allowed to leave alone or drive yourself home. It is strongly suggested someone stay with you the first 24 hrs. Your surgery will be cancelled if you do not have a ride home. 8. A parent/legal guardian must accompany a child scheduled for surgery and plan to stay at the hospital until the child is discharged. Please do not bring other children with you. 9. Please wear simple, loose fitting clothing to the hospital.  Ainsley Shaikh not bring valuables (money, credit cards, checkbooks, etc.) Do not wear any makeup (including no eye makeup) or nail polish on your fingers or toes. 10. DO NOT wear any jewelry or piercings on day of surgery. All body piercing jewelry must be removed. 11. If you have ___dentures, they will be removed before going to the OR; we will provide you a container. If you wear ___contact lenses or ___glasses, they will be removed; please bring a case for them. 12. Please see your family doctor/pediatrician for a history & physical and/or concerning medications. Bring any test results/reports from your physician's office. PCP__________________Phone___________H&P Appt. Date________             13 If you  have a Living Will and Durable Power of  for Healthcare, please bring in a copy. 15. Notify your Surgeon if you develop any illness between now and surgery  time, cough, cold, fever, sore throat, nausea, vomiting, etc.  Please notify your surgeon if you experience dizziness, shortness of breath or blurred vision between now & the time of your surgery             15. DO NOT shave your operative site 96 hours prior to surgery. For face & neck surgery, men may use an electric razor 48 hours prior to surgery. 16. Shower the night before or morning of surgery using an antibacterial soap or as you have been instructed. 17. To provide excellent care visitors will be limited to one in the room at any given time.              18.  Please bring picture ID and insurance card. 19.  Visit our web site for additional information:  [email or web address]              20.During flu season no children under the age of 15 are permitted in the hospital for the safety of all patients. 21. If you take a long acting insulin in the evening only  take half of your usual  dose the night  before your procedure              22. If you use a c-pap please bring DOS if staying overnight,             23.For your convenience [facility] Saldana has a pharmacy on site to fill your prescriptions. 24. If you use oxygen and have a portable tank please bring it  with you the DOS             25. Bring a complete list of all your medications with name and dose include any supplements. 32. Other__pcp daughter to call PAT and give information  PAts 2/3________________________________________   *Please call pre admission testing if you any further questions   Amy Camejo   [address] [place] [address]  [phone]   [address] [place] [address]       VISITOR POLICY(subject to change)    Current policy is 2 visitors per patient. No children. Mask is  at the discretion of the facility. Visiting hours are 8a-8p. Overnight visitors will be at the discretion of the nurse. All policies subject to change. All above information reviewed with patient in person or by phone. Patient verbalizes understanding. All questions and concerns addressed.                                                                                                  Patient/Rep__per phone with daughter  kendall__________________                                                                                                                                    PRE OP INSTRUCTIONS

## 2023-02-23 NOTE — PROGRESS NOTES
Patient Name:   Monica Correa       Type of Surgery:  Sleeve         Date of Surgery:  3/13/23       Start Pre-Op Diet On:  2/28/23       Start Clear Liquids On:  3/12/23         NPO after midnight the night before your surgery! Take morning medications with a small amount of water.     NOTES:_______________________________________  ______________________________________________

## 2023-02-28 ENCOUNTER — HOSPITAL ENCOUNTER (OUTPATIENT)
Age: 53
Discharge: HOME | End: 2023-02-28
Payer: COMMERCIAL

## 2023-02-28 ENCOUNTER — OFFICE VISIT (OUTPATIENT)
Dept: BARIATRICS/WEIGHT MGMT | Age: 53
End: 2023-02-28
Payer: COMMERCIAL

## 2023-02-28 VITALS — BODY MASS INDEX: 38.21 KG/M2 | HEIGHT: 64 IN | WEIGHT: 223.8 LBS

## 2023-02-28 DIAGNOSIS — I10 ESSENTIAL HYPERTENSION: ICD-10-CM

## 2023-02-28 DIAGNOSIS — Z01.818 PREOP TESTING: ICD-10-CM

## 2023-02-28 DIAGNOSIS — E66.01 SEVERE OBESITY (BMI 35.0-39.9) WITH COMORBIDITY (HCC): ICD-10-CM

## 2023-02-28 DIAGNOSIS — K21.9 CHRONIC GERD: Primary | ICD-10-CM

## 2023-02-28 DIAGNOSIS — E78.2 MIXED HYPERLIPIDEMIA: ICD-10-CM

## 2023-02-28 DIAGNOSIS — R73.03 PREDIABETES: ICD-10-CM

## 2023-02-28 LAB
A/G RATIO: 1.1 (ref 1.1–2.2)
ABO/RH: NORMAL
ALBUMIN SERPL-MCNC: 4 G/DL (ref 3.4–5)
ALP BLD-CCNC: 65 U/L (ref 40–129)
ALT SERPL-CCNC: 23 U/L (ref 10–40)
ANION GAP SERPL CALCULATED.3IONS-SCNC: 12 MMOL/L (ref 3–16)
ANTIBODY SCREEN: NORMAL
AST SERPL-CCNC: 18 U/L (ref 15–37)
BILIRUB SERPL-MCNC: <0.2 MG/DL (ref 0–1)
BUN BLDV-MCNC: 20 MG/DL (ref 7–20)
CALCIUM SERPL-MCNC: 9.3 MG/DL (ref 8.3–10.6)
CHLORIDE BLD-SCNC: 107 MMOL/L (ref 99–110)
CO2: 22 MMOL/L (ref 21–32)
CREAT SERPL-MCNC: 0.7 MG/DL (ref 0.6–1.1)
GFR SERPL CREATININE-BSD FRML MDRD: >60 ML/MIN/{1.73_M2}
GLUCOSE BLD-MCNC: 108 MG/DL (ref 70–99)
HCT VFR BLD CALC: 35.9 % (ref 36–48)
HEMOGLOBIN: 11.7 G/DL (ref 12–16)
MCH RBC QN AUTO: 28.1 PG (ref 26–34)
MCHC RBC AUTO-ENTMCNC: 32.5 G/DL (ref 31–36)
MCV RBC AUTO: 86.5 FL (ref 80–100)
PDW BLD-RTO: 14.4 % (ref 12.4–15.4)
PLATELET # BLD: 334 K/UL (ref 135–450)
PMV BLD AUTO: 8.7 FL (ref 5–10.5)
POTASSIUM SERPL-SCNC: 4.3 MMOL/L (ref 3.5–5.1)
RBC # BLD: 4.15 M/UL (ref 4–5.2)
SODIUM BLD-SCNC: 141 MMOL/L (ref 136–145)
TOTAL PROTEIN: 7.7 G/DL (ref 6.4–8.2)
WBC # BLD: 5.9 K/UL (ref 4–11)

## 2023-02-28 PROCEDURE — 80053 COMPREHEN METABOLIC PANEL: CPT

## 2023-02-28 PROCEDURE — 86901 BLOOD TYPING SEROLOGIC RH(D): CPT

## 2023-02-28 PROCEDURE — 86900 BLOOD TYPING SEROLOGIC ABO: CPT

## 2023-02-28 PROCEDURE — G8417 CALC BMI ABV UP PARAM F/U: HCPCS | Performed by: NURSE PRACTITIONER

## 2023-02-28 PROCEDURE — G8484 FLU IMMUNIZE NO ADMIN: HCPCS | Performed by: NURSE PRACTITIONER

## 2023-02-28 PROCEDURE — 1036F TOBACCO NON-USER: CPT | Performed by: NURSE PRACTITIONER

## 2023-02-28 PROCEDURE — 86850 RBC ANTIBODY SCREEN: CPT

## 2023-02-28 PROCEDURE — 99214 OFFICE O/P EST MOD 30 MIN: CPT | Performed by: NURSE PRACTITIONER

## 2023-02-28 PROCEDURE — 36415 COLL VENOUS BLD VENIPUNCTURE: CPT

## 2023-02-28 PROCEDURE — 85027 COMPLETE CBC AUTOMATED: CPT

## 2023-02-28 PROCEDURE — 3017F COLORECTAL CA SCREEN DOC REV: CPT | Performed by: NURSE PRACTITIONER

## 2023-02-28 PROCEDURE — G8427 DOCREV CUR MEDS BY ELIG CLIN: HCPCS | Performed by: NURSE PRACTITIONER

## 2023-03-07 NOTE — DISCHARGE INSTRUCTIONS
1.  Medications as directed. 2.  Follow-up with ENT call for an appointment on Monday as soon as possible. 3.  Return emerged department for severe worsening pain signs of infection or inability tolerate oral fluids. Billing Type: Third-Party Bill

## 2023-03-09 ENCOUNTER — TELEPHONE (OUTPATIENT)
Dept: BARIATRICS/WEIGHT MGMT | Age: 53
End: 2023-03-09

## 2023-03-09 NOTE — TELEPHONE ENCOUNTER
Thank you for calling and following up on preoperative diet and relaying Dr. Araiza Res message.   AYESHA CrawfordC

## 2023-03-09 NOTE — TELEPHONE ENCOUNTER
I spoke with Dr. Ricki Esposito and he would like one of the dietitians to give the daughter a call and check on her mother to make sure she is following the preoperative diet per our recommendations. You can also relay Dr. Marthenia Apgar response to the [facility] system.   Thank you,  AYESHA OakleyC

## 2023-03-09 NOTE — TELEPHONE ENCOUNTER
RD called and spoke with patients daughter Gabriella Downing and pt was present in background via speakerphone during phone call. RD relayed Dr. Anika Tse recommendations regarding to avoid [facility] Open Enema System, to only stick with information provided in her preop folder and that any deviation can cause complications or even death. Daughter verbalized understanding, reports pt has not used this cleanse for several years and wanted to call and ask before she did it - is aware to avoid. Daughter reports pt has been following preop diet as instructed. Reports she is drinking at least 64 oz of clears daily - 2 (32 oz) water bottles + 3 Nectar protein shakes each made with 8 oz of 1% milk, and that she is consuming one serving of each food from 6 food options from list. Pt appears to be following preop diet correctly at this time. Daughter reports pt is aware of the importance of following what is instructed as she knows if she does not her surgery will be cancelled and not rescheduled. Daughter wanted to clarify day before sx is just clear liquids - yes, and re-iterated no protein shakes this day - just clears and reviewed some examples of clear liquids. Also reminded daughter that there are multiple clear liquids lists in preop packet they can refer to. Daughter asking what time to arrive to hospital and where do they go when they get to hospital - MORGAN spoke with surgery scheduler and relayed they are to be there at 6:30 a.m. on Monday and to go to main hospital entrance and go to  and they will direct them. Daughter verbalized understanding.

## 2023-03-09 NOTE — TELEPHONE ENCOUNTER
Patient is scheduled for sleeve and hernia repair on Monday 3/13. Daughter is calling on moms behalf asking if she can do a \"stomach detox\"  using the [address]     Please advise  [phone]  Can leave message if no answer

## 2023-03-13 ENCOUNTER — ANESTHESIA (OUTPATIENT)
Dept: OPERATING ROOM | Age: 53
DRG: 403 | End: 2023-03-13
Payer: COMMERCIAL

## 2023-03-13 ENCOUNTER — ANESTHESIA EVENT (OUTPATIENT)
Dept: OPERATING ROOM | Age: 53
DRG: 403 | End: 2023-03-13
Payer: COMMERCIAL

## 2023-03-13 ENCOUNTER — HOSPITAL ENCOUNTER (INPATIENT)
Age: 53
LOS: 1 days | Discharge: HOME | DRG: 403 | End: 2023-03-14
Attending: SURGERY | Admitting: SURGERY
Payer: COMMERCIAL

## 2023-03-13 DIAGNOSIS — Z98.84 S/P LAPAROSCOPIC SLEEVE GASTRECTOMY: ICD-10-CM

## 2023-03-13 DIAGNOSIS — K43.9 VENTRAL HERNIA WITHOUT OBSTRUCTION OR GANGRENE: ICD-10-CM

## 2023-03-13 DIAGNOSIS — Z01.818 PREOP TESTING: Primary | ICD-10-CM

## 2023-03-13 DIAGNOSIS — E66.01 MORBID OBESITY (HCC): ICD-10-CM

## 2023-03-13 PROBLEM — E88.810 METABOLIC SYNDROME: Status: ACTIVE | Noted: 2023-03-13

## 2023-03-13 PROBLEM — E88.81 METABOLIC SYNDROME: Status: ACTIVE | Noted: 2023-03-13

## 2023-03-13 LAB
ABO/RH: NORMAL
ANTIBODY SCREEN: NORMAL
GLUCOSE BLD-MCNC: 112 MG/DL (ref 70–99)
GLUCOSE BLD-MCNC: 132 MG/DL (ref 70–99)
GLUCOSE BLD-MCNC: 160 MG/DL (ref 70–99)
GLUCOSE BLD-MCNC: 170 MG/DL (ref 70–99)
HCG(URINE) PREGNANCY TEST: NEGATIVE
PERFORMED ON: ABNORMAL

## 2023-03-13 PROCEDURE — 3700000000 HC ANESTHESIA ATTENDED CARE: Performed by: SURGERY

## 2023-03-13 PROCEDURE — 94150 VITAL CAPACITY TEST: CPT

## 2023-03-13 PROCEDURE — C9113 INJ PANTOPRAZOLE SODIUM, VIA: HCPCS | Performed by: SURGERY

## 2023-03-13 PROCEDURE — 86850 RBC ANTIBODY SCREEN: CPT

## 2023-03-13 PROCEDURE — 86900 BLOOD TYPING SEROLOGIC ABO: CPT

## 2023-03-13 PROCEDURE — 84703 CHORIONIC GONADOTROPIN ASSAY: CPT

## 2023-03-13 PROCEDURE — 7100000000 HC PACU RECOVERY - FIRST 15 MIN: Performed by: SURGERY

## 2023-03-13 PROCEDURE — 6370000000 HC RX 637 (ALT 250 FOR IP): Performed by: SURGERY

## 2023-03-13 PROCEDURE — 0WQF4ZZ REPAIR ABDOMINAL WALL, PERCUTANEOUS ENDOSCOPIC APPROACH: ICD-10-PCS | Performed by: SURGERY

## 2023-03-13 PROCEDURE — 94761 N-INVAS EAR/PLS OXIMETRY MLT: CPT

## 2023-03-13 PROCEDURE — 36415 COLL VENOUS BLD VENIPUNCTURE: CPT

## 2023-03-13 PROCEDURE — 2580000003 HC RX 258: Performed by: SURGERY

## 2023-03-13 PROCEDURE — 6360000002 HC RX W HCPCS: Performed by: SURGERY

## 2023-03-13 PROCEDURE — 88307 TISSUE EXAM BY PATHOLOGIST: CPT

## 2023-03-13 PROCEDURE — 2720000010 HC SURG SUPPLY STERILE: Performed by: SURGERY

## 2023-03-13 PROCEDURE — 49593 RPR AA HRN 1ST 3-10 RDC: CPT | Performed by: SURGERY

## 2023-03-13 PROCEDURE — 6360000002 HC RX W HCPCS: Performed by: NURSE ANESTHETIST, CERTIFIED REGISTERED

## 2023-03-13 PROCEDURE — 3700000001 HC ADD 15 MINUTES (ANESTHESIA): Performed by: SURGERY

## 2023-03-13 PROCEDURE — 2500000003 HC RX 250 WO HCPCS: Performed by: SURGERY

## 2023-03-13 PROCEDURE — 2700000000 HC OXYGEN THERAPY PER DAY

## 2023-03-13 PROCEDURE — 6360000002 HC RX W HCPCS: Performed by: ANESTHESIOLOGY

## 2023-03-13 PROCEDURE — A4217 STERILE WATER/SALINE, 500 ML: HCPCS | Performed by: SURGERY

## 2023-03-13 PROCEDURE — 2580000003 HC RX 258: Performed by: ANESTHESIOLOGY

## 2023-03-13 PROCEDURE — 86901 BLOOD TYPING SEROLOGIC RH(D): CPT

## 2023-03-13 PROCEDURE — 0DB64Z3 EXCISION OF STOMACH, PERCUTANEOUS ENDOSCOPIC APPROACH, VERTICAL: ICD-10-PCS | Performed by: SURGERY

## 2023-03-13 PROCEDURE — P9045 ALBUMIN (HUMAN), 5%, 250 ML: HCPCS | Performed by: NURSE ANESTHETIST, CERTIFIED REGISTERED

## 2023-03-13 PROCEDURE — 2709999900 HC NON-CHARGEABLE SUPPLY: Performed by: SURGERY

## 2023-03-13 PROCEDURE — 43775 LAP SLEEVE GASTRECTOMY: CPT | Performed by: SURGERY

## 2023-03-13 PROCEDURE — 7100000001 HC PACU RECOVERY - ADDTL 15 MIN: Performed by: SURGERY

## 2023-03-13 PROCEDURE — 3600000015 HC SURGERY LEVEL 5 ADDTL 15MIN: Performed by: SURGERY

## 2023-03-13 PROCEDURE — 3600000005 HC SURGERY LEVEL 5 BASE: Performed by: SURGERY

## 2023-03-13 PROCEDURE — 2500000003 HC RX 250 WO HCPCS: Performed by: NURSE ANESTHETIST, CERTIFIED REGISTERED

## 2023-03-13 PROCEDURE — 6360000002 HC RX W HCPCS

## 2023-03-13 PROCEDURE — 1200000000 HC SEMI PRIVATE

## 2023-03-13 RX ORDER — LABETALOL HYDROCHLORIDE 5 MG/ML
10 INJECTION, SOLUTION INTRAVENOUS
Status: DISCONTINUED | OUTPATIENT
Start: 2023-03-13 | End: 2023-03-13 | Stop reason: HOSPADM

## 2023-03-13 RX ORDER — HYDRALAZINE HYDROCHLORIDE 20 MG/ML
10 INJECTION INTRAMUSCULAR; INTRAVENOUS ONCE
Status: COMPLETED | OUTPATIENT
Start: 2023-03-13 | End: 2023-03-13

## 2023-03-13 RX ORDER — LIDOCAINE HYDROCHLORIDE 10 MG/ML
0.5 INJECTION, SOLUTION EPIDURAL; INFILTRATION; INTRACAUDAL; PERINEURAL ONCE
Status: DISCONTINUED | OUTPATIENT
Start: 2023-03-13 | End: 2023-03-13 | Stop reason: HOSPADM

## 2023-03-13 RX ORDER — FENTANYL CITRATE 50 UG/ML
INJECTION, SOLUTION INTRAMUSCULAR; INTRAVENOUS PRN
Status: DISCONTINUED | OUTPATIENT
Start: 2023-03-13 | End: 2023-03-13 | Stop reason: SDUPTHER

## 2023-03-13 RX ORDER — NALOXONE HYDROCHLORIDE 0.4 MG/ML
INJECTION, SOLUTION INTRAMUSCULAR; INTRAVENOUS; SUBCUTANEOUS PRN
Status: DISCONTINUED | OUTPATIENT
Start: 2023-03-13 | End: 2023-03-14

## 2023-03-13 RX ORDER — KETAMINE HYDROCHLORIDE 10 MG/ML
INJECTION INTRAMUSCULAR; INTRAVENOUS PRN
Status: DISCONTINUED | OUTPATIENT
Start: 2023-03-13 | End: 2023-03-13 | Stop reason: SDUPTHER

## 2023-03-13 RX ORDER — SODIUM CHLORIDE, SODIUM LACTATE, POTASSIUM CHLORIDE, CALCIUM CHLORIDE 600; 310; 30; 20 MG/100ML; MG/100ML; MG/100ML; MG/100ML
INJECTION, SOLUTION INTRAVENOUS CONTINUOUS
Status: DISCONTINUED | OUTPATIENT
Start: 2023-03-13 | End: 2023-03-13 | Stop reason: HOSPADM

## 2023-03-13 RX ORDER — VASOPRESSIN 20 U/ML
INJECTION PARENTERAL PRN
Status: DISCONTINUED | OUTPATIENT
Start: 2023-03-13 | End: 2023-03-13 | Stop reason: SDUPTHER

## 2023-03-13 RX ORDER — MAGNESIUM SULFATE HEPTAHYDRATE 500 MG/ML
INJECTION, SOLUTION INTRAMUSCULAR; INTRAVENOUS PRN
Status: DISCONTINUED | OUTPATIENT
Start: 2023-03-13 | End: 2023-03-13 | Stop reason: SDUPTHER

## 2023-03-13 RX ORDER — DIPHENHYDRAMINE HYDROCHLORIDE 50 MG/ML
12.5 INJECTION INTRAMUSCULAR; INTRAVENOUS EVERY 6 HOURS PRN
Status: DISCONTINUED | OUTPATIENT
Start: 2023-03-13 | End: 2023-03-14 | Stop reason: HOSPADM

## 2023-03-13 RX ORDER — ONDANSETRON 2 MG/ML
4 INJECTION INTRAMUSCULAR; INTRAVENOUS EVERY 6 HOURS PRN
Status: DISCONTINUED | OUTPATIENT
Start: 2023-03-13 | End: 2023-03-14 | Stop reason: HOSPADM

## 2023-03-13 RX ORDER — ROCURONIUM BROMIDE 10 MG/ML
INJECTION, SOLUTION INTRAVENOUS PRN
Status: DISCONTINUED | OUTPATIENT
Start: 2023-03-13 | End: 2023-03-13 | Stop reason: SDUPTHER

## 2023-03-13 RX ORDER — SCOLOPAMINE TRANSDERMAL SYSTEM 1 MG/1
1 PATCH, EXTENDED RELEASE TRANSDERMAL
Status: DISCONTINUED | OUTPATIENT
Start: 2023-03-16 | End: 2023-03-14 | Stop reason: HOSPADM

## 2023-03-13 RX ORDER — LIDOCAINE HYDROCHLORIDE 20 MG/ML
INJECTION, SOLUTION INFILTRATION; PERINEURAL PRN
Status: DISCONTINUED | OUTPATIENT
Start: 2023-03-13 | End: 2023-03-13 | Stop reason: SDUPTHER

## 2023-03-13 RX ORDER — ONDANSETRON 2 MG/ML
4 INJECTION INTRAMUSCULAR; INTRAVENOUS
Status: DISCONTINUED | OUTPATIENT
Start: 2023-03-13 | End: 2023-03-13 | Stop reason: HOSPADM

## 2023-03-13 RX ORDER — SODIUM CHLORIDE 9 MG/ML
INJECTION, SOLUTION INTRAVENOUS CONTINUOUS
Status: DISCONTINUED | OUTPATIENT
Start: 2023-03-13 | End: 2023-03-13 | Stop reason: HOSPADM

## 2023-03-13 RX ORDER — SODIUM CHLORIDE 0.9 % (FLUSH) 0.9 %
5-40 SYRINGE (ML) INJECTION EVERY 12 HOURS SCHEDULED
Status: DISCONTINUED | OUTPATIENT
Start: 2023-03-13 | End: 2023-03-14 | Stop reason: HOSPADM

## 2023-03-13 RX ORDER — PROMETHAZINE HYDROCHLORIDE 25 MG/ML
6.25 INJECTION, SOLUTION INTRAMUSCULAR; INTRAVENOUS EVERY 6 HOURS PRN
Status: DISCONTINUED | OUTPATIENT
Start: 2023-03-13 | End: 2023-03-14 | Stop reason: HOSPADM

## 2023-03-13 RX ORDER — ALBUMIN, HUMAN INJ 5% 5 %
SOLUTION INTRAVENOUS PRN
Status: DISCONTINUED | OUTPATIENT
Start: 2023-03-13 | End: 2023-03-13 | Stop reason: SDUPTHER

## 2023-03-13 RX ORDER — GLYCOPYRROLATE 0.2 MG/ML
INJECTION INTRAMUSCULAR; INTRAVENOUS PRN
Status: DISCONTINUED | OUTPATIENT
Start: 2023-03-13 | End: 2023-03-13 | Stop reason: SDUPTHER

## 2023-03-13 RX ORDER — ENOXAPARIN SODIUM 100 MG/ML
30 INJECTION SUBCUTANEOUS ONCE
Status: COMPLETED | OUTPATIENT
Start: 2023-03-13 | End: 2023-03-13

## 2023-03-13 RX ORDER — ENOXAPARIN SODIUM 100 MG/ML
30 INJECTION SUBCUTANEOUS EVERY 12 HOURS SCHEDULED
Status: DISCONTINUED | OUTPATIENT
Start: 2023-03-13 | End: 2023-03-14 | Stop reason: HOSPADM

## 2023-03-13 RX ORDER — HYDROMORPHONE HCL 110MG/55ML
0.5 PATIENT CONTROLLED ANALGESIA SYRINGE INTRAVENOUS EVERY 5 MIN PRN
Status: COMPLETED | OUTPATIENT
Start: 2023-03-13 | End: 2023-03-13

## 2023-03-13 RX ORDER — HYOSCYAMINE SULFATE 0.5 MG/ML
250 INJECTION, SOLUTION SUBCUTANEOUS EVERY 4 HOURS PRN
Status: DISCONTINUED | OUTPATIENT
Start: 2023-03-13 | End: 2023-03-14 | Stop reason: HOSPADM

## 2023-03-13 RX ORDER — HYDRALAZINE HYDROCHLORIDE 20 MG/ML
10 INJECTION INTRAMUSCULAR; INTRAVENOUS
Status: DISCONTINUED | OUTPATIENT
Start: 2023-03-13 | End: 2023-03-13 | Stop reason: HOSPADM

## 2023-03-13 RX ORDER — MIDAZOLAM HYDROCHLORIDE 1 MG/ML
INJECTION INTRAMUSCULAR; INTRAVENOUS PRN
Status: DISCONTINUED | OUTPATIENT
Start: 2023-03-13 | End: 2023-03-13 | Stop reason: SDUPTHER

## 2023-03-13 RX ORDER — PROPOFOL 10 MG/ML
INJECTION, EMULSION INTRAVENOUS PRN
Status: DISCONTINUED | OUTPATIENT
Start: 2023-03-13 | End: 2023-03-13 | Stop reason: SDUPTHER

## 2023-03-13 RX ORDER — ALBUTEROL SULFATE 90 UG/1
2 AEROSOL, METERED RESPIRATORY (INHALATION) 4 TIMES DAILY PRN
Status: DISCONTINUED | OUTPATIENT
Start: 2023-03-13 | End: 2023-03-14 | Stop reason: HOSPADM

## 2023-03-13 RX ORDER — SODIUM CHLORIDE 9 MG/ML
INJECTION, SOLUTION INTRAVENOUS PRN
Status: DISCONTINUED | OUTPATIENT
Start: 2023-03-13 | End: 2023-03-14 | Stop reason: HOSPADM

## 2023-03-13 RX ORDER — ONDANSETRON 2 MG/ML
INJECTION INTRAMUSCULAR; INTRAVENOUS PRN
Status: DISCONTINUED | OUTPATIENT
Start: 2023-03-13 | End: 2023-03-13 | Stop reason: SDUPTHER

## 2023-03-13 RX ORDER — HYDRALAZINE HYDROCHLORIDE 20 MG/ML
INJECTION INTRAMUSCULAR; INTRAVENOUS
Status: COMPLETED
Start: 2023-03-13 | End: 2023-03-13

## 2023-03-13 RX ORDER — HYDRALAZINE HYDROCHLORIDE 20 MG/ML
10 INJECTION INTRAMUSCULAR; INTRAVENOUS
Status: DISCONTINUED | OUTPATIENT
Start: 2023-03-13 | End: 2023-03-14 | Stop reason: HOSPADM

## 2023-03-13 RX ORDER — LIDOCAINE 4 G/G
1 PATCH TOPICAL DAILY
Status: DISCONTINUED | OUTPATIENT
Start: 2023-03-13 | End: 2023-03-14 | Stop reason: HOSPADM

## 2023-03-13 RX ORDER — MEPERIDINE HYDROCHLORIDE 25 MG/ML
12.5 INJECTION INTRAMUSCULAR; INTRAVENOUS; SUBCUTANEOUS EVERY 5 MIN PRN
Status: DISCONTINUED | OUTPATIENT
Start: 2023-03-13 | End: 2023-03-13 | Stop reason: HOSPADM

## 2023-03-13 RX ORDER — HYDROMORPHONE HYDROCHLORIDE 1 MG/ML
0.5 INJECTION, SOLUTION INTRAMUSCULAR; INTRAVENOUS; SUBCUTANEOUS
Status: DISCONTINUED | OUTPATIENT
Start: 2023-03-13 | End: 2023-03-14 | Stop reason: HOSPADM

## 2023-03-13 RX ORDER — SODIUM CHLORIDE, SODIUM LACTATE, POTASSIUM CHLORIDE, CALCIUM CHLORIDE 600; 310; 30; 20 MG/100ML; MG/100ML; MG/100ML; MG/100ML
INJECTION, SOLUTION INTRAVENOUS CONTINUOUS
Status: DISCONTINUED | OUTPATIENT
Start: 2023-03-13 | End: 2023-03-14 | Stop reason: HOSPADM

## 2023-03-13 RX ORDER — SUCCINYLCHOLINE CHLORIDE 20 MG/ML
INJECTION INTRAMUSCULAR; INTRAVENOUS PRN
Status: DISCONTINUED | OUTPATIENT
Start: 2023-03-13 | End: 2023-03-13 | Stop reason: SDUPTHER

## 2023-03-13 RX ORDER — METOCLOPRAMIDE HYDROCHLORIDE 5 MG/ML
10 INJECTION INTRAMUSCULAR; INTRAVENOUS EVERY 6 HOURS PRN
Status: DISCONTINUED | OUTPATIENT
Start: 2023-03-13 | End: 2023-03-14 | Stop reason: HOSPADM

## 2023-03-13 RX ORDER — SCOLOPAMINE TRANSDERMAL SYSTEM 1 MG/1
1 PATCH, EXTENDED RELEASE TRANSDERMAL ONCE
Status: DISCONTINUED | OUTPATIENT
Start: 2023-03-13 | End: 2023-03-13

## 2023-03-13 RX ORDER — LIDOCAINE HYDROCHLORIDE 10 MG/ML
1 INJECTION, SOLUTION EPIDURAL; INFILTRATION; INTRACAUDAL; PERINEURAL
Status: CANCELLED | OUTPATIENT
Start: 2023-03-13 | End: 2023-03-14

## 2023-03-13 RX ORDER — SODIUM CHLORIDE 0.9 % (FLUSH) 0.9 %
5-40 SYRINGE (ML) INJECTION PRN
Status: DISCONTINUED | OUTPATIENT
Start: 2023-03-13 | End: 2023-03-14 | Stop reason: HOSPADM

## 2023-03-13 RX ORDER — ACETAMINOPHEN 160 MG/5ML
650 SOLUTION ORAL ONCE
Status: COMPLETED | OUTPATIENT
Start: 2023-03-13 | End: 2023-03-13

## 2023-03-13 RX ORDER — DEXAMETHASONE SODIUM PHOSPHATE 4 MG/ML
INJECTION, SOLUTION INTRA-ARTICULAR; INTRALESIONAL; INTRAMUSCULAR; INTRAVENOUS; SOFT TISSUE PRN
Status: DISCONTINUED | OUTPATIENT
Start: 2023-03-13 | End: 2023-03-13 | Stop reason: SDUPTHER

## 2023-03-13 RX ORDER — MAGNESIUM HYDROXIDE 1200 MG/15ML
LIQUID ORAL CONTINUOUS PRN
Status: COMPLETED | OUTPATIENT
Start: 2023-03-13 | End: 2023-03-13

## 2023-03-13 RX ORDER — APREPITANT 80 MG/1
80 CAPSULE ORAL ONCE
Status: COMPLETED | OUTPATIENT
Start: 2023-03-13 | End: 2023-03-13

## 2023-03-13 RX ORDER — LABETALOL HYDROCHLORIDE 5 MG/ML
10 INJECTION, SOLUTION INTRAVENOUS
Status: DISCONTINUED | OUTPATIENT
Start: 2023-03-13 | End: 2023-03-14 | Stop reason: HOSPADM

## 2023-03-13 RX ORDER — SODIUM CHLORIDE, SODIUM LACTATE, POTASSIUM CHLORIDE, CALCIUM CHLORIDE 600; 310; 30; 20 MG/100ML; MG/100ML; MG/100ML; MG/100ML
INJECTION, SOLUTION INTRAVENOUS CONTINUOUS
Status: CANCELLED | OUTPATIENT
Start: 2023-03-13

## 2023-03-13 RX ADMIN — ROCURONIUM BROMIDE 40 MG: 10 INJECTION, SOLUTION INTRAVENOUS at 08:41

## 2023-03-13 RX ADMIN — PHENYLEPHRINE HYDROCHLORIDE 200 MCG: 10 INJECTION INTRAVENOUS at 09:18

## 2023-03-13 RX ADMIN — ALBUMIN (HUMAN) 250 ML: 12.5 INJECTION, SOLUTION INTRAVENOUS at 09:10

## 2023-03-13 RX ADMIN — ROCURONIUM BROMIDE 10 MG: 10 INJECTION, SOLUTION INTRAVENOUS at 08:34

## 2023-03-13 RX ADMIN — Medication: at 10:36

## 2023-03-13 RX ADMIN — PHENYLEPHRINE HYDROCHLORIDE 200 MCG: 10 INJECTION INTRAVENOUS at 08:51

## 2023-03-13 RX ADMIN — GLYCOPYRROLATE 0.2 MG: 0.2 INJECTION, SOLUTION INTRAMUSCULAR; INTRAVENOUS at 08:30

## 2023-03-13 RX ADMIN — VASOPRESSIN 3 UNITS: 20 INJECTION INTRAVENOUS at 09:29

## 2023-03-13 RX ADMIN — PHENYLEPHRINE HYDROCHLORIDE 100 MCG: 10 INJECTION INTRAVENOUS at 08:38

## 2023-03-13 RX ADMIN — MAGNESIUM SULFATE HEPTAHYDRATE 1 G: 500 INJECTION, SOLUTION INTRAMUSCULAR; INTRAVENOUS at 08:28

## 2023-03-13 RX ADMIN — PHENYLEPHRINE HYDROCHLORIDE 200 MCG: 10 INJECTION INTRAVENOUS at 09:22

## 2023-03-13 RX ADMIN — VASOPRESSIN 2 UNITS: 20 INJECTION INTRAVENOUS at 09:11

## 2023-03-13 RX ADMIN — SODIUM CHLORIDE, POTASSIUM CHLORIDE, SODIUM LACTATE AND CALCIUM CHLORIDE: 600; 310; 30; 20 INJECTION, SOLUTION INTRAVENOUS at 07:12

## 2023-03-13 RX ADMIN — VASOPRESSIN 2 UNITS: 20 INJECTION INTRAVENOUS at 09:06

## 2023-03-13 RX ADMIN — KETAMINE HYDROCHLORIDE 25 MG: 10 INJECTION, SOLUTION INTRAMUSCULAR; INTRAVENOUS at 08:55

## 2023-03-13 RX ADMIN — PHENYLEPHRINE HYDROCHLORIDE 200 MCG: 10 INJECTION INTRAVENOUS at 09:32

## 2023-03-13 RX ADMIN — ACETAMINOPHEN 650 MG: 650 SOLUTION ORAL at 07:01

## 2023-03-13 RX ADMIN — ENOXAPARIN SODIUM 30 MG: 100 INJECTION SUBCUTANEOUS at 07:01

## 2023-03-13 RX ADMIN — HYDROMORPHONE HYDROCHLORIDE 0.5 MG: 2 INJECTION, SOLUTION INTRAMUSCULAR; INTRAVENOUS; SUBCUTANEOUS at 10:22

## 2023-03-13 RX ADMIN — SODIUM CHLORIDE, PRESERVATIVE FREE 40 MG: 5 INJECTION INTRAVENOUS at 12:15

## 2023-03-13 RX ADMIN — FENTANYL CITRATE 50 MCG: 50 INJECTION, SOLUTION INTRAMUSCULAR; INTRAVENOUS at 08:34

## 2023-03-13 RX ADMIN — HYDROMORPHONE HYDROCHLORIDE 0.5 MG: 2 INJECTION, SOLUTION INTRAMUSCULAR; INTRAVENOUS; SUBCUTANEOUS at 10:05

## 2023-03-13 RX ADMIN — PHENYLEPHRINE HYDROCHLORIDE 200 MCG: 10 INJECTION INTRAVENOUS at 08:57

## 2023-03-13 RX ADMIN — ALBUMIN (HUMAN) 250 ML: 12.5 INJECTION, SOLUTION INTRAVENOUS at 09:00

## 2023-03-13 RX ADMIN — HYDRALAZINE HYDROCHLORIDE 10 MG: 20 INJECTION INTRAMUSCULAR; INTRAVENOUS at 07:35

## 2023-03-13 RX ADMIN — ONDANSETRON 4 MG: 2 INJECTION INTRAMUSCULAR; INTRAVENOUS at 08:41

## 2023-03-13 RX ADMIN — PHENYLEPHRINE HYDROCHLORIDE 200 MCG: 10 INJECTION INTRAVENOUS at 09:27

## 2023-03-13 RX ADMIN — PHENYLEPHRINE HYDROCHLORIDE 200 MCG: 10 INJECTION INTRAVENOUS at 08:46

## 2023-03-13 RX ADMIN — HYDROMORPHONE HYDROCHLORIDE 0.5 MG: 2 INJECTION, SOLUTION INTRAMUSCULAR; INTRAVENOUS; SUBCUTANEOUS at 10:35

## 2023-03-13 RX ADMIN — SUCCINYLCHOLINE CHLORIDE 200 MG: 20 INJECTION, SOLUTION INTRAMUSCULAR; INTRAVENOUS at 08:35

## 2023-03-13 RX ADMIN — FENTANYL CITRATE 50 MCG: 50 INJECTION, SOLUTION INTRAMUSCULAR; INTRAVENOUS at 08:42

## 2023-03-13 RX ADMIN — PHENYLEPHRINE HYDROCHLORIDE 200 MCG: 10 INJECTION INTRAVENOUS at 09:13

## 2023-03-13 RX ADMIN — SODIUM CHLORIDE, POTASSIUM CHLORIDE, SODIUM LACTATE AND CALCIUM CHLORIDE: 600; 310; 30; 20 INJECTION, SOLUTION INTRAVENOUS at 10:39

## 2023-03-13 RX ADMIN — VASOPRESSIN 2 UNITS: 20 INJECTION INTRAVENOUS at 09:33

## 2023-03-13 RX ADMIN — DEXAMETHASONE SODIUM PHOSPHATE 4 MG: 4 INJECTION, SOLUTION INTRAMUSCULAR; INTRAVENOUS at 08:39

## 2023-03-13 RX ADMIN — VASOPRESSIN 3 UNITS: 20 INJECTION INTRAVENOUS at 09:25

## 2023-03-13 RX ADMIN — VASOPRESSIN 2 UNITS: 20 INJECTION INTRAVENOUS at 09:37

## 2023-03-13 RX ADMIN — MIDAZOLAM 2 MG: 1 INJECTION INTRAMUSCULAR; INTRAVENOUS at 08:25

## 2023-03-13 RX ADMIN — HYDROMORPHONE HYDROCHLORIDE 0.5 MG: 2 INJECTION, SOLUTION INTRAMUSCULAR; INTRAVENOUS; SUBCUTANEOUS at 10:17

## 2023-03-13 RX ADMIN — ENOXAPARIN SODIUM 30 MG: 100 INJECTION SUBCUTANEOUS at 20:34

## 2023-03-13 RX ADMIN — KETAMINE HYDROCHLORIDE 25 MG: 10 INJECTION, SOLUTION INTRAMUSCULAR; INTRAVENOUS at 08:45

## 2023-03-13 RX ADMIN — SUGAMMADEX 200 MG: 100 INJECTION, SOLUTION INTRAVENOUS at 09:36

## 2023-03-13 RX ADMIN — VASOPRESSIN 2 UNITS: 20 INJECTION INTRAVENOUS at 09:21

## 2023-03-13 RX ADMIN — LIDOCAINE HYDROCHLORIDE 100 MG: 20 INJECTION, SOLUTION INFILTRATION; PERINEURAL at 08:34

## 2023-03-13 RX ADMIN — PHENYLEPHRINE HYDROCHLORIDE 200 MCG: 10 INJECTION INTRAVENOUS at 09:02

## 2023-03-13 RX ADMIN — VASOPRESSIN 1 UNITS: 20 INJECTION INTRAVENOUS at 08:55

## 2023-03-13 RX ADMIN — DIPHENHYDRAMINE HYDROCHLORIDE 12.5 MG: 50 INJECTION, SOLUTION INTRAMUSCULAR; INTRAVENOUS at 20:56

## 2023-03-13 RX ADMIN — PHENYLEPHRINE HYDROCHLORIDE 200 MCG: 10 INJECTION INTRAVENOUS at 09:08

## 2023-03-13 RX ADMIN — PROPOFOL 200 MG: 10 INJECTION, EMULSION INTRAVENOUS at 08:34

## 2023-03-13 RX ADMIN — VASOPRESSIN 2 UNITS: 20 INJECTION INTRAVENOUS at 09:16

## 2023-03-13 RX ADMIN — APREPITANT 80 MG: 80 CAPSULE ORAL at 07:01

## 2023-03-13 RX ADMIN — VASOPRESSIN 1 UNITS: 20 INJECTION INTRAVENOUS at 09:00

## 2023-03-13 ASSESSMENT — PAIN SCALES - GENERAL
PAINLEVEL_OUTOF10: 4
PAINLEVEL_OUTOF10: 2
PAINLEVEL_OUTOF10: 9
PAINLEVEL_OUTOF10: 8
PAINLEVEL_OUTOF10: 10

## 2023-03-13 ASSESSMENT — LIFESTYLE VARIABLES: SMOKING_STATUS: 0

## 2023-03-13 ASSESSMENT — PAIN DESCRIPTION - PAIN TYPE: TYPE: SURGICAL PAIN

## 2023-03-13 ASSESSMENT — PAIN DESCRIPTION - DESCRIPTORS: DESCRIPTORS: SORE

## 2023-03-13 ASSESSMENT — PAIN - FUNCTIONAL ASSESSMENT: PAIN_FUNCTIONAL_ASSESSMENT: NONE - DENIES PAIN

## 2023-03-13 ASSESSMENT — PAIN DESCRIPTION - LOCATION
LOCATION: ABDOMEN
LOCATION: ABDOMEN

## 2023-03-13 ASSESSMENT — PAIN DESCRIPTION - ORIENTATION: ORIENTATION: UPPER

## 2023-03-13 NOTE — PLAN OF CARE
Problem: Safety - Adult  Goal: Free from fall injury  Outcome: Progressing     Problem: Pain  Goal: Verbalizes/displays adequate comfort level or baseline comfort level  Outcome: Progressing     Problem: ABCDS Injury Assessment  Goal: Absence of physical injury  Outcome: Progressing     Problem: Discharge Planning  Goal: Discharge to home or other facility with appropriate resources  Outcome: Progressing     Problem: Chronic Conditions and Co-morbidities  Goal: Patient's chronic conditions and co-morbidity symptoms are monitored and maintained or improved  Outcome: Progressing

## 2023-03-13 NOTE — PROGRESS NOTES
Patient asleep, awakens to voice, c/o pain but dozes back off. VSS. Abd soft, lap sites intact. Phase 1 recovery met, will transfer to T.

## 2023-03-13 NOTE — ANESTHESIA PRE PROCEDURE
Department of Anesthesiology  Preprocedure Note       Name:  Sherry Solis   Age:  52 y.o.  :  1970                                          MRN:  [id number]         Date:  3/13/2023      Surgeon: Octavio Blanco): Heaven Watts MD    Procedure: Procedure(s):  LAPAROSCOPIC SLEEVE GASTRECTOMY  LAPAROSCOPIC VENTRAL HERNIA REPAIR AND POSSIBLE OTHER INDICATED PROCEDURES    Medications prior to admission:   Prior to Admission medications    Medication Sig Start Date End Date Taking? Authorizing Provider   linaclotide Hunter Lowing) 145 MCG capsule Take 145 mcg by mouth every morning (before breakfast) 22   Historical Provider, MD   omeprazole (PRILOSEC) 20 MG delayed release capsule TAKE 1 CAPSULE BY MOUTH ONCE DAILY IN THE MORNING BEFORE BREAKFAST 23   Heaven Watts MD   diclofenac sodium (VOLTAREN) 1 % GEL Apply 2 g topically in the morning and 2 g at noon and 2 g in the evening and 2 g before bedtime.  22   NORMAN Damian CNP   diphenhydrAMINE-APAP, sleep, (TYLENOL PM EXTRA STRENGTH PO) Take 2 tablets by mouth as needed    Historical Provider, MD   NIFEdipine (ADALAT CC) 30 MG extended release tablet Take 30 mg by mouth daily Patient states stopped taking on her own 21   Historical Provider, MD   albuterol sulfate HFA (VENTOLIN HFA) 108 (90 Base) MCG/ACT inhaler Inhale 2 puffs into the lungs 4 times daily as needed for Wheezing or Shortness of Breath 10/2/21   NORMAN Damian CNP       Current medications:    Current Facility-Administered Medications   Medication Dose Route Frequency Provider Last Rate Last Admin    0.9 % sodium chloride infusion   IntraVENous Continuous Heaven Watts MD        lidocaine PF 1 % injection 0.5 mL  0.5 mL IntraDERmal Once Heaven Watts MD        ceFAZolin (ANCEF) 2,000 mg in sodium chloride 0.9 % 50 mL IVPB (mini-bag)  2,000 mg IntraVENous On Call to Mani Skaggs MD        scopolamine (TRANSDERM-SCOP) transdermal patch 1 patch  1 patch TransDERmal Once Heaven Watts MD   1 patch at 03/13/23 0701    lactated ringers IV soln infusion   IntraVENous Continuous Abdifatah Perdomo  mL/hr at 03/13/23 0712 New Bag at 03/13/23 0712     Facility-Administered Medications Ordered in Other Encounters   Medication Dose Route Frequency Provider Last Rate Last Admin    0.9 % sodium chloride infusion   IntraVENous Continuous Heaven Watts MD           Allergies:  No Known Allergies    Problem List:    Patient Active Problem List   Diagnosis Code    Lumbar spondylosis M47.816    Lumbar pain with radiation down both legs M54.50, M79.604, M79.605    Severe obesity (BMI 35.0-39. 9) with comorbidity ([facility] [address] E66.01    Morbid obesity with BMI of 40.0-44.9, adult ([facility]) E66.01, Z68.41    Chronic GERD K21.9    Essential hypertension I10    Prediabetes R73.03    Ventral hernia without obstruction or gangrene K43.9    Mixed hyperlipidemia E78.2       Past Medical History:        Diagnosis Date    Arthritis     Asthma     Diabetes mellitus ([facility] [address]     patient denies 1/2023    Hypertension     Obesity (BMI 30-39. 9)        Past Surgical History:        Procedure Laterality Date    UPPER GASTROINTESTINAL ENDOSCOPY N/A 6/24/2022    EGD BIOPSY performed by Heaven Watts MD at [address] [facility]  [address] History:    Social History     Tobacco Use    Smoking status: Never    Smokeless tobacco: Never   Substance Use Topics    Alcohol use:  No                                Counseling given: Not Answered      Vital Signs (Current):   Vitals:    02/22/23 1006 03/13/23 0718 03/13/23 0735   BP:  (!) 190/99 (!) 162/97   Pulse:  100    Resp:  16    Temp:  97 °F (36.1 °C)    TempSrc:  Temporal    SpO2:  100%    Weight: 217 lb (98.4 kg) 210 lb (95.3 kg)    Height: 5' 5\" (1.651 m)                                                BP Readings from Last 3 Encounters:   03/13/23 (!) 162/97   02/07/23 (!) 146/91   01/31/23 128/74       NPO Status: Time of last liquid consumption: 2200                        Time of last solid consumption: 2200                        Date of last liquid consumption: 03/12/23                        Date of last solid food consumption: 03/11/23    BMI:   Wt Readings from Last 3 Encounters:   03/13/23 210 lb (95.3 kg)   02/28/23 223 lb 12.8 oz (101.5 kg)   02/07/23 223 lb 5.2 oz (101.3 kg)     Body mass index is 36.05 kg/m².    CBC:   Lab Results   Component Value Date/Time    WBC 5.9 02/28/2023 12:58 PM    RBC 4.15 02/28/2023 12:58 PM    HGB 11.7 02/28/2023 12:58 PM    HCT 35.9 02/28/2023 12:58 PM    MCV 86.5 02/28/2023 12:58 PM    RDW 14.4 02/28/2023 12:58 PM     02/28/2023 12:58 PM       CMP:   Lab Results   Component Value Date/Time     02/28/2023 12:58 PM    K 4.3 02/28/2023 12:58 PM    K 4.2 12/25/2022 02:59 PM     02/28/2023 12:58 PM    CO2 22 02/28/2023 12:58 PM    BUN 20 02/28/2023 12:58 PM    CREATININE 0.7 02/28/2023 12:58 PM    GFRAA >60 03/08/2022 08:46 AM    AGRATIO 1.1 02/28/2023 12:58 PM    LABGLOM >60 02/28/2023 12:58 PM    GLUCOSE 108 02/28/2023 12:58 PM    PROT 7.7 02/28/2023 12:58 PM    CALCIUM 9.3 02/28/2023 12:58 PM    BILITOT <0.2 02/28/2023 12:58 PM    ALKPHOS 65 02/28/2023 12:58 PM    AST 18 02/28/2023 12:58 PM    ALT 23 02/28/2023 12:58 PM       POC Tests:   Recent Labs     03/13/23  0704   POCGLU 112*       Coags:   Lab Results   Component Value Date/Time    PROTIME 11.9 03/08/2022 08:46 AM    INR 1.05 03/08/2022 08:46 AM    APTT 25.8 08/16/2015 04:15 PM       HCG (If Applicable):   Lab Results   Component Value Date    PREGTESTUR Negative 03/13/2023        ABGs: No results found for: PHART, PO2ART, RRG4YUE, QDB8TKA, BEART, T6AZXAOV     Type & Screen (If Applicable):  No results found for: LABABO, LABRH    Drug/Infectious Status (If Applicable):  No results found for: HIV, HEPCAB    COVID-19 Screening (If Applicable): No results found for: COVID19        Anesthesia Evaluation  Patient summary  reviewed and Nursing notes reviewed no history of anesthetic complications:   Airway: Mallampati: III  TM distance: >3 FB   Neck ROM: full  Mouth opening: > = 3 FB   Dental: normal exam         Pulmonary:normal exam  breath sounds clear to auscultation  (+) asthma (rare inh use, no recent issues/exacerbations):     (-) sleep apnea and not a current smoker                           Cardiovascular:    (+) hypertension:, hyperlipidemia    (-) past MI, CAD, CABG/stent, dysrhythmias,  angina and  CHF (echo 2022 EF 55-60 gr 1 dd)    ECG reviewed  Rhythm: regular  Rate: normal  Echocardiogram reviewed                  Neuro/Psych:   (+) neuromuscular disease (lumbar ddd):,    (-) seizures, TIA and CVA           GI/Hepatic/Renal:   (+) GERD: well controlled,      (-) liver disease and no renal disease       Endo/Other:        (-) diabetes mellitus (pt denies), hypothyroidism, hyperthyroidism               Abdominal:   (+) obese,           Vascular: Other Findings:           Anesthesia Plan      general     ASA 2       Induction: intravenous. MIPS: Postoperative opioids intended and Prophylactic antiemetics administered. Anesthetic plan and risks discussed with patient. Plan discussed with CRNA.                     Iglesia Penaloza MD   3/13/2023

## 2023-03-13 NOTE — CARE COORDINATION
Discharge Planning Note:    Chart reviewed and it appears that patient has minimal needs for discharge at this time. Discussed with patients nurse and requested that case management be notified if discharge needs are identified.     - Current discharge plan is for the patient to return home. Case management will continue to follow progress and update discharge plan as needed.       Risk of Readmission Score: 6%    LUIS A Lindsay RN    [facility]  Phone: [phone]

## 2023-03-13 NOTE — PROGRESS NOTES
Incentive Spirometry education and demonstration completed by Respiratory Therapy Yes      Response to education: Good     Teaching Time: 5 minutes    Minimum Predicted Vital Capacity - 570 mL. Patient's Actual Vital Capacity - 750 mL. Turning over to Nursing for routine follow-up Yes.     Comments: N/A    Electronically signed by Cisoc Ortiz RCP on 3/13/2023 at 12:41 PM

## 2023-03-13 NOTE — ANESTHESIA POSTPROCEDURE EVALUATION
Department of Anesthesiology  Postprocedure Note    Patient: Adonay Kaur  MRN: [id number]  YOB: 1970  Date of evaluation: 3/13/2023      Procedure Summary     Date: 03/13/23 Room / Location: [facility] OR [address] [place] [address]    Anesthesia Start: 3754 Anesthesia Stop: 0956    Procedures:       LAPAROSCOPIC SLEEVE GASTRECTOMY (Abdomen)      LAPAROSCOPIC VENTRAL HERNIA REPAIR (Abdomen) Diagnosis:       Morbid obesity ([address]      Ventral hernia without obstruction or gangrene      (Morbid obesity ([address] [E66.01])      (Ventral hernia without obstruction or gangrene [K43.9])    Surgeons: Mali Salazar MD Responsible Provider: Avery Browning MD    Anesthesia Type: general ASA Status: 2          Anesthesia Type: No value filed.     Donaldo Phase I: Donaldo Score: 8    Donaldo Phase II:        Anesthesia Post Evaluation    Patient location during evaluation: PACU  Patient participation: complete - patient participated  Level of consciousness: awake and alert  Airway patency: patent  Nausea & Vomiting: no vomiting and no nausea  Complications: no  Cardiovascular status: hemodynamically stable  Respiratory status: acceptable  Hydration status: stable

## 2023-03-13 NOTE — H&P
[facility]) Physicians   [employer]  Keegan Mayfield MD, [facility], [address], [address], [address] [facility] [address] . Phone: [phone]  Fax: [phone]            Patient's History and Physical from March 6, 2023 was reviewed. Mable Mcallister seen and examined. There has been no change. Patient Active Problem List    Diagnosis Date Noted    Metabolic syndrome 88/46/6104     Priority: Medium    Mixed hyperlipidemia 04/07/2022    Morbid obesity with BMI of 40.0-44.9, adult ([facility] [address] 03/01/2022    Chronic GERD 03/01/2022    Essential hypertension 03/01/2022    Prediabetes 03/01/2022    Ventral hernia without obstruction or gangrene 03/01/2022    Severe obesity (BMI 35.0-39. 9) with comorbidity ([facility] [address]     Lumbar spondylosis 08/30/2021    Lumbar pain with radiation down both legs 08/30/2021             HISTORY OF PRESENT ILLNESS:    Tariq Garza is a very pleasant 52 y.o. with Body mass index is 36.05 kg/m². who is presenting for planned Laparoscopic Sleeve Gastrectomy for future weight loss. Past Medical History:        Diagnosis Date    Arthritis     Asthma     Diabetes mellitus ([facility] [address]     patient denies 1/2023    Hypertension     Obesity (BMI 30-39. 9)      Past Surgical History:        Procedure Laterality Date    UPPER GASTROINTESTINAL ENDOSCOPY N/A 6/24/2022    EGD BIOPSY performed by Santos Sanchez MD at [address] [place] [address]     Medications Prior to Admission:   Medications Prior to Admission: linaclotide (LINZESS) 145 MCG capsule, Take 145 mcg by mouth every morning (before breakfast)  omeprazole (PRILOSEC) 20 MG delayed release capsule, TAKE 1 CAPSULE BY MOUTH ONCE DAILY IN THE MORNING BEFORE BREAKFAST  diclofenac sodium (VOLTAREN) 1 % GEL, Apply 2 g topically in the morning and 2 g at noon and 2 g in the evening and 2 g before bedtime.   diphenhydrAMINE-APAP, sleep, (TYLENOL PM EXTRA STRENGTH PO), Take 2 tablets by mouth as needed  NIFEdipine (ADALAT CC) 30 MG extended release tablet, Take 30 mg by mouth daily Patient states stopped taking on her own  albuterol sulfate HFA (VENTOLIN HFA) 108 (90 Base) MCG/ACT inhaler, Inhale 2 puffs into the lungs 4 times daily as needed for Wheezing or Shortness of Breath    Allergies:  Patient has no known allergies. Social History:   TOBACCO:   reports that she has never smoked. She has never used smokeless tobacco.  ETOH:   reports no history of alcohol use. Family History:   History reviewed. No pertinent family history. REVIEW OF SYSTEMS:    Review of Systems - History obtained from the patient  General ROS: obesity  Psychological ROS: negative  Ophthalmic ROS: negative  Neurological ROS: negative  ENT ROS: negative  Allergy and Immunology ROS: negative  Hematological and Lymphatic ROS: negative  Endocrine ROS: negative  Breast ROS: negative  Respiratory ROS: negative  Cardiovascular ROS: negative  Gastrointestinal ROS:negative  Genito-Urinary ROS: negative  Musculoskeletal ROS: negative   Skin ROS: negative      PHYSICAL EXAM:      BP (!) 162/97   Pulse 100   Temp 97 °F (36.1 °C) (Temporal)   Resp 16   Ht 5' 5\" (1.651 m)   Wt 210 lb (95.3 kg)   LMP 06/10/2020   SpO2 100%   BMI 36.05 kg/m²  I        Physical Exam   Vitals Reviewed   Constitutional: Patient is oriented to person, place, and time. Patient appears well-developed and well-nourished. Patient is active and cooperative. Non-toxic appearance. No distress. HENT:   Head: Normocephalic and atraumatic. Head is without abrasion and without laceration. Hair is normal.   Right Ear: External ear normal. No lacerations. No drainage, swelling . Left Ear: External ear normal. No lacerations. No drainage, swelling. Nose: Nose normal. No nose lacerations or nasal deformity. Eyes: Conjunctivae, EOM and lids are normal. Right eye exhibits no discharge. No foreign body present in the right eye. Left eye exhibits no discharge.  No foreign body present in the left eye. No scleral icterus. Neck: Trachea normal and normal range of motion. No JVD present. Pulmonary/Chest: Effort normal. No accessory muscle usage or stridor. No apnea. No respiratory distress. Cardiovascular: Normal rate and no JVD. Abdominal: Normal appearance. Patient exhibits no distension. Musculoskeletal: Normal range of motion. Patient exhibits no edema. Neurological: Patient is alert and oriented to person, place, and time. Patient has normal strength. GCS eye subscore is 4. GCS verbal subscore is 5. GCS motor subscore is 6. Skin: Skin is warm and dry. No abrasion and no rash noted. Patient is not diaphoretic. No cyanosis or erythema. Psychiatric: Patient has a normal mood and affect. Speech is normal and behavior is normal. Cognition and memory are normal.       DATA:  CBC:   Lab Results   Component Value Date/Time    WBC 5.9 02/28/2023 12:58 PM    RBC 4.15 02/28/2023 12:58 PM    HGB 11.7 02/28/2023 12:58 PM    HCT 35.9 02/28/2023 12:58 PM    MCV 86.5 02/28/2023 12:58 PM    MCH 28.1 02/28/2023 12:58 PM    MCHC 32.5 02/28/2023 12:58 PM    RDW 14.4 02/28/2023 12:58 PM     02/28/2023 12:58 PM    MPV 8.7 02/28/2023 12:58 PM     CMP:    Lab Results   Component Value Date/Time     02/28/2023 12:58 PM    K 4.3 02/28/2023 12:58 PM    K 4.2 12/25/2022 02:59 PM     02/28/2023 12:58 PM    CO2 22 02/28/2023 12:58 PM    BUN 20 02/28/2023 12:58 PM    CREATININE 0.7 02/28/2023 12:58 PM    GFRAA >60 03/08/2022 08:46 AM    AGRATIO 1.1 02/28/2023 12:58 PM    LABGLOM >60 02/28/2023 12:58 PM    GLUCOSE 108 02/28/2023 12:58 PM    PROT 7.7 02/28/2023 12:58 PM    LABALBU 4.0 02/28/2023 12:58 PM    CALCIUM 9.3 02/28/2023 12:58 PM    BILITOT <0.2 02/28/2023 12:58 PM    ALKPHOS 65 02/28/2023 12:58 PM    AST 18 02/28/2023 12:58 PM    ALT 23 02/28/2023 12:58 PM       ASSESSMENT AND PLAN:      Carline Blue is a 52 y.o. female with Body mass index is 36.05 kg/m². ,  patient is deemed appropriate candidate for weight loss surgery by our multidisciplinary team.         Following The Metabolic and Bariatric Surgery Accreditation and Quality Improvement Program [facility]), and [employer] of [employer] (ACS) recommendations, the Bariatric Surgical Risk/Benefit Calculator was used for [employer]. Report was discussed with [place] Barrett and patient wishes to proceed with surgery, fully understanding the risks and benefits. Of note, The [facility] Bariatric Surgical Risk/Benefit Calculator estimates the chance of an unfavorable outcome (such as a complication or death), the chance of remission of weight-related comorbidities, and the patient's BMI, weight change, and percent total weight change after surgery. These quantities are estimated based upon information the patient gives the healthcare provider about prior health history. The estimates are calculated using data from a large number of patients who had a primary bariatric surgical procedure similar to the one the patient may have. Please note the risk percentages, remission percentages, BMI, weight change, and percent total weight change provided to you by the risk/benefit calculator are only estimates. These estimates only take certain information into account. There may be other factors that are not included in the estimate which may increase or decrease the risk of a complication, the chance of remission of a weight-related comorbidity, or the amount of weight the patient loses. These estimates are not a guarantee of results. A complication after surgery may happen even if the risk is low, a weight-related comorbidity may not go into remission even if the chances are high, and a patient may lose more or less weight than predicted. This information is not intended to replace the advice of a doctor or healthcare provider about the diagnosis, treatment, or potential outcomes.  The Provider is not responsible for medical decisions that may be made by the patient based on the risk/benefit calculator estimates, since these estimates are provided for informational purposes. Patients should always consult their doctor or other health care provider before deciding on a treatment plan. Both open and laparoscopic approach were explained in details. Benefits and risks explained. Informed consent obtained. Risks including but not limited to; Infection, bleeding, gastric leak or obstruction, persistent nausea, vomiting, or reflux, injury to surrounding structures, risks of anesthesia, stricture, delayed gastric emptying, staple line leak, incisional hernia, malnutrition , hair loss, and/ or Conversion to Open surgery may be necessary. Failure to lose or maintain weight loss, Gallstones or Kidney Stones, Deep Venous Thrombosis , pulmonary embolism and / or death. With obesity and/or Fatty liver , I may elect to perform liver core biopsy to have  Baseline idea on liver pathology if there is abnormal Liver Functions or if there is hepatomegaly &/or lesion, risks include but not limited to bile leak, liver hematoma or failure to diagnose a condition. Emeli Baxter understands that there may be a need to perform other urgent or necessary procedures that were unanticipated. Tonny Barry consent to the performance of any additional procedures determined during the original procedure to be in their best interests and where delay might cause additional harm or put patient at risk for additional anesthesia risk for required by a second procedure and that will be determined by the surgeon. I did explain thoroughly to the patient that compliance with pre- and post op diet and other recommendations are integral part to improve the chances of successful weight loss and also not following it could end with serious health complications. We discussed that our goal is to ameliorate the medical problems and not to obtain a specific body mass index.  Patient understands the risks and benefits and wishes to proceed with the procedure. Also understands if BMI is lower than 40 without significant co morbid conditions, concerns for risks of surgery being somewhat higher over long run, however patient wants to proceed and fully understands the risks. Clearly BMI over 35 does impose very serious health risks as well chances of losing weight on diet only is very limited and sustaining weight loss is even less, thus surgery is certainly recommended for long term weight loss and better health overall given compliance. I advised the patient that we can't guarantee final insurance approval.      The patient was counseled at length about the risks of zeferino Covid-19 during their perioperative period and any recovery window from their procedure. The patient was made aware that zeferino Covid-19  may worsen their prognosis for recovering from their procedure  and lend to a higher morbidity and/or mortality risk. All material risks, benefits, and reasonable alternatives including postponing the procedure were discussed. The patient does wish to proceed with the procedure at this time. Patient Active Problem List    Diagnosis Date Noted    Metabolic syndrome 23/15/2392     Priority: Medium    Mixed hyperlipidemia 04/07/2022    Morbid obesity with BMI of 40.0-44.9, adult ([facility] 03/01/2022    Chronic GERD 03/01/2022    Essential hypertension 03/01/2022    Prediabetes 03/01/2022    Ventral hernia without obstruction or gangrene 03/01/2022    Severe obesity (BMI 35.0-39. 9) with comorbidity ([address]     Lumbar spondylosis 08/30/2021    Lumbar pain with radiation down both legs 08/30/2021         1. Plan for Laparoscopic Sleeve Gastrectomy with General Anesthesia.           Electronically signed by Timbo Chaidez MD , FACS, FASMBS on 3/13/2023 at 8:12 AM

## 2023-03-13 NOTE — OP NOTE
Operative Note      Patient: Ibrahima Barry  YOB: 1970  MRN: [id number]    Date of Procedure: 3/13/2023  [facility]) Physicians   [facility]  [address], [address] [address] . Phone: [phone]  Fax: [phone]             Procedure Note    Indications: The patient was evaluated by our multidisciplinary team and was found to be a good candidate for weight loss surgery for future weight loss. Body mass index is 36.05 kg/m². Stephanie Crowella Risks and benefits explained and Ibrahima Barry wants to proceed. Pre-operative Diagnosis:   Patient Active Problem List    Diagnosis Date Noted    Metabolic syndrome 53/64/0504     Priority: Medium    Mixed hyperlipidemia 04/07/2022    Morbid obesity with BMI of 40.0-44.9, adult ([facility] [address] 03/01/2022    Chronic GERD 03/01/2022    Essential hypertension 03/01/2022    Prediabetes 03/01/2022    Ventral hernia without obstruction or gangrene 03/01/2022    Severe obesity (BMI 35.0-39. 9) with comorbidity (HCC)     Lumbar spondylosis 08/30/2021    Lumbar pain with radiation down both legs 08/30/2021         Post-operative Diagnosis:   Same      Procedure:    - Laparoscopic Sleeve Gastrectomy. - Laparoscopic 1ry Ventral hernia Repair. Surgeon: Andree Hodgkin, MD, FACS. Anesthesia: General endotracheal anesthesia        Procedure Details   The patient was seen again in the Holding Room. The risks, benefits, complications, treatment options, and expected outcomes were discussed with the patient and/or family. Including but not limited to; The possibilities of reaction to medication, pulmonary aspiration, perforation of viscus, bleeding, recurrent infection, strictures, leaks, failure to lose weight, regaining weight,  malnutrition, the need for additional procedures, failure to diagnose a condition, and creating a complication requiring transfusion or operation were discussed.  There was concurrence with the proposed plan and informed consent was obtained. The site of surgery was properly noted/marked. The patient was taken to Operating Room, identified as Monica Correa and the procedure verified as Laparoscopic Sleeve Gastrectomy & other indicated procedures. A Time Out was held and the above information confirmed. The patient was placed in the supine position and general anesthesia was induced, along with placement of orogastric tube, Venodyne boots. Lovenox SQ, Emend and IV Antibiotics given pre-operatively. All pressure points were padded properly. Patient prepped and draped in sterile fashion. A left upper quadrant incision was made and a veres needle was inserted after confirming with saline drop test.  After adequate pneumoperitoneum was obtained, a 5 mm trocar was inserted. This was followed by a endoscope which confirmed intra-abdominal placement and there was no injury on initial trocar placement. Additional ports were placed in the standard positions under direct vision. The abdomen was initially explored and noted known ventral hernia. A liver retractor was inserted through a small incision in the upper midline, lifted the liver upward, and was then secured to the OR table. The pylorus was identified and measurement was taken approximately 4-6 cm from the pylorus along the greater curvature of the stomach. The harmonic scalpel / ligasure was used to take down the attachments and short gastric vessels along the greater curve of the stomach. This was continued until all attachments were taken down and continued to the gastro-esophageal junction. A 34 Togolese dilator was placed along the lesser curvature and into the pylorus. A stapler was fired along the dilator to create an appropriate sized gastric sleeve pouch. Purple loads followed by Stoll loads were used to create the sleeve. The staple line looked very healthy and no bleeding from staple line.   The stomach was confirmed to be completely divided with uniform shape,  no twist in the sleeve and wide patent incisura. A 2-0 vicryl suture was used to over-sew and imbricate the sleeve staple line. The staple line was completely over-sewn. The dilator was removed and an Edlich tube was advanced across the sleeve to ensure patency mainly at incisura, then retracted to just above the GE junction. The stomach distal to the staple line was clamped and methylene blue saline was injected under pressure confirming No obstruction (flow noted to duodenum) and No leak. Then we turned our attention to the patient's ventral hernia, hernia was 3.3 cms, it was felt necessary to repair to prevent any future bowel incarceration or enlargement. Made sure that Hernia edges were free. Local anesthetic used to infiltrate around the hernia site. Through separate incisions, stab incisions were made using 11 blade, then using terrell mendez needle, with laparoscopic guidance and using the laparoscopic dakota grasper to retreive sutures, a total of three 0.0 Ethibond stitches were passed under direct visualization and used to close the defect. Pneumoperitoneum removed and stitches tied , then repair was inspected after re-applying pneumoperitoneum and it was intact. The abdomen was carefully inspected and there was no bleeding or any other abnormality. The stomach was brought out through the RUQ incision. Hemostasis was confirmed. Snow applied along suture line and/or biopsy sites to ensure hemostasis. The 15 and 12 port sites was closed using 0.0 Vicryl  suture at the level of the fascia and that was done under direct vision with the laparoscope to ensure proper closure and nothing entrapped. Local Anesthetic used at port sites. The trocar site skin wounds were closed using 4.0 Vicryl after copious Irrigation of the wounds. Dermabond / or steri strips applied. Instrument, sponge, and needle counts were correct at the conclusion of the case. Findings:  As above. Estimated Blood Loss:  Minimal           Drains: none           Total IV Fluids: 1500 ml           Specimens: Stomach (Subtotal)            Complications:  None; patient tolerated the procedure well. Disposition: PACU - hemodynamically stable. Condition: stable    Attending Attestation: I was present and scrubbed for the entire procedure.       Electronically signed by Spring Morton MD , FACS, FASMBS  on 3/13/2023 at 10:00 AM          Electronically signed by Spring Morton MD on 3/13/2023 at 9:59 AM

## 2023-03-13 NOTE — PROGRESS NOTES
Patient to PACU from OR. Awakens to voice. VSS. Abd soft, lap sites intact. [facility]  called to bedside.

## 2023-03-14 ENCOUNTER — APPOINTMENT (OUTPATIENT)
Dept: GENERAL RADIOLOGY | Age: 53
DRG: 403 | End: 2023-03-14
Attending: SURGERY
Payer: COMMERCIAL

## 2023-03-14 VITALS
HEART RATE: 81 BPM | OXYGEN SATURATION: 95 % | WEIGHT: 210 LBS | HEIGHT: 64 IN | RESPIRATION RATE: 18 BRPM | BODY MASS INDEX: 35.85 KG/M2 | SYSTOLIC BLOOD PRESSURE: 135 MMHG | TEMPERATURE: 98 F | DIASTOLIC BLOOD PRESSURE: 79 MMHG

## 2023-03-14 LAB
ANION GAP SERPL CALCULATED.3IONS-SCNC: 10 MMOL/L (ref 3–16)
BUN BLDV-MCNC: 8 MG/DL (ref 7–20)
CALCIUM SERPL-MCNC: 9.5 MG/DL (ref 8.3–10.6)
CHLORIDE BLD-SCNC: 105 MMOL/L (ref 99–110)
CO2: 26 MMOL/L (ref 21–32)
CREAT SERPL-MCNC: 0.6 MG/DL (ref 0.6–1.1)
GFR SERPL CREATININE-BSD FRML MDRD: >60 ML/MIN/{1.73_M2}
GLUCOSE BLD-MCNC: 108 MG/DL (ref 70–99)
HCT VFR BLD CALC: 35 % (ref 36–48)
HEMOGLOBIN: 11.3 G/DL (ref 12–16)
MCH RBC QN AUTO: 27.9 PG (ref 26–34)
MCHC RBC AUTO-ENTMCNC: 32.3 G/DL (ref 31–36)
MCV RBC AUTO: 86.3 FL (ref 80–100)
PDW BLD-RTO: 14.1 % (ref 12.4–15.4)
PLATELET # BLD: 365 K/UL (ref 135–450)
PMV BLD AUTO: 8.3 FL (ref 5–10.5)
POTASSIUM SERPL-SCNC: 4.2 MMOL/L (ref 3.5–5.1)
RBC # BLD: 4.05 M/UL (ref 4–5.2)
SODIUM BLD-SCNC: 141 MMOL/L (ref 136–145)
WBC # BLD: 7.7 K/UL (ref 4–11)

## 2023-03-14 PROCEDURE — 6370000000 HC RX 637 (ALT 250 FOR IP): Performed by: NURSE PRACTITIONER

## 2023-03-14 PROCEDURE — A4216 STERILE WATER/SALINE, 10 ML: HCPCS | Performed by: SURGERY

## 2023-03-14 PROCEDURE — 74240 X-RAY XM UPR GI TRC 1CNTRST: CPT

## 2023-03-14 PROCEDURE — 85027 COMPLETE CBC AUTOMATED: CPT

## 2023-03-14 PROCEDURE — 2580000003 HC RX 258: Performed by: SURGERY

## 2023-03-14 PROCEDURE — 6370000000 HC RX 637 (ALT 250 FOR IP): Performed by: SURGERY

## 2023-03-14 PROCEDURE — 99024 POSTOP FOLLOW-UP VISIT: CPT | Performed by: NURSE PRACTITIONER

## 2023-03-14 PROCEDURE — 36415 COLL VENOUS BLD VENIPUNCTURE: CPT

## 2023-03-14 PROCEDURE — 6360000004 HC RX CONTRAST MEDICATION: Performed by: SURGERY

## 2023-03-14 PROCEDURE — 80048 BASIC METABOLIC PNL TOTAL CA: CPT

## 2023-03-14 PROCEDURE — 6360000002 HC RX W HCPCS: Performed by: SURGERY

## 2023-03-14 PROCEDURE — 6360000002 HC RX W HCPCS: Performed by: NURSE PRACTITIONER

## 2023-03-14 PROCEDURE — C9113 INJ PANTOPRAZOLE SODIUM, VIA: HCPCS | Performed by: SURGERY

## 2023-03-14 RX ORDER — PROMETHAZINE HYDROCHLORIDE 6.25 MG/5ML
12.5 SYRUP ORAL EVERY 6 HOURS PRN
Status: DISCONTINUED | OUTPATIENT
Start: 2023-03-14 | End: 2023-03-14 | Stop reason: HOSPADM

## 2023-03-14 RX ORDER — OXYCODONE HYDROCHLORIDE AND ACETAMINOPHEN 5; 325 MG/1; MG/1
2 TABLET ORAL EVERY 4 HOURS PRN
Status: DISCONTINUED | OUTPATIENT
Start: 2023-03-14 | End: 2023-03-14 | Stop reason: HOSPADM

## 2023-03-14 RX ORDER — NIFEDIPINE 30 MG/1
30 TABLET, EXTENDED RELEASE ORAL DAILY
Status: DISCONTINUED | OUTPATIENT
Start: 2023-03-14 | End: 2023-03-14 | Stop reason: HOSPADM

## 2023-03-14 RX ORDER — OXYCODONE HYDROCHLORIDE AND ACETAMINOPHEN 5; 325 MG/1; MG/1
1 TABLET ORAL EVERY 4 HOURS PRN
Status: DISCONTINUED | OUTPATIENT
Start: 2023-03-14 | End: 2023-03-14 | Stop reason: HOSPADM

## 2023-03-14 RX ORDER — KETOROLAC TROMETHAMINE 30 MG/ML
15 INJECTION, SOLUTION INTRAMUSCULAR; INTRAVENOUS EVERY 6 HOURS
Status: COMPLETED | OUTPATIENT
Start: 2023-03-14 | End: 2023-03-14

## 2023-03-14 RX ORDER — ONDANSETRON 8 MG/1
8 TABLET, ORALLY DISINTEGRATING ORAL EVERY 8 HOURS PRN
Qty: 30 TABLET | Refills: 2 | Status: SHIPPED | OUTPATIENT
Start: 2023-03-14

## 2023-03-14 RX ORDER — ONDANSETRON 8 MG/1
8 TABLET, ORALLY DISINTEGRATING ORAL EVERY 8 HOURS PRN
Status: DISCONTINUED | OUTPATIENT
Start: 2023-03-14 | End: 2023-03-14 | Stop reason: HOSPADM

## 2023-03-14 RX ORDER — ONDANSETRON 8 MG/1
8 TABLET, ORALLY DISINTEGRATING ORAL EVERY 8 HOURS PRN
Qty: 30 TABLET | Refills: 0 | Status: SHIPPED | OUTPATIENT
Start: 2023-03-14

## 2023-03-14 RX ORDER — OXYCODONE HYDROCHLORIDE AND ACETAMINOPHEN 5; 325 MG/1; MG/1
1 TABLET ORAL EVERY 6 HOURS PRN
Qty: 28 TABLET | Refills: 0 | Status: SHIPPED | OUTPATIENT
Start: 2023-03-14 | End: 2023-03-21

## 2023-03-14 RX ADMIN — ENOXAPARIN SODIUM 30 MG: 100 INJECTION SUBCUTANEOUS at 10:08

## 2023-03-14 RX ADMIN — IOHEXOL 50 ML: 350 INJECTION, SOLUTION INTRAVENOUS at 09:00

## 2023-03-14 RX ADMIN — DIPHENHYDRAMINE HYDROCHLORIDE 12.5 MG: 50 INJECTION, SOLUTION INTRAMUSCULAR; INTRAVENOUS at 06:10

## 2023-03-14 RX ADMIN — NIFEDIPINE 30 MG: 30 TABLET, EXTENDED RELEASE ORAL at 11:48

## 2023-03-14 RX ADMIN — SODIUM CHLORIDE, PRESERVATIVE FREE 10 ML: 5 INJECTION INTRAVENOUS at 10:09

## 2023-03-14 RX ADMIN — SODIUM CHLORIDE, PRESERVATIVE FREE 40 MG: 5 INJECTION INTRAVENOUS at 10:08

## 2023-03-14 RX ADMIN — KETOROLAC TROMETHAMINE 15 MG: 30 INJECTION, SOLUTION INTRAMUSCULAR; INTRAVENOUS at 11:47

## 2023-03-14 ASSESSMENT — PAIN SCALES - WONG BAKER
WONGBAKER_NUMERICALRESPONSE: 0
WONGBAKER_NUMERICALRESPONSE: 0

## 2023-03-14 ASSESSMENT — PAIN DESCRIPTION - ONSET: ONSET: ON-GOING

## 2023-03-14 ASSESSMENT — PAIN DESCRIPTION - DESCRIPTORS: DESCRIPTORS: ACHING;DISCOMFORT

## 2023-03-14 ASSESSMENT — PAIN - FUNCTIONAL ASSESSMENT: PAIN_FUNCTIONAL_ASSESSMENT: ACTIVITIES ARE NOT PREVENTED

## 2023-03-14 ASSESSMENT — PAIN SCALES - GENERAL
PAINLEVEL_OUTOF10: 0
PAINLEVEL_OUTOF10: 0
PAINLEVEL_OUTOF10: 5

## 2023-03-14 ASSESSMENT — PAIN DESCRIPTION - FREQUENCY: FREQUENCY: CONTINUOUS

## 2023-03-14 ASSESSMENT — PAIN DESCRIPTION - LOCATION: LOCATION: ABDOMEN

## 2023-03-14 ASSESSMENT — PAIN DESCRIPTION - ORIENTATION: ORIENTATION: LEFT;RIGHT

## 2023-03-14 ASSESSMENT — PAIN DESCRIPTION - PAIN TYPE: TYPE: SURGICAL PAIN

## 2023-03-14 NOTE — PROGRESS NOTES
CLINICAL PHARMACY NOTE: MEDS TO BEDS    Total # of Prescriptions Filled: 2   The following medications were delivered to the patient:  Zofran 8 mg  Endocet 5-325 mg    Additional Documentation:  Delivered to patient daughter-signed  Ok to be delivered per Sage Nielson CPhT

## 2023-03-14 NOTE — DISCHARGE SUMMARY
The patient was admitted on day of surgery and underwent a laparoscopic sleeve gastrectomy & 1ry ventral hernia repair. Surgery went well and the patient went to our post-op bariatric floor. Overnight, the patient had stable vitals signs, good urine output and ambulated in the halls. On POD #1 the patient underwent an UGI which revealed no leak or obstruction. Phase I diet was started and the patient tolerated well. The patient has no N/V, tolerating diet, ambulating and pain controlled on oral medication. The patient was discharged home today in stable condition. The patient will f/u in 2 weeks and was given dietary and ambulation instruction. The patient was instructed to call if there are any questions or concerns. Patient Active Problem List    Diagnosis Date Noted    Metabolic syndrome 93/71/3521     Priority: Medium    S/P laparoscopic sleeve gastrectomy 03/13/2023     Priority: Medium    Mixed hyperlipidemia 04/07/2022    Morbid obesity with BMI of 40.0-44.9, adult ([facility] [address] 03/01/2022    Chronic GERD 03/01/2022    Essential hypertension 03/01/2022    Prediabetes 03/01/2022    Ventral hernia without obstruction or gangrene 03/01/2022    Severe obesity (BMI 35.0-39. 9) with comorbidity ([address]     Lumbar spondylosis 08/30/2021    Lumbar pain with radiation down both legs 08/30/2021

## 2023-03-14 NOTE — PLAN OF CARE
Problem: Chronic Conditions and Co-morbidities  Goal: Patient's chronic conditions and co-morbidity symptoms are monitored and maintained or improved  3/13/2023 2042 by Carmen Dominguez RN  Outcome: Progressing     Problem: Discharge Planning  Goal: Discharge to home or other facility with appropriate resources  3/13/2023 2042 by Carmen Dominguez RN  Outcome: Progressing     Problem: ABCDS Injury Assessment  Goal: Absence of physical injury  3/13/2023 2042 by Carmen Dominguez RN  Outcome: Progressing     Problem: Pain  Goal: Verbalizes/displays adequate comfort level or baseline comfort level  3/13/2023 2042 by Carmen Dominguez RN  Outcome: Progressing     Problem: Safety - Adult  Goal: Free from fall injury  3/13/2023 2042 by Carmen Dominguez RN  Outcome: Progressing     Problem: Safety - Adult  Goal: Free from fall injury  3/13/2023 2042 by Carmen Dominguez RN  Outcome: Progressing

## 2023-03-14 NOTE — PROGRESS NOTES
[facility]) Physicians   General & Laparoscopic Surgery  [facility]       Pt seen and examined    S/p laparoscopic sleeve gastrectomy & 1ry ventral hernia repair. The patient is ambulating in [place] santos. Pain is well controlled. There is no nausea and/or vomiting. Patient was having some itching overnight and was given PRN IV Benadryl. There are no other complaints or questions. Vitals:    03/14/23 0030 03/14/23 0308 03/14/23 0430 03/14/23 0811   BP: (!) 161/95  (!) 169/84 (!) 146/81   Pulse: 93 84 94 80   Resp: 13 14 20 18   Temp: 98.5 °F (36.9 °C)  98.3 °F (36.8 °C) 97.9 °F (36.6 °C)   TempSrc: Oral  Oral Oral   SpO2: 96% 98% 96% 97%   Weight:       Height:         Abdomen is soft, appropriately tender, incisions stable with no erythema. Breath sounds are clear bilaterally. Bowel sounds are hypoactive in all quadrants.     Data  CBC:   Lab Results   Component Value Date/Time    WBC 7.7 03/14/2023 05:21 AM    RBC 4.05 03/14/2023 05:21 AM    HGB 11.3 03/14/2023 05:21 AM    HCT 35.0 03/14/2023 05:21 AM    MCV 86.3 03/14/2023 05:21 AM    MCH 27.9 03/14/2023 05:21 AM    MCHC 32.3 03/14/2023 05:21 AM    RDW 14.1 03/14/2023 05:21 AM     03/14/2023 05:21 AM    MPV 8.3 03/14/2023 05:21 AM     BMP:    Lab Results   Component Value Date/Time     03/14/2023 05:21 AM    K 4.2 03/14/2023 05:21 AM    K 4.2 12/25/2022 02:59 PM     03/14/2023 05:21 AM    CO2 26 03/14/2023 05:21 AM    BUN 8 03/14/2023 05:21 AM    LABALBU 4.0 02/28/2023 12:58 PM    CREATININE 0.6 03/14/2023 05:21 AM    CALCIUM 9.5 03/14/2023 05:21 AM    GFRAA >60 03/08/2022 08:46 AM    LABGLOM >60 03/14/2023 05:21 AM    GLUCOSE 108 03/14/2023 05:21 AM     Current Inpatient Medications    Current Facility-Administered Medications: albuterol sulfate HFA (PROVENTIL;VENTOLIN;PROAIR) 108 (90 Base) MCG/ACT inhaler 2 puff, 2 puff, Inhalation, 4x Daily PRN  diphenhydrAMINE (BENADRYL) injection 12.5 mg, 12.5 mg, IntraVENous, Q6H PRN  hydrALAZINE (APRESOLINE) injection 10 mg, 10 mg, IntraVENous, Q2H PRN  hyoscyamine (LEVSIN) 500 MCG/ML injection 250 mcg, 250 mcg, IntraVENous, Q4H PRN  labetalol (NORMODYNE;TRANDATE) injection 10 mg, 10 mg, IntraVENous, Q2H PRN  lidocaine 4 % external patch 1 patch, 1 patch, TransDERmal, Daily  pantoprazole (PROTONIX) 40 mg in sodium chloride (PF) 0.9 % 10 mL injection, 40 mg, IntraVENous, Daily  promethazine (PHENERGAN) injection 6.25 mg, 6.25 mg, IntraMUSCular, Q6H PRN  HYDROmorphone (DILAUDID) 1 mg/mL PCA, , IntraVENous, Continuous  naloxone 0.4 mg in 10 mL sodium chloride syringe, , IntraVENous, PRN  lactated ringers IV soln infusion, , IntraVENous, Continuous  sodium chloride flush 0.9 % injection 5-40 mL, 5-40 mL, IntraVENous, 2 times per day  sodium chloride flush 0.9 % injection 5-40 mL, 5-40 mL, IntraVENous, PRN  0.9 % sodium chloride infusion, , IntraVENous, PRN  HYDROmorphone HCl PF (DILAUDID) injection 0.5 mg, 0.5 mg, IntraVENous, Q3H PRN  ondansetron (ZOFRAN) injection 4 mg, 4 mg, IntraVENous, Q6H PRN  [START ON 3/16/2023] scopolamine (TRANSDERM-SCOP) transdermal patch 1 patch, 1 patch, TransDERmal, Q72H  metoclopramide (REGLAN) injection 10 mg, 10 mg, IntraVENous, Q6H PRN  enoxaparin Sodium (LOVENOX) injection 30 mg, 30 mg, SubCUTAneous, 2 times per day  Facility-Administered Medications Ordered in Other Encounters: 0.9 % sodium chloride infusion, , IntraVENous, Continuous    Patient Active Problem List    Diagnosis Date Noted    Metabolic syndrome 18/16/1086     Priority: Medium    S/P laparoscopic sleeve gastrectomy 03/13/2023     Priority: Medium    Mixed hyperlipidemia 04/07/2022    Morbid obesity with BMI of 40.0-44.9, [facility] 03/01/2022    Chronic GERD 03/01/2022    Essential hypertension 03/01/2022    Prediabetes 03/01/2022    Ventral hernia without obstruction or gangrene 03/01/2022    Severe obesity (BMI 35.0-39. 9) with comorbidity ([address]     Lumbar spondylosis 08/30/2021    Lumbar pain with radiation down both legs 08/30/2021       A/P  Tonny Barry is a 52 y.o. female pod#1, s/p laparoscopic sleeve gastrectomy & 1ry ventral hernia repair. Stable overall. UGI with no leak/obstruction, will start on Phase I diet. Patient has voided postoperatively and urine output is WNL. Will continue to monitor. Will discontinue PCA and start oral pain medications. Will order one dose of IV Toradol. Will restart patient's home dose of Adalat CC. Patient will continue to wear abdominal binder when walking for support. Patient needs to ambulate in the [place] s[place] every 60-90 minutes. Patient needs to utilize incentive spirometer 10 times per hour while awake. Patient will continue icing incisions. Plan for discharge today if nausea remains controlled, patient continues to void and is tolerating Phase I diet. Discussed with Dr. La Kim and Nursing Staff.     LINDA Vu

## 2023-03-14 NOTE — PROGRESS NOTES
2041: Assessment complete, VSS, BS absent, pt denies passing flatus, abd binder in place, ice pck applied to upper abd, assisted pt to the BR, pt able to void 500ml green urine, pt ambulated in the hallway 1 large lap tolerated well, pt c/o itching, will give benadryl, see MAR, discussed care plan, pt mutually agrees, call light and belongings in reach, daughter at the bedside, will continue monitoring. 6611: pt c/o itching again, gave benadryl, see MAR, rubbed pt back with dry wash cloth.   Phoenix Rdz RN

## 2023-03-14 NOTE — DISCHARGE INSTRUCTIONS
Unless otherwise noted you will crush all your medications for the first 2 weeks post op and mix them with clears for the first 2 days and then with your pureed food for the remainder of the 2 weeks. If you do not tolerate your medications crushed you may quarter or cut in half and take one piece at a time. You may take your omeprazole (Prilosec), linaclotide (Linzess) and nifedipine (Adalat CC) whole as they cannot be opened or crushed. After two weeks, you may start taking all your pills whole. Please make sure when taking whole pills, you do not take them all at once. Take them one at a time and allow a minute or so between each one. Medications    As discussed during your pre-surgical visits there may need to be changes made to your home medications following surgery. A benefit of weight loss and healthy dietary habits can be an improvement in your blood pressure, blood sugars and edema (swelling). Blood Pressure    Following surgery monitor your blood pressure to evaluate for low blood pressure. A normal blood pressure is generally considered less than 120/80. If your blood pressure is lower than normal and/or youre experiencing symptoms of hypotension such as fatigue, blurred vision, lightheadedness or dizziness please contact your PCP for them to evaluate the need to lower or discontinue your blood pressure medication(s). Activity  You are encouraged to walk through the entire postoperative period as this will help with preventing clots, pneumonia and constipation. You are restricted from lifting anything greater than 10 lbs for the first 6 weeks after surgery. You may shower starting on postoperative day #2 (second day after surgery), but should not get into baths, pools and/or hot tubs until the provider releases you to do so. Driving  You should not drive for at least the first week after your surgery and/or if you are still taking pain medication.  Most patients generally drive to their 2-week postoperative appointment. Constipation  Constipation can occur following surgery. This is due to anesthesia, decreased fluids and fiber in your diet, drinking protein shakes and taking pain medication. Make sure you are getting 48-64 ounces of fluids per day and walking. If you develop constipation you may use docusate sodium (Colace) or Dulcolax which are over-the-counter stool softeners and can be taken 1-2 times per day. If the stool softeners do not help in 2-3 days you can use Miralax which is an over-the-counter laxative and can be taken daily. If this does not help please call the office for further instructions. Pain Management  Following surgery, you will have some pain related to your incisions and from the gas instilled into your abdomen during surgery. It is important for you to walk frequently after surgery to help dissipate the gas pain and to keep your incisions from getting stiff. To help with incisional pain we will provide a prescription for pain medication, but also recommend that you ice your incisions for the days and weeks after surgery. When icing your incisions follow the recommendation of 15 minutes on/15 minutes off rotating to different areas. This will help with the swelling and inflammation related to your incisional pain. In addition, keep wearing your abdominal binder when you are up and about to help with support. Vitamins (Fusion)  When starting the Fusion multivitamin take one per day. Calcium chews do not start until after 6 week post-op visit. Phase I Diet  Please reference the Preoperative Class diet instructions and the Bariatric Surgery Dietary Discharge Instructions handout reviewed in the hospital. Pito Ponce will be on a clear liquid diet for postop day one and two. Your goal is to get in 48-64oz of fluids daily. Refer to the Clear Liquids List for a list of acceptable clear liquids.     Phase II Diet  Please reference the Preoperative Class diet instructions, the Bariatric Surgery Dietary Discharge Instructions handout and the Ideal Meal Process handout reviewed in the hospital. You will start your full liquid and pureed/blenderized foods on postop day three and will continue through postop day twenty. Your priority is to still get in 48-64oz of fluids daily, but you can start back on two of your protein shakes and begin eating approved full liquid & pureed/blenderized food. Your goal is 60-80 grams of protein daily. When drinking your protein shakes you will not be able to drink them as fast as you could before surgery. However, we do not want you to drink your shakes for longer than 40 minutes, so that you are able to get back to your fluids. Food Diary  Please make sure you are keeping a food diary of everything you eat and drink starting on postop day three and bring it with you to your postop visits. Incentive Spirometer  You will continue to use your incentive spirometer (breathing device given to you in hospital) for the first two weeks after surgery to help prevent pneumonia and to open your lungs completely. You will use it ten times per hour while awake. Dermabond Topical Skin Adhesive Care    Dermabond is a clear, sterile liquid skin adhesive that holds wound/incision edges together. The adhesive will usually remain in place for 5 to 10 days, then naturally wear or slough off your skin. Do not scratch, rub, or pick at the Skin Affix adhesive film. Do not apply liquid or ointment medications, soap, powders or lotions to the incision while the Dermabond is in place. You may shower 48 hours after your surgery and briefly wet the Dermabond. Do not sit in a tub of water or swim. Do not soak or scrub your incision. After showering, gently blot your incision dry. No tape or any type of bandage/covering is required over the Dermabond.     PLEASE CONTACT YOUR PRIMARY CARE PHYSICIAN AT DISCHARGE AND COME UP WITH A PLAN FOR THE MANAGEMENT OF YOUR BLOOD PRESSURE MEDICATIONS. IF YOU DO NOT HAVE A WAY TO CHECK YOUR BLOOD PRESSURE AT HOME I SUGGEST THAT YOU  A BLOOD PRESSURE CUFF AT THE STORE TO MONITOR YOUR BLOOD PRESSURE SO THAT YOUR PRIMARY CARE PHYSICIAN CAN EVALUATE THE NEED TO CHANGE ANY OF YOUR MEDICATIONS.

## 2023-03-20 ENCOUNTER — TELEPHONE (OUTPATIENT)
Dept: BARIATRICS/WEIGHT MGMT | Age: 53
End: 2023-03-20

## 2023-03-20 NOTE — TELEPHONE ENCOUNTER
Looks like patient is doing well with intake overall. Agree with recommendations. Next follow up at 2 week post-op visit.   Thank you,  LINDA Bravo
Constipation         [x] Pt has had a BM since surgery              [x] Pt does not feel constipated  [] Tried Colace  [] Tried Miralax          [] Multivitamin capsule started  - not yet will start today    All questions and concerns addressed including none. Recommended resuming other half of second protein shake, or may use protein water. Recommend tracking protein intake from protein drinks and pureed foods in food log to make sure patient is close to 60g protein goal  Recommended limiting portions to 2oz, and limit eating time to no more than 30 minutes. Pt and daughter had no further questions at this time. Patient was asked to please fill out hospital survey if she receives one in the mail.

## 2023-03-23 ENCOUNTER — TELEPHONE (OUTPATIENT)
Dept: BARIATRICS/WEIGHT MGMT | Age: 53
End: 2023-03-23

## 2023-03-23 DIAGNOSIS — Z98.84 S/P LAPAROSCOPIC SLEEVE GASTRECTOMY: Primary | ICD-10-CM

## 2023-03-23 DIAGNOSIS — G89.18 POST-OPERATIVE PAIN: ICD-10-CM

## 2023-03-23 RX ORDER — CELECOXIB 200 MG/1
200 CAPSULE ORAL DAILY
Qty: 7 CAPSULE | Refills: 0 | Status: SHIPPED | OUTPATIENT
Start: 2023-03-23 | End: 2023-03-30

## 2023-03-23 NOTE — TELEPHONE ENCOUNTER
Agree with taking Tylenol, Ice and Binder. Please let patient know that I have sent a prescription for Celebrex to her pharmacy. She will take this daily for one week. This is will help with inflammation and swelling. She can also purchase [employer] or [employer] lidocaine patches as well. She is seeing Dr. Zenaida Perez on 3/28/2023 for her two week post-op appointment.   Thank you,  AYESHA DollC

## 2023-03-23 NOTE — TELEPHONE ENCOUNTER
Pt daughter calling in. Pt s/p sleeve 3/13/23. Stating that pt is out of pain medication and still having some pain, and asked for a refill. Did let them know that some pain is normal.     Did let daughter know that, unable to refill pain medications, pt is able to do tylenol every 6hrs, ice area, wear her binder, make sure shes getting up an walking and drinking. Did let them know that I would put in a message for the provider for any more suggestions.     Please advise

## 2023-03-28 ENCOUNTER — OFFICE VISIT (OUTPATIENT)
Dept: BARIATRICS/WEIGHT MGMT | Age: 53
End: 2023-03-28

## 2023-03-28 VITALS
HEART RATE: 85 BPM | OXYGEN SATURATION: 96 % | BODY MASS INDEX: 34.66 KG/M2 | WEIGHT: 203 LBS | DIASTOLIC BLOOD PRESSURE: 70 MMHG | HEIGHT: 64 IN | SYSTOLIC BLOOD PRESSURE: 122 MMHG | RESPIRATION RATE: 18 BRPM

## 2023-03-28 DIAGNOSIS — Z98.84 S/P LAPAROSCOPIC SLEEVE GASTRECTOMY: Primary | ICD-10-CM

## 2023-03-28 PROCEDURE — 99024 POSTOP FOLLOW-UP VISIT: CPT | Performed by: SURGERY

## 2023-03-28 NOTE — PROGRESS NOTES
Dietary Assessment Note      Vitals:   Vitals:    23 1209   BP: 122/70   Pulse: 85   Resp: 18   SpO2: 96%   Weight: 203 lb (92.1 kg)   Height: 5' 4\" (1.626 m)    Patient lost 20.8 lbs over 1 month. Total Weight Loss: 36 lbs    Labs reviewed: labs are reviewed, up to date and normal    Protein intake: <60 grams/day     Fluid intake: 48-64 oz/day water, decaf tea    Multivitamin/mineral intake:  Fusion MVI capsule w/ iron 1/day    Calcium intake: no    Other: no, not yet taking     Exercise:  walking    Nutrition Assessment: 2 weeks post-op visit. 1-2 Protein shakes made w/ 1% milk  Eating pureed foods 1 times/day: yogurt, mashed potatoes    Amount able to eat per sittin oz    Following 30/30/30 rule: Eating over 30-40 minutes. Waits 30-60 minutes between eating and drinking.     Food Intolerances/issues:  shakes feel overly full/nauseous      Client Concerns: none    Goals:   - Track 60 grams protein and 48 oz water each day  - Advance to phase 3 diet at 3weeks post op    Plan: Follow up at 6 weeks post op and as needed    Sebas Baker RD, LD
compliance with  post op diet and other recommendations are integral part to improve the chances of successful weight loss and also not following it could end with serious health complications. I advised Nicholas Lorenzo to gradually advance activity and  to call if there are any questions or concerns. Nicholas Lorenzo will follow up in 4 weeks. Please note that some or all of this report was generated using voice recognition software. Please notify me in case of any questions about the content of this document, as some errors in transcription may have occurred .       Electronically signed by Veronica Jin MD , FACS, FASMBS  on 3/28/2023 at 12:34 PM

## 2023-03-28 NOTE — PATIENT INSTRUCTIONS
Patient received dietary handouts and education.     Goals:   - Track 60 grams protein and 48 oz water each day  - Advance to phase 3 diet at 3weeks post op

## 2023-04-25 ENCOUNTER — OFFICE VISIT (OUTPATIENT)
Dept: BARIATRICS/WEIGHT MGMT | Age: 53
End: 2023-04-25

## 2023-04-25 VITALS
RESPIRATION RATE: 18 BRPM | SYSTOLIC BLOOD PRESSURE: 128 MMHG | OXYGEN SATURATION: 98 % | HEART RATE: 92 BPM | BODY MASS INDEX: 33.29 KG/M2 | WEIGHT: 195 LBS | HEIGHT: 64 IN | DIASTOLIC BLOOD PRESSURE: 79 MMHG

## 2023-04-25 DIAGNOSIS — Z98.84 S/P LAPAROSCOPIC SLEEVE GASTRECTOMY: Primary | ICD-10-CM

## 2023-04-25 PROCEDURE — 99024 POSTOP FOLLOW-UP VISIT: CPT | Performed by: SURGERY

## 2023-04-25 NOTE — PROGRESS NOTES
Dietary Assessment Note      Vitals:   Vitals:    23 1406   Resp: 18   Height: 5' 4\" (1.626 m)    Patient lost 8 lbs over past ~ month. Total Weight Loss: 44 lbs    Labs reviewed: No new nutrition related labs      Protein intake: 60-80 grams/day     Fluid intake: 48-64 oz/day    Multivitamin/mineral intake: Fusion MVI capsule with Fe     Calcium intake: not yet     Other: none     Exercise: Walking     Nutrition Assessment: 6 week s/p sleeve post-op visit. Assessment completed via [employer]. Breakfast: mashed potato with ground beef OR crackers OR eggs OR yogurt     Snack: Unjury shake made with 8 oz 1% milk     Lunch:  nothing     Snack: Unjury shake made with 8 oz 1% milk     Dinner: mashed potato with ground beef OR crackers OR eggs OR yogurt     Snack: nothing     Fluids: Drinking water     Consuming only soft/mushy foods? Yes     Amount able to eat per sittin oz     Following  rule: Sometimes eating too fast, does not eat and drink at the same time     Food Intolerances/issues: None     Client Concerns: none     Goals:    Include protein with all meals/snacks - discussed she needs to continue to consume at least 60-80 g/pro/day     Plan: F/U at 4 months     Betty Kirk RD, WILY

## 2023-04-26 NOTE — PROGRESS NOTES
[facility]) Physicians   [employer] [facility] [employer]  Gaylin Scheuermann, MD, [facility], [address] [facility], [address] [place] [id number] . Phone: [phone]  Fax: [phone]       The patient is a 52 y.o. female who returns today for follow up. Ventura Waters is s/p     Laparoscopic sleeve gastrectomy    We discussed how her weight affects her overall health including:  Patient Active Problem List   Diagnosis    Lumbar spondylosis    Lumbar pain with radiation down both legs    Severe obesity (BMI 35.0-39. 9) with comorbidity ([address]    Morbid obesity with BMI of 40.0-44.9, adult ([facility] [address]    Chronic GERD    Essential hypertension    Prediabetes    Ventral hernia without obstruction or gangrene    Mixed hyperlipidemia    Metabolic syndrome    S/P laparoscopic sleeve gastrectomy        Vitals:    04/25/23 1406   BP: 128/79   Pulse: 92   Resp: 18   SpO2: 98%   Weight: 195 lb (88.5 kg)   Height: 5' 4\" (1.626 m)       Lab Results   Component Value Date/Time    WBC 7.7 03/14/2023 05:21 AM    RBC 4.05 03/14/2023 05:21 AM    HGB 11.3 03/14/2023 05:21 AM    HCT 35.0 03/14/2023 05:21 AM    MCV 86.3 03/14/2023 05:21 AM    MCH 27.9 03/14/2023 05:21 AM    MCHC 32.3 03/14/2023 05:21 AM    MPV 8.3 03/14/2023 05:21 AM    NEUTOPHILPCT 73.5 12/25/2022 02:59 PM    LYMPHOPCT 18.3 12/25/2022 02:59 PM    MONOPCT 7.3 12/25/2022 02:59 PM    EOSRELPCT 0.5 12/25/2022 02:59 PM    BASOPCT 0.4 12/25/2022 02:59 PM    NEUTROABS 6.2 12/25/2022 02:59 PM    LYMPHSABS 1.5 12/25/2022 02:59 PM    MONOSABS 0.6 12/25/2022 02:59 PM    EOSABS 0.0 12/25/2022 02:59 PM     Lab Results   Component Value Date/Time     03/14/2023 05:21 AM    K 4.2 03/14/2023 05:21 AM    K 4.2 12/25/2022 02:59 PM     03/14/2023 05:21 AM    CO2 26 03/14/2023 05:21 AM    ANIONGAP 10 03/14/2023 05:21 AM    GLUCOSE 108 03/14/2023 05:21 AM    BUN 8 03/14/2023 05:21 AM    CREATININE 0.6 03/14/2023 05:21 AM    LABGLOM >60 03/14/2023 05:21 AM    GFRAA >60 03/08/2022

## 2023-04-28 ENCOUNTER — OFFICE VISIT (OUTPATIENT)
Dept: ORTHOPEDIC SURGERY | Age: 53
End: 2023-04-28

## 2023-04-28 VITALS — WEIGHT: 198 LBS | HEIGHT: 64 IN | BODY MASS INDEX: 33.8 KG/M2

## 2023-04-28 DIAGNOSIS — M17.11 PRIMARY OSTEOARTHRITIS OF RIGHT KNEE: Primary | ICD-10-CM

## 2023-04-28 RX ORDER — TRIAMCINOLONE ACETONIDE 40 MG/ML
80 INJECTION, SUSPENSION INTRA-ARTICULAR; INTRAMUSCULAR ONCE
Status: COMPLETED | OUTPATIENT
Start: 2023-04-28 | End: 2023-04-28

## 2023-04-28 RX ORDER — BUPIVACAINE HYDROCHLORIDE 2.5 MG/ML
3 INJECTION, SOLUTION INFILTRATION; PERINEURAL ONCE
Status: COMPLETED | OUTPATIENT
Start: 2023-04-28 | End: 2023-04-28

## 2023-04-28 RX ADMIN — BUPIVACAINE HYDROCHLORIDE 7.5 MG: 2.5 INJECTION, SOLUTION INFILTRATION; PERINEURAL at 11:05

## 2023-04-28 RX ADMIN — TRIAMCINOLONE ACETONIDE 80 MG: 40 INJECTION, SUSPENSION INTRA-ARTICULAR; INTRAMUSCULAR at 11:05

## 2023-04-28 NOTE — PROGRESS NOTES
Adonay Kaur  [id number]  April 28, 2023    Chief Complaint   Patient presents with    Follow-up     Right knee       History: The patient is a 41-year-old female who is here for evaluation of her right knee. She did undergo the gastric sleeve procedure on 3/13. She has lost a significant amount of weight. She continues to have rather severe right knee pain. We have been treating both knees over the past few years and typically she responds well to injections. Her last right knee injection was several months ago. She does have difficulty with stairs. She has difficulty squatting. She does have pain at nighttime. The patient's  past medical history, medications, allergies,  family history, social history, and have been reviewed, and dated and are recorded in the chart. Pertinent items are noted in HPI. Review of systems reviewed from Pertinent History Form dated on 6/23/2021 and available in the patient's chart under the Media tab. Vitals:  Ht 5' 4\" (1.626 m)   Wt 198 lb (89.8 kg)   LMP 06/10/2020   BMI 33.99 kg/m²     Physical: Ms. Martinez appears well, she is in no apparent distress, she demonstrates appropriate mood & affect. She is alert and oriented to person, place and time. Examination of the bilateral lower extremity reveals no pain with range of motion of the hip. She has generalized tenderness to palpation about the joint line of the right knee . Range of motion is from 0 degrees to 110 degrees bilaterally. Strength is 4+ to 5/5 for all muscle groups about the bilateral knee. There is patellofemoral crepitus with range of motion of the bilateral knee. Varus and valgus stressing of the knees reveals no evidence of instability. There is no clear evidence of  effusion in the knees. Anterior drawer and Lachman are negative bilaterally. Examination of the skin reveals no rashes, ulceration, or lesion, bilaterally in the lower extremities.  Sensation to both lower extremities is

## 2023-06-20 ENCOUNTER — OFFICE VISIT (OUTPATIENT)
Dept: BARIATRICS/WEIGHT MGMT | Age: 53
End: 2023-06-20
Payer: COMMERCIAL

## 2023-06-20 VITALS
OXYGEN SATURATION: 99 % | RESPIRATION RATE: 18 BRPM | SYSTOLIC BLOOD PRESSURE: 130 MMHG | DIASTOLIC BLOOD PRESSURE: 72 MMHG | WEIGHT: 193 LBS | BODY MASS INDEX: 32.95 KG/M2 | HEIGHT: 64 IN | HEART RATE: 96 BPM

## 2023-06-20 DIAGNOSIS — Z98.84 S/P LAPAROSCOPIC SLEEVE GASTRECTOMY: Primary | ICD-10-CM

## 2023-06-20 DIAGNOSIS — E66.01 SEVERE OBESITY (BMI 35.0-39.9) WITH COMORBIDITY (HCC): ICD-10-CM

## 2023-06-20 DIAGNOSIS — K21.9 CHRONIC GERD: ICD-10-CM

## 2023-06-20 DIAGNOSIS — I10 ESSENTIAL HYPERTENSION: ICD-10-CM

## 2023-06-20 PROCEDURE — G8417 CALC BMI ABV UP PARAM F/U: HCPCS | Performed by: SURGERY

## 2023-06-20 PROCEDURE — G8427 DOCREV CUR MEDS BY ELIG CLIN: HCPCS | Performed by: SURGERY

## 2023-06-20 PROCEDURE — 3078F DIAST BP <80 MM HG: CPT | Performed by: SURGERY

## 2023-06-20 PROCEDURE — 3075F SYST BP GE 130 - 139MM HG: CPT | Performed by: SURGERY

## 2023-06-20 PROCEDURE — 1036F TOBACCO NON-USER: CPT | Performed by: SURGERY

## 2023-06-20 PROCEDURE — 99214 OFFICE O/P EST MOD 30 MIN: CPT | Performed by: SURGERY

## 2023-06-20 PROCEDURE — 3017F COLORECTAL CA SCREEN DOC REV: CPT | Performed by: SURGERY

## 2023-06-20 NOTE — PROGRESS NOTES
Dietary Assessment Note      Vitals:   Vitals:    23 1134   BP: 130/72   Pulse: 96   Resp: 18   SpO2: 99%   Weight: 193 lb (87.5 kg)   Height: 5' 4\" (1.626 m)    Patient lost 2 lbs over past ~2 months. Total Weight Loss: 46 lbs    Labs reviewed: No new nutrition related labs      Protein intake: 60-80 grams/day     Fluid intake: 48-64 oz/day    Multivitamin/mineral intake: Fusion MVI capsule with Fe     Calcium intake: Calcium chews 1x/day    Other: none     Exercise: Started HP 3 weeks ago, going 3-4x/week     Nutrition Assessment: 14 week s/p sleeve post-op visit. Daughter present at visit.     Breakfast: 2 eggs   Snack: Unjury shake made with 8 oz 1% milk   Lunch:  nothing   Snack: Unjury shake made with 8 oz 1% milk   Dinner: fish or chix or beef w salad (cucumbers/ onions/lettuce/ HB egg)  Snack: nothing     Fluids: Drinking water     Amount able to eat per sittin-6 oz     Following  rule: Sometimes eating too fast, does not eat and drink at the same time     Food Intolerances/issues: Sometimes meat- lamb      Client Concerns: none     Goals:   Continue current nutrition treatment plan  Take MVI and take calcium chews as directed (2x/day at separate times)    Plan: F/U per provider     Mehrdad Markham RD

## 2023-06-20 NOTE — PATIENT INSTRUCTIONS
Patient received dietary handouts and education.     Goals:   Continue current nutrition treatment plan  Take MVI and take calcium chews as directed (2x/day at separate times)

## 2023-06-20 NOTE — PROGRESS NOTES
[facility]) Physicians   [employer]  Jyoti Ayala MD, [facility] [address], [address] [facility], [address] [facility]    [place]  [address] . Phone: [phone]  Fax: [phone]            Chief Complaint   Patient presents with    Bariatrics Post Op Follow Up     14 wk s/p sleeve 3/13/23           HPI:    Quinn Hdez is a very pleasant 52 y.o.  female , Body mass index is 33.13 kg/m². Trivedi Correa And multiple medical problems who is presenting for bariatric follow up care. Ariella Dallas is s/p laparoscopic sleeve gastrectomy by me 3/2023. Initial Weight: 239 lbs, Weight Loss: 46 lbs. Comes today to the clinic without any complaints. Patient denies any nausea, vomiting, fevers, chills, shortness of breath, chest pain, constipation or urinary symptoms. Denies any heartburn nor dysphagia. Patient informed us today that they are taking the multivitamins as instructed. Patient denies any tingling, weakness,  numbness nor any neurological symptoms. Ariella Dallas is feeling very well, and is very active. Patient is very pleased with the weight loss and resolution of co-morbid conditions. Pain Assessment   Denies any abdominal pain     Past Medical History:   Diagnosis Date    Arthritis     Asthma     Diabetes mellitus ([address]     patient denies 1/2023    Hypertension     Obesity (BMI 30-39. 9)      Past Surgical History:   Procedure Laterality Date    SLEEVE GASTRECTOMY N/A 3/13/2023    LAPAROSCOPIC SLEEVE GASTRECTOMY performed by Marivel Vargsa MD at [address] [place] [address] N/A 6/24/2022    EGD BIOPSY performed by Marivel Vargas MD at [place] [address] N/A 3/13/2023    LAPAROSCOPIC VENTRAL HERNIA REPAIR performed by Marivel Vargas MD at [address] [place] [address]     History reviewed. No pertinent family history.   Social History     Tobacco Use    Smoking status: Never    Smokeless tobacco: Never   Substance Use Topics    Alcohol use: No     I counseled the patient on the importance of

## 2023-07-10 ENCOUNTER — TELEPHONE (OUTPATIENT)
Dept: BARIATRICS/WEIGHT MGMT | Age: 53
End: 2023-07-10

## 2023-07-10 NOTE — TELEPHONE ENCOUNTER
Pt daughter calling in for her. Stating that pt is having hard time drinking water, stated for the past few weeks when pt has plain water, pt vomits. Did ask if pt is eating well, she said yes. Eating fine, just having issues with the water, asked if she was drinking to fast, to cold of water, pt denied.  Following 30-30-30    Please advise,  May contact daughter  [phone]

## 2023-07-10 NOTE — TELEPHONE ENCOUNTER
Agree with dietary recommendations. As Dr. Boom Gutierrez indicated if the patient is tolerating eating then there is no obstruction. Patient may be drinking to quickly or needs to try fluids without the packet. Definitely needs to avoid anything fried.   Thank you,  AYESHA YiC

## 2023-07-10 NOTE — TELEPHONE ENCOUNTER
Spoke with pt's daughter Ramon Osborn. Pt s/p sleeve 3/13/23. Pt's daughter states that pt was been vomiting up plain water for the past 2 weeks. Pt following 30-30-30 rule and not drinking too quickly. Pt's daughter states that pt currently drinks 67 oz/day of water mixed with plexus (powered supplement packet with added electrolytes- (10 calories per packet to mix into water) with no issues. Pt not having any trouble with tolerating foods. Pt's daughter reports recall as the following:    B-eggs with turkey sausage  L-fruit with yogurt and string   S-string cheese  D-fried fish or lamb with rice and salad. Advised pt's daughter to limit high fat content of foods. Reviewed to not shetty fish-but try baking fish and watch fat content of meats. Pt's daughter verbalized understanding and was advised to call with any additional questions or concerns. Told pt's daughter I would relay information to provider.

## 2023-07-28 ENCOUNTER — OFFICE VISIT (OUTPATIENT)
Dept: ORTHOPEDIC SURGERY | Age: 53
End: 2023-07-28

## 2023-07-28 VITALS — BODY MASS INDEX: 31.76 KG/M2 | WEIGHT: 186 LBS | HEIGHT: 64 IN

## 2023-07-28 DIAGNOSIS — M17.12 PRIMARY OSTEOARTHRITIS OF LEFT KNEE: ICD-10-CM

## 2023-07-28 DIAGNOSIS — M17.11 PRIMARY OSTEOARTHRITIS OF RIGHT KNEE: Primary | ICD-10-CM

## 2023-07-28 RX ORDER — TRIAMCINOLONE ACETONIDE 40 MG/ML
80 INJECTION, SUSPENSION INTRA-ARTICULAR; INTRAMUSCULAR ONCE
Status: COMPLETED | OUTPATIENT
Start: 2023-07-28 | End: 2023-07-28

## 2023-07-28 RX ORDER — CYCLOBENZAPRINE HCL 10 MG
10 TABLET ORAL NIGHTLY PRN
Qty: 10 TABLET | Refills: 0 | Status: SHIPPED | OUTPATIENT
Start: 2023-07-28 | End: 2023-08-07

## 2023-07-28 RX ORDER — MELOXICAM 15 MG/1
15 TABLET ORAL DAILY PRN
Qty: 30 TABLET | Refills: 0 | Status: SHIPPED | OUTPATIENT
Start: 2023-07-28

## 2023-07-28 RX ORDER — BUPIVACAINE HYDROCHLORIDE 2.5 MG/ML
3 INJECTION, SOLUTION INFILTRATION; PERINEURAL ONCE
Status: COMPLETED | OUTPATIENT
Start: 2023-07-28 | End: 2023-07-28

## 2023-07-28 RX ADMIN — TRIAMCINOLONE ACETONIDE 80 MG: 40 INJECTION, SUSPENSION INTRA-ARTICULAR; INTRAMUSCULAR at 10:11

## 2023-07-28 RX ADMIN — BUPIVACAINE HYDROCHLORIDE 7.5 MG: 2.5 INJECTION, SOLUTION INFILTRATION; PERINEURAL at 10:10

## 2023-07-28 NOTE — PROGRESS NOTES
effusion in the knees. Anterior drawer and Lachman are negative bilaterally. Examination of the skin reveals no rashes, ulceration, or lesion, bilaterally in the lower extremities. Sensation to both lower extremities is grossly intact. Exam of both feet reveals pedal pulses intact and brisk cap refill. Patient is able to dorsiflex and wiggle all toes. Deep tendon reflexes of the lower extremities are normal and symmetric. She has diffuse tenderness to palpation over the anterior aspect of the right knee. X-rays: Views of both knees obtained in the office today were extensively reviewed. The x-rays confirm severe osteoarthritis bilaterally. The changes are most severe medially bilaterally. She also has significant patellofemoral arthritis which is most significant in the left knee. Impression: Bilateral knee osteoarthritis     Plan:  The patient does not want to pursue surgical intervention at this time. She will continue to work on weight loss. She was given a prescription for meloxicam and Flexeril. The patient was explained the risks, benefits and alternatives to injection. The patient understood the risks and benefits and agreed to proceed. At this time, the bilateral knees were injected under sterile conditions. After a Betadine prep of the joints, 3 cc of 0.25% Marcaine and 2 cc of 40 mg Kenalog were injected into the knees. The patient tolerated the injections rather well. The patient was instructed to follow up for any signs of infection. Natural history and expected course discussed. Questions answered. Reduction in offending activity. OTC analgesics as needed. The patient will follow up with me in 3 months. Patient has continued pain, she will call the office and we will consider Visco supplement injections.

## 2023-08-07 ENCOUNTER — HOSPITAL ENCOUNTER (EMERGENCY)
Age: 53
Discharge: HOME | End: 2023-08-07

## 2023-08-07 ENCOUNTER — APPOINTMENT (OUTPATIENT)
Dept: GENERAL RADIOLOGY | Age: 53
End: 2023-08-07

## 2023-08-07 VITALS
HEIGHT: 65 IN | DIASTOLIC BLOOD PRESSURE: 83 MMHG | HEART RATE: 94 BPM | BODY MASS INDEX: 30.89 KG/M2 | TEMPERATURE: 98.1 F | OXYGEN SATURATION: 100 % | RESPIRATION RATE: 22 BRPM | SYSTOLIC BLOOD PRESSURE: 143 MMHG | WEIGHT: 185.41 LBS

## 2023-08-07 DIAGNOSIS — R51.9 FRONTAL HEADACHE: ICD-10-CM

## 2023-08-07 DIAGNOSIS — J45.901 EXACERBATION OF ASTHMA, UNSPECIFIED ASTHMA SEVERITY, UNSPECIFIED WHETHER PERSISTENT: Primary | ICD-10-CM

## 2023-08-07 PROCEDURE — 71046 X-RAY EXAM CHEST 2 VIEWS: CPT

## 2023-08-07 PROCEDURE — 99283 EMERGENCY DEPT VISIT LOW MDM: CPT

## 2023-08-07 PROCEDURE — 6370000000 HC RX 637 (ALT 250 FOR IP): Performed by: PHYSICIAN ASSISTANT

## 2023-08-07 PROCEDURE — 6370000000 HC RX 637 (ALT 250 FOR IP)

## 2023-08-07 RX ORDER — IPRATROPIUM BROMIDE AND ALBUTEROL SULFATE 2.5; .5 MG/3ML; MG/3ML
SOLUTION RESPIRATORY (INHALATION)
Status: COMPLETED
Start: 2023-08-07 | End: 2023-08-07

## 2023-08-07 RX ORDER — ACETAMINOPHEN 500 MG
1000 TABLET ORAL ONCE
Status: COMPLETED | OUTPATIENT
Start: 2023-08-07 | End: 2023-08-07

## 2023-08-07 RX ORDER — ALBUTEROL SULFATE 90 UG/1
2 AEROSOL, METERED RESPIRATORY (INHALATION) 4 TIMES DAILY PRN
Qty: 18 G | Refills: 0 | Status: SHIPPED | OUTPATIENT
Start: 2023-08-07

## 2023-08-07 RX ORDER — ALBUTEROL SULFATE 2.5 MG/3ML
2.5 SOLUTION RESPIRATORY (INHALATION) ONCE
Status: DISCONTINUED | OUTPATIENT
Start: 2023-08-07 | End: 2023-08-07

## 2023-08-07 RX ORDER — PREDNISONE 10 MG/1
30 TABLET ORAL
Qty: 15 TABLET | Refills: 0 | Status: SHIPPED | OUTPATIENT
Start: 2023-08-07 | End: 2023-08-12

## 2023-08-07 RX ADMIN — IPRATROPIUM BROMIDE AND ALBUTEROL SULFATE 3 ML: .5; 3 SOLUTION RESPIRATORY (INHALATION) at 17:53

## 2023-08-07 RX ADMIN — ACETAMINOPHEN 1000 MG: 500 TABLET ORAL at 18:35

## 2023-08-07 ASSESSMENT — PAIN DESCRIPTION - DESCRIPTORS
DESCRIPTORS: THROBBING
DESCRIPTORS: ACHING

## 2023-08-07 ASSESSMENT — PAIN SCALES - GENERAL
PAINLEVEL_OUTOF10: 8
PAINLEVEL_OUTOF10: 10

## 2023-08-07 ASSESSMENT — PAIN - FUNCTIONAL ASSESSMENT: PAIN_FUNCTIONAL_ASSESSMENT: 0-10

## 2023-08-07 ASSESSMENT — LIFESTYLE VARIABLES
HOW MANY STANDARD DRINKS CONTAINING ALCOHOL DO YOU HAVE ON A TYPICAL DAY: PATIENT DOES NOT DRINK
HOW OFTEN DO YOU HAVE A DRINK CONTAINING ALCOHOL: NEVER

## 2023-08-07 ASSESSMENT — PAIN DESCRIPTION - LOCATION
LOCATION: HEAD;CHEST
LOCATION: HEAD

## 2023-08-07 NOTE — ED NOTES
Pt states that she still has a slight frontal headache, she reports that she is breathing easier and does not feel off balance like she did when she was breathing hard.     Daryl WINSTON at bedside      Arabella Graff RN  08/07/23 0187

## 2023-08-07 NOTE — ED PROVIDER NOTES
Status: She is alert and oriented to person, place, and time. GCS: GCS eye subscore is 4. GCS verbal subscore is 5. GCS motor subscore is 6. Cranial Nerves: No cranial nerve deficit. Sensory: No sensory deficit. Motor: No weakness or pronator drift. Coordination: Coordination is intact. Coordination normal.      Gait: Gait normal.      Comments: NIH stroke scale 0   Psychiatric:         Mood and Affect: Mood normal.         Behavior: Behavior normal.         DIAGNOSTIC RESULTS   LABS:    Labs Reviewed - No data to display    When ordered only abnormal lab results are displayed. All other labs were within normal range or not returned as of this dictation. EKG: When ordered, EKG's are interpreted by the Emergency Department Physician in the absence of a cardiologist.  Please see their note for interpretation of EKG. RADIOLOGY:   Non-plain film images such as CT, Ultrasound and MRI are read by the radiologist. Plain radiographic images are visualized and preliminarily interpreted by the ED Provider with the below findings:    Two view chest x-ray negative for acute infiltrate or effusion as interpreted myself in absence of radiologist.    Interpretation per the Radiologist below, if available at the time of this note:    XR CHEST (2 VW)   Final Result   No acute abnormality. XR CHEST (2 VW)    Result Date: 8/7/2023  EXAMINATION: TWO XRAY VIEWS OF THE CHEST 8/7/2023 6:02 pm COMPARISON: 02/09/2023 HISTORY: ORDERING SYSTEM PROVIDED HISTORY: sob TECHNOLOGIST PROVIDED HISTORY: Reason for exam:->sob Reason for Exam: sob and tightness in chest since last night FINDINGS: No lung infiltrate or consolidation. No pneumothorax or pleural effusion. Heart size is normal.     No acute abnormality. No results found.     PROCEDURES   Unless otherwise noted below, none     Procedures    CRITICAL CARE TIME (.cctime)   none    PAST MEDICAL HISTORY      has a past medical history of Arthritis,

## 2023-08-07 NOTE — DISCHARGE INSTRUCTIONS
Home in stable condition use medications as written, stay well-hydrated, and monitor for gradual improvement. Follow-up outpatient with your family doctor for further care and treatment. Return to the ER for any emergency worsening or concern.

## 2023-08-29 ENCOUNTER — OFFICE VISIT (OUTPATIENT)
Dept: BARIATRICS/WEIGHT MGMT | Age: 53
End: 2023-08-29
Payer: COMMERCIAL

## 2023-08-29 VITALS
SYSTOLIC BLOOD PRESSURE: 130 MMHG | BODY MASS INDEX: 30.9 KG/M2 | RESPIRATION RATE: 18 BRPM | HEART RATE: 112 BPM | OXYGEN SATURATION: 98 % | HEIGHT: 64 IN | DIASTOLIC BLOOD PRESSURE: 76 MMHG | WEIGHT: 181 LBS

## 2023-08-29 DIAGNOSIS — R73.03 PREDIABETES: ICD-10-CM

## 2023-08-29 DIAGNOSIS — Z98.84 S/P LAPAROSCOPIC SLEEVE GASTRECTOMY: Primary | ICD-10-CM

## 2023-08-29 DIAGNOSIS — I10 ESSENTIAL HYPERTENSION: ICD-10-CM

## 2023-08-29 PROCEDURE — 99214 OFFICE O/P EST MOD 30 MIN: CPT | Performed by: SURGERY

## 2023-08-29 PROCEDURE — 1036F TOBACCO NON-USER: CPT | Performed by: SURGERY

## 2023-08-29 PROCEDURE — 3078F DIAST BP <80 MM HG: CPT | Performed by: SURGERY

## 2023-08-29 PROCEDURE — G8417 CALC BMI ABV UP PARAM F/U: HCPCS | Performed by: SURGERY

## 2023-08-29 PROCEDURE — 3017F COLORECTAL CA SCREEN DOC REV: CPT | Performed by: SURGERY

## 2023-08-29 PROCEDURE — 3075F SYST BP GE 130 - 139MM HG: CPT | Performed by: SURGERY

## 2023-08-29 PROCEDURE — G8427 DOCREV CUR MEDS BY ELIG CLIN: HCPCS | Performed by: SURGERY

## 2023-08-29 NOTE — PROGRESS NOTES
Dietary Assessment Note  Vitals:   Vitals:    08/29/23 1124   BP: 130/76   Pulse: (!) 112   Resp: 18   SpO2: 98%   Weight: 181 lb (82.1 kg)   Height: 5' 4\" (1.626 m)   Patient lost 12 lbs over past ~2 months. Total Weight Loss: 58 lbs    Labs reviewed: labs are reviewed, up to date and normal, no lab studies available for review at time of visit    Protein intake: 60-80 grams/day     Fluid intake: >64 oz/day- water    Multivitamin/mineral intake: yes - 1 fusion capsule w/ iron    Calcium intake: yes - 2 calcium soft chews    Other: none    Exercise: yes - gym velma / boxing & works with a  - exercises 3-4x/wk     Nutrition Assessment: 6 month post-op visit.      B- egg w/ odell & sometimes apple  L- chicken OR fish OR lamb & vegetable  S- yogurt sometime w/ fruit or vegetable  D- little protein fruit & vegetable    Amount able to eat per sitting: ~1/2 cup volume    Following 30/30/30 rule: yes    Food Intolerances/issues: none    Client Concerns: non    Goals:   - Continue plan  - Continue exercise    Plan: f/u in 2 months OR as directed    Darrick Snider, RD, LD
negative  Allergy and Immunology ROS: negative  Hematological and Lymphatic ROS: negative  Endocrine ROS: negative  Breast ROS: negative  Respiratory ROS: negative  Cardiovascular ROS: negative  Gastrointestinal ROS:negative  Genito-Urinary ROS: negative  Musculoskeletal ROS: negative   Skin ROS: negative    Physical Exam     Constitutional: Patient is oriented to person, place, and time. Patient appears well-developed and well-nourished. Patient is active and cooperative. Non-toxic appearance. No distress. HENT:   Head: Normocephalic and atraumatic. Head is without abrasion and without laceration. Hair is normal.   Right Ear: External ear normal. No lacerations. No drainage, swelling . Left Ear: External ear normal. No lacerations. No drainage, swelling. Nose/Mouth: no abrasions nor lesions. Eyes: Conjunctivae, EOM and lids are normal. Right eye exhibits no discharge. No foreign body present in the right eye. Left eye exhibits no discharge. No foreign body present in the left eye. No scleral icterus. Neck:No JVD present. Pulmonary/Chest: Effort normal. No accessory muscle usage or stridor. No apnea. No respiratory distress. Cardiovascular: Normal rate and no JVD. Abdominal: Normal appearance. Patient exhibits no distension. Musculoskeletal: Normal range of motion. Patient exhibits no edema. Neurological: Patient is alert and oriented to person, place, and time. Skin: Skin is warm and dry. No abrasion and no rash noted. Patient is not diaphoretic. No cyanosis or erythema. Psychiatric: Patient has a normal mood and affect. Speech is normal and behavior is normal.       A/P:    Cindi Harley was seen today for bariatrics post op follow up. Diagnoses and all orders for this visit:    S/P laparoscopic sleeve gastrectomy  -     CBC with Auto Differential; Future  -     Comprehensive Metabolic Panel; Future  -     Hemoglobin A1C; Future  -     Iron and TIBC; Future  -     Lipid Panel;  Future  -

## 2023-11-14 ENCOUNTER — OFFICE VISIT (OUTPATIENT)
Dept: BARIATRICS/WEIGHT MGMT | Age: 53
End: 2023-11-14
Payer: COMMERCIAL

## 2023-11-14 VITALS
BODY MASS INDEX: 29.19 KG/M2 | RESPIRATION RATE: 18 BRPM | HEART RATE: 100 BPM | SYSTOLIC BLOOD PRESSURE: 135 MMHG | WEIGHT: 171 LBS | OXYGEN SATURATION: 99 % | HEIGHT: 64 IN | DIASTOLIC BLOOD PRESSURE: 86 MMHG

## 2023-11-14 DIAGNOSIS — K21.9 CHRONIC GERD: ICD-10-CM

## 2023-11-14 DIAGNOSIS — I10 ESSENTIAL HYPERTENSION: ICD-10-CM

## 2023-11-14 DIAGNOSIS — Z98.84 S/P LAPAROSCOPIC SLEEVE GASTRECTOMY: Primary | ICD-10-CM

## 2023-11-14 PROCEDURE — G8484 FLU IMMUNIZE NO ADMIN: HCPCS | Performed by: SURGERY

## 2023-11-14 PROCEDURE — 3075F SYST BP GE 130 - 139MM HG: CPT | Performed by: SURGERY

## 2023-11-14 PROCEDURE — 3017F COLORECTAL CA SCREEN DOC REV: CPT | Performed by: SURGERY

## 2023-11-14 PROCEDURE — 3079F DIAST BP 80-89 MM HG: CPT | Performed by: SURGERY

## 2023-11-14 PROCEDURE — G8427 DOCREV CUR MEDS BY ELIG CLIN: HCPCS | Performed by: SURGERY

## 2023-11-14 PROCEDURE — 99214 OFFICE O/P EST MOD 30 MIN: CPT | Performed by: SURGERY

## 2023-11-14 PROCEDURE — 1036F TOBACCO NON-USER: CPT | Performed by: SURGERY

## 2023-11-14 PROCEDURE — G8417 CALC BMI ABV UP PARAM F/U: HCPCS | Performed by: SURGERY

## 2023-11-17 ENCOUNTER — OFFICE VISIT (OUTPATIENT)
Dept: ORTHOPEDIC SURGERY | Age: 53
End: 2023-11-17

## 2023-11-17 VITALS — BODY MASS INDEX: 28.85 KG/M2 | WEIGHT: 169 LBS | HEIGHT: 64 IN

## 2023-11-17 DIAGNOSIS — M17.11 PRIMARY OSTEOARTHRITIS OF RIGHT KNEE: ICD-10-CM

## 2023-11-17 DIAGNOSIS — M17.12 PRIMARY OSTEOARTHRITIS OF LEFT KNEE: Primary | ICD-10-CM

## 2023-11-17 RX ORDER — BUPIVACAINE HYDROCHLORIDE 2.5 MG/ML
3 INJECTION, SOLUTION INFILTRATION; PERINEURAL ONCE
Status: COMPLETED | OUTPATIENT
Start: 2023-11-17 | End: 2023-11-17

## 2023-11-17 RX ORDER — CYCLOBENZAPRINE HCL 10 MG
10 TABLET ORAL NIGHTLY
Qty: 30 TABLET | Refills: 0 | Status: SHIPPED | OUTPATIENT
Start: 2023-11-17 | End: 2023-12-17

## 2023-11-17 RX ORDER — TRIAMCINOLONE ACETONIDE 40 MG/ML
80 INJECTION, SUSPENSION INTRA-ARTICULAR; INTRAMUSCULAR ONCE
Status: COMPLETED | OUTPATIENT
Start: 2023-11-17 | End: 2023-11-17

## 2023-11-17 RX ADMIN — TRIAMCINOLONE ACETONIDE 80 MG: 40 INJECTION, SUSPENSION INTRA-ARTICULAR; INTRAMUSCULAR at 11:44

## 2023-11-17 RX ADMIN — TRIAMCINOLONE ACETONIDE 80 MG: 40 INJECTION, SUSPENSION INTRA-ARTICULAR; INTRAMUSCULAR at 11:43

## 2023-11-17 RX ADMIN — BUPIVACAINE HYDROCHLORIDE 7.5 MG: 2.5 INJECTION, SOLUTION INFILTRATION; PERINEURAL at 11:43

## 2023-11-17 NOTE — PROGRESS NOTES
extremities are normal and symmetric. She has diffuse tenderness to palpation over the anterior aspect of the right knee. X-rays: Views of both knees obtained in the office today were extensively reviewed. The x-rays confirm severe osteoarthritis bilaterally. The changes are most severe medially bilaterally. She also has significant patellofemoral arthritis which is most significant in the left knee. Impression: Bilateral knee osteoarthritis     Plan:  The patient does not want to pursue surgical intervention at this time. She will continue to work on weight loss. She was given a prescription for Flexeril. The patient was explained the risks, benefits and alternatives to injection. The patient understood the risks and benefits and agreed to proceed. At this time, the bilateral knees were injected under sterile conditions. After a Betadine prep of the joints, 3 cc of 0.25% Marcaine and 2 cc of 40 mg Kenalog were injected into the knees. The patient tolerated the injections rather well. The patient was instructed to follow up for any signs of infection. Natural history and expected course discussed. Questions answered. Reduction in offending activity. OTC analgesics as needed. The patient will follow up with me in 3 months. The patient is aware that she will ultimately require total knee arthroplasty.

## 2023-11-20 DIAGNOSIS — I10 ESSENTIAL HYPERTENSION: ICD-10-CM

## 2023-11-20 DIAGNOSIS — R73.03 PREDIABETES: ICD-10-CM

## 2023-11-20 DIAGNOSIS — Z98.84 S/P LAPAROSCOPIC SLEEVE GASTRECTOMY: ICD-10-CM

## 2023-11-20 LAB
25(OH)D3 SERPL-MCNC: 44.8 NG/ML
ALBUMIN SERPL-MCNC: 4 G/DL (ref 3.4–5)
ALBUMIN/GLOB SERPL: 1.5 {RATIO} (ref 1.1–2.2)
ALP SERPL-CCNC: 66 U/L (ref 40–129)
ALT SERPL-CCNC: 41 U/L (ref 10–40)
ANION GAP SERPL CALCULATED.3IONS-SCNC: 10 MMOL/L (ref 3–16)
AST SERPL-CCNC: 21 U/L (ref 15–37)
BASOPHILS # BLD: 0 K/UL (ref 0–0.2)
BASOPHILS NFR BLD: 0.2 %
BILIRUB SERPL-MCNC: 0.3 MG/DL (ref 0–1)
BUN SERPL-MCNC: 21 MG/DL (ref 7–20)
CALCIUM SERPL-MCNC: 9.5 MG/DL (ref 8.3–10.6)
CHLORIDE SERPL-SCNC: 107 MMOL/L (ref 99–110)
CHOLEST SERPL-MCNC: 243 MG/DL (ref 0–199)
CO2 SERPL-SCNC: 26 MMOL/L (ref 21–32)
CREAT SERPL-MCNC: 0.7 MG/DL (ref 0.6–1.1)
DEPRECATED RDW RBC AUTO: 15.6 % (ref 12.4–15.4)
EOSINOPHIL # BLD: 0 K/UL (ref 0–0.6)
EOSINOPHIL NFR BLD: 0 %
FOLATE SERPL-MCNC: >20 NG/ML (ref 4.78–24.2)
GFR SERPLBLD CREATININE-BSD FMLA CKD-EPI: >60 ML/MIN/{1.73_M2}
GLUCOSE SERPL-MCNC: 97 MG/DL (ref 70–99)
HCT VFR BLD AUTO: 34.8 % (ref 36–48)
HDLC SERPL-MCNC: 89 MG/DL (ref 40–60)
HGB BLD-MCNC: 11.3 G/DL (ref 12–16)
INR PPP: 1.07 (ref 0.84–1.16)
IRON SATN MFR SERPL: 24 % (ref 15–50)
IRON SERPL-MCNC: 72 UG/DL (ref 37–145)
LDLC SERPL CALC-MCNC: 145 MG/DL
LYMPHOCYTES # BLD: 2 K/UL (ref 1–5.1)
LYMPHOCYTES NFR BLD: 32.2 %
MCH RBC QN AUTO: 28.5 PG (ref 26–34)
MCHC RBC AUTO-ENTMCNC: 32.5 G/DL (ref 31–36)
MCV RBC AUTO: 87.9 FL (ref 80–100)
MONOCYTES # BLD: 0.4 K/UL (ref 0–1.3)
MONOCYTES NFR BLD: 6.9 %
NEUTROPHILS # BLD: 3.7 K/UL (ref 1.7–7.7)
NEUTROPHILS NFR BLD: 60.7 %
PLATELET # BLD AUTO: 397 K/UL (ref 135–450)
PMV BLD AUTO: 8.6 FL (ref 5–10.5)
POTASSIUM SERPL-SCNC: 3.8 MMOL/L (ref 3.5–5.1)
PROT SERPL-MCNC: 6.7 G/DL (ref 6.4–8.2)
PROTHROMBIN TIME: 13.9 SEC (ref 11.5–14.8)
RBC # BLD AUTO: 3.95 M/UL (ref 4–5.2)
SODIUM SERPL-SCNC: 143 MMOL/L (ref 136–145)
TIBC SERPL-MCNC: 302 UG/DL (ref 260–445)
TRIGL SERPL-MCNC: 47 MG/DL (ref 0–150)
TSH SERPL DL<=0.005 MIU/L-ACNC: 0.99 UIU/ML (ref 0.27–4.2)
VIT B12 SERPL-MCNC: 1891 PG/ML (ref 211–911)
VLDLC SERPL CALC-MCNC: 9 MG/DL
WBC # BLD AUTO: 6.1 K/UL (ref 4–11)

## 2023-11-21 LAB
EST. AVERAGE GLUCOSE BLD GHB EST-MCNC: 116.9 MG/DL
HBA1C MFR BLD: 5.7 %

## 2023-11-23 LAB
A-TOCOPHEROL VIT E SERPL-MCNC: 16 MG/L (ref 5.5–18)
ANNOTATION COMMENT IMP: NORMAL
BETA+GAMMA TOCOPHEROL SERPL-MCNC: 0.4 MG/L (ref 0–6)
RETINYL PALMITATE SERPL-MCNC: 0.05 MG/L (ref 0–0.1)
VIT A SERPL-MCNC: 0.62 MG/L (ref 0.3–1.2)
VIT B1 BLD-MCNC: 126 NMOL/L (ref 70–180)

## 2024-02-01 ENCOUNTER — TELEPHONE (OUTPATIENT)
Dept: ORTHOPEDIC SURGERY | Age: 54
End: 2024-02-01

## 2024-02-01 DIAGNOSIS — M17.11 PRIMARY OSTEOARTHRITIS OF RIGHT KNEE: ICD-10-CM

## 2024-02-01 DIAGNOSIS — M17.12 PRIMARY OSTEOARTHRITIS OF LEFT KNEE: Primary | ICD-10-CM

## 2024-02-01 NOTE — TELEPHONE ENCOUNTER
General Question     Subject: PRE AUTHORIZATION-GEL  Patient and /or Facility Request: DERRICKLUIZJAYNE BOND  Contact Number: [phone]    PATIENT CALLED TO SPEAK WITH CLINICAL TO REQUEST PRE AUTHORIZATION FOR GEL INJECTION FOR STEPHANI KNEE. INFORMED HER THAT AUTHORIZATION PROCESS CAN TAKE FEW BUSINESS DAYS.      PLEASE ADVISE

## 2024-02-01 NOTE — TELEPHONE ENCOUNTER
I spoke to pt. I ordered Gelsyn 3 for pre-cert, bilateral knee. I let pt know we will call her once we get the response from [facility].

## 2024-02-13 NOTE — TELEPHONE ENCOUNTER
Garrett Isaacs MA  P [facility] Schedule Staff    PIPO 3 () series of 3.  BILATERAL KNEE.    APPROVED FOR BUY & BILL  APPROVED #  [id number].   DATES:  2/01/24 - 8/01//24  PER FAX FROM [employer].  SAVED TO MEDIA.    Left a detailed VM for pt letting her know she may call back to schedule.

## 2024-03-06 ENCOUNTER — HOSPITAL ENCOUNTER (EMERGENCY)
Age: 54
Discharge: HOME | End: 2024-03-06
Attending: EMERGENCY MEDICINE
Payer: COMMERCIAL

## 2024-03-06 ENCOUNTER — APPOINTMENT (OUTPATIENT)
Dept: GENERAL RADIOLOGY | Age: 54
End: 2024-03-06
Payer: COMMERCIAL

## 2024-03-06 VITALS
TEMPERATURE: 98.2 F | RESPIRATION RATE: 16 BRPM | SYSTOLIC BLOOD PRESSURE: 162 MMHG | OXYGEN SATURATION: 99 % | HEART RATE: 90 BPM | DIASTOLIC BLOOD PRESSURE: 89 MMHG

## 2024-03-06 DIAGNOSIS — N39.0 URINARY TRACT INFECTION WITHOUT HEMATURIA, SITE UNSPECIFIED: Primary | ICD-10-CM

## 2024-03-06 DIAGNOSIS — I10 HYPERTENSION, UNSPECIFIED TYPE: ICD-10-CM

## 2024-03-06 LAB
ALBUMIN SERPL-MCNC: 4.1 G/DL (ref 3.4–5)
ALBUMIN/GLOB SERPL: 1.1 {RATIO} (ref 1.1–2.2)
ALP SERPL-CCNC: 82 U/L (ref 40–129)
ALT SERPL-CCNC: 35 U/L (ref 10–40)
ANION GAP SERPL CALCULATED.3IONS-SCNC: 13 MMOL/L (ref 3–16)
AST SERPL-CCNC: 31 U/L (ref 15–37)
BACTERIA URNS QL MICRO: ABNORMAL /HPF
BASOPHILS # BLD: 0.1 K/UL (ref 0–0.2)
BASOPHILS NFR BLD: 1.1 %
BILIRUB SERPL-MCNC: <0.2 MG/DL (ref 0–1)
BILIRUB UR QL STRIP.AUTO: NEGATIVE
BUN SERPL-MCNC: 15 MG/DL (ref 7–20)
CALCIUM SERPL-MCNC: 9.8 MG/DL (ref 8.3–10.6)
CHLORIDE SERPL-SCNC: 104 MMOL/L (ref 99–110)
CLARITY UR: CLEAR
CO2 SERPL-SCNC: 24 MMOL/L (ref 21–32)
COLOR UR: YELLOW
CREAT SERPL-MCNC: 0.9 MG/DL (ref 0.6–1.1)
DEPRECATED RDW RBC AUTO: 13.6 % (ref 12.4–15.4)
EOSINOPHIL # BLD: 0 K/UL (ref 0–0.6)
EOSINOPHIL NFR BLD: 1 %
EPI CELLS #/AREA URNS HPF: ABNORMAL /HPF (ref 0–5)
FLUAV RNA UPPER RESP QL NAA+PROBE: NEGATIVE
FLUBV AG NPH QL: NEGATIVE
GFR SERPLBLD CREATININE-BSD FMLA CKD-EPI: >60 ML/MIN/{1.73_M2}
GLUCOSE SERPL-MCNC: 117 MG/DL (ref 70–99)
GLUCOSE UR STRIP.AUTO-MCNC: NEGATIVE MG/DL
HCT VFR BLD AUTO: 37.7 % (ref 36–48)
HGB BLD-MCNC: 12.2 G/DL (ref 12–16)
HGB UR QL STRIP.AUTO: NEGATIVE
HYALINE CASTS #/AREA URNS LPF: ABNORMAL /LPF (ref 0–2)
KETONES UR STRIP.AUTO-MCNC: ABNORMAL MG/DL
LEUKOCYTE ESTERASE UR QL STRIP.AUTO: ABNORMAL
LYMPHOCYTES # BLD: 1.9 K/UL (ref 1–5.1)
LYMPHOCYTES NFR BLD: 40.3 %
MCH RBC QN AUTO: 28.2 PG (ref 26–34)
MCHC RBC AUTO-ENTMCNC: 32.4 G/DL (ref 31–36)
MCV RBC AUTO: 87 FL (ref 80–100)
MONOCYTES # BLD: 0.3 K/UL (ref 0–1.3)
MUCOUS THREADS #/AREA URNS LPF: ABNORMAL /LPF
NEUTROPHILS # BLD: 2.3 K/UL (ref 1.7–7.7)
NEUTROPHILS NFR BLD: 50.3 %
NITRITE UR QL STRIP.AUTO: NEGATIVE
PLATELET # BLD AUTO: 301 K/UL (ref 135–450)
POTASSIUM SERPL-SCNC: 4 MMOL/L (ref 3.5–5.1)
PROT SERPL-MCNC: 7.7 G/DL (ref 6.4–8.2)
PROT UR STRIP.AUTO-MCNC: NEGATIVE MG/DL
RBC # BLD AUTO: 4.34 M/UL (ref 4–5.2)
RBC #/AREA URNS HPF: ABNORMAL /HPF (ref 0–4)
RENAL EPI CELLS #/AREA UR COMP ASSIST: ABNORMAL /HPF (ref 0–1)
SODIUM SERPL-SCNC: 141 MMOL/L (ref 136–145)
SP GR UR STRIP.AUTO: >=1.03 (ref 1–1.03)
TROPONIN, HIGH SENSITIVITY: <6 NG/L (ref 0–14)
UA DIPSTICK W REFLEX MICRO PNL UR: YES
URN SPEC COLLECT METH UR: ABNORMAL
WBC # BLD AUTO: 4.6 K/UL (ref 4–11)

## 2024-03-06 PROCEDURE — 36415 COLL VENOUS BLD VENIPUNCTURE: CPT

## 2024-03-06 PROCEDURE — 93005 ELECTROCARDIOGRAM TRACING: CPT | Performed by: EMERGENCY MEDICINE

## 2024-03-06 PROCEDURE — 84484 ASSAY OF TROPONIN QUANT: CPT

## 2024-03-06 PROCEDURE — 87086 URINE CULTURE/COLONY COUNT: CPT

## 2024-03-06 PROCEDURE — 87804 INFLUENZA ASSAY W/OPTIC: CPT

## 2024-03-06 PROCEDURE — 81001 URINALYSIS AUTO W/SCOPE: CPT

## 2024-03-06 PROCEDURE — 87635 SARS-COV-2 COVID-19 AMP PRB: CPT

## 2024-03-06 PROCEDURE — 80053 COMPREHEN METABOLIC PANEL: CPT

## 2024-03-06 PROCEDURE — 99285 EMERGENCY DEPT VISIT HI MDM: CPT

## 2024-03-06 PROCEDURE — 85025 COMPLETE CBC W/AUTO DIFF WBC: CPT

## 2024-03-06 PROCEDURE — 71045 X-RAY EXAM CHEST 1 VIEW: CPT

## 2024-03-06 PROCEDURE — 2580000003 HC RX 258: Performed by: EMERGENCY MEDICINE

## 2024-03-06 RX ORDER — 0.9 % SODIUM CHLORIDE 0.9 %
1000 INTRAVENOUS SOLUTION INTRAVENOUS ONCE
Status: COMPLETED | OUTPATIENT
Start: 2024-03-06 | End: 2024-03-06

## 2024-03-06 RX ORDER — NITROFURANTOIN 25; 75 MG/1; MG/1
100 CAPSULE ORAL 2 TIMES DAILY
Qty: 10 CAPSULE | Refills: 0 | Status: SHIPPED | OUTPATIENT
Start: 2024-03-06 | End: 2024-03-11

## 2024-03-06 RX ADMIN — SODIUM CHLORIDE 1000 ML: 9 INJECTION, SOLUTION INTRAVENOUS at 18:54

## 2024-03-06 ASSESSMENT — PAIN SCALES - GENERAL: PAINLEVEL_OUTOF10: 6

## 2024-03-06 ASSESSMENT — PAIN - FUNCTIONAL ASSESSMENT: PAIN_FUNCTIONAL_ASSESSMENT: 0-10

## 2024-03-06 ASSESSMENT — PAIN DESCRIPTION - DESCRIPTORS: DESCRIPTORS: ACHING

## 2024-03-06 ASSESSMENT — PAIN DESCRIPTION - LOCATION: LOCATION: GENERALIZED

## 2024-03-07 LAB
EKG ATRIAL RATE: 81 BPM
EKG P AXIS: 32 DEGREES
EKG P-R INTERVAL: 170 MS
EKG Q-T INTERVAL: 358 MS
EKG QRS DURATION: 70 MS
EKG QTC CALCULATION (BAZETT): 415 MS
EKG R AXIS: -7 DEGREES
EKG VENTRICULAR RATE: 81 BPM

## 2024-03-07 NOTE — ED PROVIDER NOTES
Patient signed out to me.      Patient presented to the emergency department with lightheadedness and chills.  This started a couple of days ago.  She did not have any chest pain, room spinning dizziness, fever.  She did complain of nausea but no emesis.    On my evaluation patient says that she just feels lightheaded when she stands up sometimes.  She denies chest pain or shortness of breath or fever or dysuria.    At time of signout awaiting blood work.  Vital signs show that she is hypertensive but she does have a history of this and takes medication for this.    Her chemistry is unremarkable.  Her LFTs are unremarkable.  CBC is normal.  COVID and flu swabs were negative.  Chest x-ray showed no acute abnormalities.  I did perform an NIH stroke scale and this was negative on my exam.  I discussed all of the findings with the patient and her son at bedside.  Her urinalysis does look like she may have a UTI with 10-20 WBCs and 2+ bacteria.  I did send this for urine culture and I will start her on Macrobid.  I discussed all of this with the patient and she would like to go home at this time.  I did recommend she follow-up with her primary care physician.  Given strict return precautions.    Final Impression  Urinary tract infection  Dizziness     Dennise Moody MD  03/06/24 1681    
(!) 158/97      Pulse 03/06/24 1805 94      Respirations 03/06/24 1805 18      Temp 03/06/24 1805 98.2 °F (36.8 °C)      Temp Source 03/06/24 1805 Oral      SpO2 03/06/24 1805 100 %      Weight --       Height --       Head Circumference --       Peak Flow --       Pain Score --       Pain Loc --       Pain Edu? --       Excl. in GC? --         General appearance: Awake and alert. Cooperative. No acute distress.  HEENT: Normocephalic. Atraumatic. Mucous membranes are moist. Oropharynx is clear.  No tonsillar exudate or uvular deviation.  No focal erythema.  Managing secretions easily.   Neck: No meningismus  Heart/Chest: RRR. No murmurs.    Lungs: Respirations unlabored. CTAB. Good air exchange. Speaking comfortably in full sentences.   Abdomen: Soft. Non-tender. Non-distended. No rebound or guarding.   Musculoskeletal: No extremity edema. No deformity.   Skin: Warm and dry. No acute rashes.   Neurological:   - Alert and oriented to person, place, time, situation.   - CN II-XII intact.   - Full and symmetric strength bilateral upper and lower extremities.   - Sensation intact to light touch in all extremities.   - Normal rapidly alternating movements  - Normal finger to nose.   - Visual fields are full.   - Gait is normal.    - No truncal ataxia.   - Normal test of skew      LABS  I have reviewed all labs for this visit.   No results found for this visit on 03/06/24.      RADIOLOGY  I have reviewed all radiographic studies for this visit.   No orders to display          ECG  EKG interpreted independently by myself.   Rate: normal  Rhythm: NSR  Axis: normal  Intervals: within normal limits  ST Segments: no acute abnormality  T waves: no acute abnormality  Comparison: Compared to 8/17/22,ventricular rate slower by 24 bpm  Impression: NSR with anterior infarct seen on or before 8/17/2022    ED COURSE/MDM    This is a 54-year-old female presenting with complaint of lightheadedness and chills.  On arrival the patient is

## 2024-03-08 ENCOUNTER — OFFICE VISIT (OUTPATIENT)
Dept: ORTHOPEDIC SURGERY | Age: 54
End: 2024-03-08

## 2024-03-08 VITALS — HEIGHT: 64 IN | WEIGHT: 175.8 LBS | BODY MASS INDEX: 30.01 KG/M2

## 2024-03-08 DIAGNOSIS — M17.11 PRIMARY OSTEOARTHRITIS OF RIGHT KNEE: ICD-10-CM

## 2024-03-08 DIAGNOSIS — M17.12 PRIMARY OSTEOARTHRITIS OF LEFT KNEE: Primary | ICD-10-CM

## 2024-03-08 LAB — BACTERIA UR CULT: NORMAL

## 2024-03-08 RX ORDER — TRAMADOL HYDROCHLORIDE 50 MG/1
50 TABLET ORAL EVERY 8 HOURS PRN
Qty: 21 TABLET | Refills: 0 | Status: SHIPPED | OUTPATIENT
Start: 2024-03-08 | End: 2024-03-15

## 2024-03-08 RX ORDER — CYCLOBENZAPRINE HCL 10 MG
10 TABLET ORAL 2 TIMES DAILY PRN
Qty: 40 TABLET | Refills: 0 | Status: SHIPPED | OUTPATIENT
Start: 2024-03-08 | End: 2024-03-28

## 2024-03-08 NOTE — PROGRESS NOTES
Kori Byrne  [id number]  March 8, 2024    Chief Complaint   Patient presents with    Follow-up     Gelsyn #1 bilat knee       Ms. Byrne is here in follow-up regarding her bilateral  knees.  She is having no problems or concerns.  Her pain has significantly worsened. She is here for the first Gelsyn injections.     Physical Exam:   Examination of the bilateral knees reveals no sign of synovitis.   There is minimal to no swelling.    Examination is essentially unchanged.     Impression:  bilateral knee osteoarthritis.      Plan: The patient was explained the risks, benefits and alternatives to injection.  The patient understood the risks and benefits and agreed to proceed.  We will go ahead and administer the first Gelsyn injections into the bilateral knees under sterile conditions.  After a betadine prep of the knee joints, 16.8 mg/2 ml of Gelsyn was injected into the knees.  The patient tolerated the injections rather well.  The patient was instructed to follow up for any signs of infection.        Dipak Kennedy M.D.

## 2024-03-12 ENCOUNTER — OFFICE VISIT (OUTPATIENT)
Dept: BARIATRICS/WEIGHT MGMT | Age: 54
End: 2024-03-12
Payer: COMMERCIAL

## 2024-03-12 VITALS
RESPIRATION RATE: 18 BRPM | OXYGEN SATURATION: 95 % | HEIGHT: 64 IN | BODY MASS INDEX: 29.19 KG/M2 | WEIGHT: 171 LBS | DIASTOLIC BLOOD PRESSURE: 80 MMHG | HEART RATE: 88 BPM | SYSTOLIC BLOOD PRESSURE: 124 MMHG

## 2024-03-12 DIAGNOSIS — Z98.84 S/P LAPAROSCOPIC SLEEVE GASTRECTOMY: ICD-10-CM

## 2024-03-12 DIAGNOSIS — R73.03 PREDIABETES: ICD-10-CM

## 2024-03-12 DIAGNOSIS — E78.2 MIXED HYPERLIPIDEMIA: ICD-10-CM

## 2024-03-12 PROCEDURE — 3079F DIAST BP 80-89 MM HG: CPT | Performed by: SURGERY

## 2024-03-12 PROCEDURE — G8417 CALC BMI ABV UP PARAM F/U: HCPCS | Performed by: SURGERY

## 2024-03-12 PROCEDURE — 3017F COLORECTAL CA SCREEN DOC REV: CPT | Performed by: SURGERY

## 2024-03-12 PROCEDURE — G8484 FLU IMMUNIZE NO ADMIN: HCPCS | Performed by: SURGERY

## 2024-03-12 PROCEDURE — 99214 OFFICE O/P EST MOD 30 MIN: CPT | Performed by: SURGERY

## 2024-03-12 PROCEDURE — 3074F SYST BP LT 130 MM HG: CPT | Performed by: SURGERY

## 2024-03-12 PROCEDURE — 1036F TOBACCO NON-USER: CPT | Performed by: SURGERY

## 2024-03-12 PROCEDURE — G8427 DOCREV CUR MEDS BY ELIG CLIN: HCPCS | Performed by: SURGERY

## 2024-03-12 NOTE — PROGRESS NOTES
Dietary Assessment Note      Vitals:   Vitals:    24 1145   Height: 1.626 m (5' 4\")    Patient stable/unchanged     Total Weight Loss: 68 lbs    Labs reviewed:    Latest Reference Range & Units 23 09:29   Vitamin B-12 211 - 911 pg/mL 1891 (H)   (H): Data is abnormally high    Protein intake: 60-80 grams/day     Fluid intake: 48-64 oz/day    Multivitamin/mineral intake: yes - 1 fusion capsule w/ iron     Calcium intake: yes - 2 calcium soft chews     Other: none    Exercise: yes 30-45 min boxing and walking 5X/ week, [employer] exercise    Nutrition Assessment: 1 yr post-op visit.   Breakfast: meat + salad OR egg + meat   Snack: vegetable OR fruit  Lunch: baby carrots + yogurt  Snack: sf yogurt  Dinner: chx/ fish/ meat + brocc/ potato  Snack: none     Amount able to eat per sittin/2-1 cup, usually 1/2 cup, but more depending what it is    Following  rule: yes    Food Intolerances/issues: none    Client Concerns: feeling faint/dizzy    Goals:   - Purchase more MVI, just ran out  - Track intake using [employer], 7293-3610 kcal, 90-120g CHO, 60-90 PRO, 40-47g FAT - make sure to eat enough  - Aim for 64 oz fluid per day with exercise  - Continue diet and exercise    Plan: f/u per provider     REMEDIOS ROONEY, MS, RD, LD  
  Advised the patient that not getting there weight under control, which hopefully would help with getting some of the comorbidities under control. That could increase risk of complications/worsening of those conditions on the long-term.  During Covid-19 pandemic, CDC and health authorities does classify obese patients as vulnerable and high risk as well.  Which makes weight loss a priority for improvement of their wellbeing and overall health.     We discussed how her excess weight affects her overall health and importance of weight loss, healthy diet and active lifestyle to alleviate those co morbid conditions, otherwise risk deterioration.       - RTC in 6 months  - Nutrition labs         Patient advised that its their responsibility to follow up for care, studies, referrals and/or labs ordered today.       Please note that some or all of this report was generated using voice recognition software. Please notify me in case of any questions about the content of this document, as some errors in transcription may have occurred .

## 2024-03-15 ENCOUNTER — OFFICE VISIT (OUTPATIENT)
Dept: ORTHOPEDIC SURGERY | Age: 54
End: 2024-03-15

## 2024-03-15 VITALS — BODY MASS INDEX: 29.71 KG/M2 | WEIGHT: 174 LBS | HEIGHT: 64 IN

## 2024-03-15 DIAGNOSIS — M17.12 PRIMARY OSTEOARTHRITIS OF LEFT KNEE: Primary | ICD-10-CM

## 2024-03-15 DIAGNOSIS — M17.11 PRIMARY OSTEOARTHRITIS OF RIGHT KNEE: ICD-10-CM

## 2024-03-15 NOTE — PROGRESS NOTES
Kori Byrne  [id number]  March 15, 2024    Chief Complaint   Patient presents with    Follow-up     Gelsyn #2 bilat knee       Ms. Byrne is here in follow-up regarding her bilateral  knees.  She is having no problems or concerns.  Her pain has slightly improved.  She is here for the second Gelsyn injections.     Physical Exam:   Examination of the bilateral knees reveals no sign of synovitis.   There is minimal to no swelling.    Examination is essentially unchanged.     Impression:  bilateral knee osteoarthritis.      Plan: The patient was explained the risks, benefits and alternatives to injection.  The patient understood the risks and benefits and agreed to proceed.  We will go ahead and administer the second Gelsyn injections into the bilateral knees under sterile conditions.  After a betadine prep of the knee joints, 16.8 mg/2 ml of Gelsyn was injected into the knees.  The patient tolerated the injections rather well.  The patient was instructed to follow up for any signs of infection.        Dipak Kennedy M.D.

## 2024-03-22 ENCOUNTER — OFFICE VISIT (OUTPATIENT)
Dept: ORTHOPEDIC SURGERY | Age: 54
End: 2024-03-22

## 2024-03-22 VITALS — HEIGHT: 64 IN | BODY MASS INDEX: 29.87 KG/M2

## 2024-03-22 DIAGNOSIS — M17.11 PRIMARY OSTEOARTHRITIS OF RIGHT KNEE: ICD-10-CM

## 2024-03-22 DIAGNOSIS — M17.12 PRIMARY OSTEOARTHRITIS OF LEFT KNEE: Primary | ICD-10-CM

## 2024-03-22 NOTE — PROGRESS NOTES
Kori Byrne  [id number]  March 22, 2024    Chief Complaint   Patient presents with    Follow-up     Gelsyn #3 bilat knee       Ms. Byrne is here in follow-up regarding her bilateral  knees.  She is having no problems or concerns.  Her pain has improved significantly.  She is here for the third Gelsyn injections.     Physical Exam:   Examination of the bilateral knees reveals no sign of synovitis.   There is minimal to no swelling.    Examination is essentially unchanged.     Impression:  bilateral knee osteoarthritis.      Plan: The patient was explained the risks, benefits and alternatives to injection.  The patient understood the risks and benefits and agreed to proceed.  We will go ahead and administer the third Gelsyn injections into the bilateral knees under sterile conditions.  After a betadine prep of the knee joints, 16.8 mg/2 ml of Gelsyn was injected into the knees.  The patient tolerated the injections rather well.  The patient was instructed to follow up for any signs of infection.        Dipak Kennedy M.D.

## 2024-04-04 ENCOUNTER — TELEPHONE (OUTPATIENT)
Dept: ORTHOPEDIC SURGERY | Age: 54
End: 2024-04-04

## 2024-04-04 DIAGNOSIS — M25.50 MULTIPLE JOINT PAIN: Primary | ICD-10-CM

## 2024-04-04 NOTE — TELEPHONE ENCOUNTER
General Question     Subject: REQUESTING A REFERRAL DR. GUPTA.  Patient and /or Facility Request: Kori Byrne   Contact Number: [phone]

## 2024-04-04 NOTE — TELEPHONE ENCOUNTER
I spoke to pt's daughter, Enedelia. Referral will be faxed to Dr. Catia Scott. A copy is available for  at our , as well.

## 2024-04-08 ENCOUNTER — TELEPHONE (OUTPATIENT)
Dept: ORTHOPEDIC SURGERY | Age: 54
End: 2024-04-08

## 2024-04-08 NOTE — TELEPHONE ENCOUNTER
Referral faxed on 4/4/24 to [phone]. I will re-fax today to [phone], along with notes. Dr. Kennedy did not order labs.

## 2024-04-08 NOTE — TELEPHONE ENCOUNTER
General Question     Subject: REFERRAL TO A RHEUMATOLOGIST  Patient and /or Facility Request: Kori Byrne   Contact Number: [phone]     THE PT WOULD LIKE A REFERRAL SENT TO RHEUMATOLOGIST DR CANDY MARSH, FAX #[phone], ALONG WITH LAB WORK, LAST TWO OFFICE NOTES, AND IMAGING.

## 2024-05-02 DIAGNOSIS — M17.12 PRIMARY OSTEOARTHRITIS OF LEFT KNEE: ICD-10-CM

## 2024-05-02 DIAGNOSIS — M17.11 PRIMARY OSTEOARTHRITIS OF RIGHT KNEE: ICD-10-CM

## 2024-05-02 RX ORDER — CYCLOBENZAPRINE HCL 10 MG
TABLET ORAL
Qty: 40 TABLET | Refills: 0 | OUTPATIENT
Start: 2024-05-02

## 2024-07-08 ENCOUNTER — TELEPHONE (OUTPATIENT)
Dept: BARIATRICS/WEIGHT MGMT | Age: 54
End: 2024-07-08

## 2024-07-08 NOTE — TELEPHONE ENCOUNTER
Pt calling in, asking if she is able to access to the [facility] again, pt stated that she was unable to drive to [place] to use it, when [place] was closer. Pt did not know she was able to go to [place] as well.        Is it ok to give [facility] access again, s/p sleeve 3/13/23

## 2025-01-15 RX ORDER — MELOXICAM 15 MG/1
TABLET ORAL
Qty: 30 TABLET | Refills: 0 | OUTPATIENT
Start: 2025-01-15

## 2025-01-24 ENCOUNTER — OFFICE VISIT (OUTPATIENT)
Dept: ORTHOPEDIC SURGERY | Age: 55
End: 2025-01-24

## 2025-01-24 ENCOUNTER — TELEPHONE (OUTPATIENT)
Dept: ORTHOPEDIC SURGERY | Age: 55
End: 2025-01-24

## 2025-01-24 VITALS — WEIGHT: 174 LBS | HEIGHT: 64 IN | BODY MASS INDEX: 29.71 KG/M2

## 2025-01-24 DIAGNOSIS — M25.551 BILATERAL HIP PAIN: Primary | ICD-10-CM

## 2025-01-24 DIAGNOSIS — M25.552 BILATERAL HIP PAIN: Primary | ICD-10-CM

## 2025-01-24 DIAGNOSIS — M54.32 BILATERAL SCIATICA: ICD-10-CM

## 2025-01-24 DIAGNOSIS — M54.31 BILATERAL SCIATICA: ICD-10-CM

## 2025-01-24 RX ORDER — PREDNISONE 10 MG/1
TABLET ORAL
Qty: 26 TABLET | Refills: 0 | Status: SHIPPED | OUTPATIENT
Start: 2025-01-24

## 2025-01-24 NOTE — TELEPHONE ENCOUNTER
Prednisone is still pending in today's office visit. Will route to [facility].

## 2025-01-24 NOTE — TELEPHONE ENCOUNTER
General Question     Subject: RX REQUEST   Patient and /or Facility Request: Kori Byrne   Contact Number: [phone]     PATIENT CALLING REGARDING RX SHE WAS TOLD BE SENT TO [facility] ON [address]     PLEASE ADVISE

## 2025-01-24 NOTE — PROGRESS NOTES
CHIEF COMPLAINT: Bilateral posterior hip pain/  Sciatica.    HISTORY:  Kori Byrne is a 54 y.o. year old female who is seen today for evaluation of bilateral posterior hip pain that radiate to the leg.  She reports that this started Sept 2024 with no specific injury that started the pain.  She reports that the pain is worse with activity and better with rest.  She reports that the pain is aching and dull in nature.       Past Medical History:   Diagnosis Date    Arthritis     Asthma     Diabetes mellitus (HCC)     patient denies 1/2023    Hypertension     Obesity (BMI 30-39.9)        Past Surgical History:   Procedure Laterality Date    SLEEVE GASTRECTOMY N/A 3/13/2023    LAPAROSCOPIC SLEEVE GASTRECTOMY performed by Nasrin Lomax MD at [facility] OR    UPPER GASTROINTESTINAL ENDOSCOPY N/A 6/24/2022    EGD BIOPSY performed by Nasrin Lomax MD at [facility] ASC ENDOSCOPY    VENTRAL HERNIA REPAIR N/A 3/13/2023    LAPAROSCOPIC VENTRAL HERNIA REPAIR performed by Nasrin Lomax MD at [facility] OR       Social History     Socioeconomic History    Marital status: Single     Spouse name: Not on file    Number of children: Not on file    Years of education: Not on file    Highest education level: Not on file   Occupational History    Not on file   Tobacco Use    Smoking status: Never    Smokeless tobacco: Never   Vaping Use    Vaping status: Never Used   Substance and Sexual Activity    Alcohol use: No    Drug use: No    Sexual activity: Not on file   Other Topics Concern    Not on file   Social History Narrative    ** Merged History Encounter **          Social Determinants of Health     Financial Resource Strain: Not on file   Food Insecurity: Not At Risk (7/29/2024)    Received from [facility]    Food Insecurities     Within the past 12 months, did you worry that your food would run out before you got money to buy more?: No     Within the past 12 months, did the food you bought just not last and you

## 2025-02-03 ENCOUNTER — OFFICE VISIT (OUTPATIENT)
Dept: ORTHOPEDIC SURGERY | Age: 55
End: 2025-02-03
Payer: COMMERCIAL

## 2025-02-03 VITALS — WEIGHT: 175 LBS | BODY MASS INDEX: 29.88 KG/M2 | HEIGHT: 64 IN

## 2025-02-03 DIAGNOSIS — M47.27 MULTILEVEL LUMBOSACRAL SPONDYLOSIS WITH RADICULOPATHY: ICD-10-CM

## 2025-02-03 DIAGNOSIS — M17.11 PRIMARY OSTEOARTHRITIS OF RIGHT KNEE: ICD-10-CM

## 2025-02-03 DIAGNOSIS — M17.12 PRIMARY OSTEOARTHRITIS OF LEFT KNEE: ICD-10-CM

## 2025-02-03 DIAGNOSIS — M54.50 LOW BACK PAIN, UNSPECIFIED BACK PAIN LATERALITY, UNSPECIFIED CHRONICITY, UNSPECIFIED WHETHER SCIATICA PRESENT: Primary | ICD-10-CM

## 2025-02-03 PROCEDURE — G8428 CUR MEDS NOT DOCUMENT: HCPCS | Performed by: PHYSICIAN ASSISTANT

## 2025-02-03 PROCEDURE — G8417 CALC BMI ABV UP PARAM F/U: HCPCS | Performed by: PHYSICIAN ASSISTANT

## 2025-02-03 PROCEDURE — 1036F TOBACCO NON-USER: CPT | Performed by: PHYSICIAN ASSISTANT

## 2025-02-03 PROCEDURE — 3017F COLORECTAL CA SCREEN DOC REV: CPT | Performed by: PHYSICIAN ASSISTANT

## 2025-02-03 PROCEDURE — 99214 OFFICE O/P EST MOD 30 MIN: CPT | Performed by: PHYSICIAN ASSISTANT

## 2025-02-03 RX ORDER — GABAPENTIN 100 MG/1
100 CAPSULE ORAL 3 TIMES DAILY
Qty: 90 CAPSULE | Refills: 0 | Status: SHIPPED | OUTPATIENT
Start: 2025-02-03 | End: 2025-03-05

## 2025-02-03 NOTE — PROGRESS NOTES
Patient speaks broken English.  Daughter was available to help interpret for today's visit.    History of present illness:   Ms. Kori Byrne is a pleasant 54 y.o. female kindly referred by Dr Pretty for consultation regarding her low back pain and bilateral leg pain.   Her pain began 4 years ago.  Pain has steadily worsened since onset.   Back pain 10/10 VAS, right and left buttock pain 0/10 VAS, right and left leg pain 9/10 VAS.   Pain is describes as ache.   She denies numbness and tingling lower extremities.    She denies weakness of her right and left leg.  She denies bowel or bladder dysfunction and saddle anesthesia.   She can sit for a maximum of unlimited minutes and stand for a maximum 30 minutes.   Pain does disrupt her sleep.   Prior medications and treatment tried include Tylenol without relief.      She does see a rheumatologist and has been diagnosed with osteoarthritis.  Daughter has a history of rheumatoid arthritis.    Past medical history:  Her past medical history has been reviewed.  Past Medical History:   Diagnosis Date    Arthritis     Asthma     Diabetes mellitus (HCC)     patient denies 1/2023    Hypertension     Obesity (BMI 30-39.9)        Her past surgical history has been reviewed.  Past Surgical History:   Procedure Laterality Date    SLEEVE GASTRECTOMY N/A 3/13/2023    LAPAROSCOPIC SLEEVE GASTRECTOMY performed by Nasrin Lomax MD at [facility] OR    UPPER GASTROINTESTINAL ENDOSCOPY N/A 6/24/2022    EGD BIOPSY performed by Nasrin Lomax MD at [facility] ASC ENDOSCOPY    VENTRAL HERNIA REPAIR N/A 3/13/2023    LAPAROSCOPIC VENTRAL HERNIA REPAIR performed by Nasrin Lomax MD at [facility] OR         Her medications and allergies were reviewed.  Current Outpatient Medications   Medication Sig Dispense Refill    gabapentin (NEURONTIN) 100 MG capsule Take 1 capsule by mouth 3 times daily for 30 days. Intended supply: 30 days 90 capsule 0    predniSONE (DELTASONE) 10 MG tablet take 3 tabs PO BID x2

## 2025-03-21 ENCOUNTER — HOSPITAL ENCOUNTER (EMERGENCY)
Age: 55
Discharge: HOME | End: 2025-03-21
Payer: COMMERCIAL

## 2025-03-21 VITALS
WEIGHT: 190.04 LBS | TEMPERATURE: 97.6 F | HEIGHT: 65 IN | SYSTOLIC BLOOD PRESSURE: 162 MMHG | DIASTOLIC BLOOD PRESSURE: 101 MMHG | BODY MASS INDEX: 31.66 KG/M2 | HEART RATE: 99 BPM | RESPIRATION RATE: 15 BRPM | OXYGEN SATURATION: 100 %

## 2025-03-21 DIAGNOSIS — J02.9 ACUTE PHARYNGITIS, UNSPECIFIED ETIOLOGY: Primary | ICD-10-CM

## 2025-03-21 PROCEDURE — 99284 EMERGENCY DEPT VISIT MOD MDM: CPT

## 2025-03-21 PROCEDURE — 6370000000 HC RX 637 (ALT 250 FOR IP): Performed by: PHYSICIAN ASSISTANT

## 2025-03-21 PROCEDURE — 96372 THER/PROPH/DIAG INJ SC/IM: CPT

## 2025-03-21 PROCEDURE — 6360000002 HC RX W HCPCS: Performed by: PHYSICIAN ASSISTANT

## 2025-03-21 RX ORDER — ACETAMINOPHEN 500 MG
1000 TABLET ORAL EVERY 8 HOURS PRN
Qty: 60 TABLET | Refills: 0 | Status: SHIPPED | OUTPATIENT
Start: 2025-03-21 | End: 2025-03-31

## 2025-03-21 RX ORDER — DEXAMETHASONE SODIUM PHOSPHATE 4 MG/ML
6 INJECTION, SOLUTION INTRA-ARTICULAR; INTRALESIONAL; INTRAMUSCULAR; INTRAVENOUS; SOFT TISSUE ONCE
Status: COMPLETED | OUTPATIENT
Start: 2025-03-21 | End: 2025-03-21

## 2025-03-21 RX ORDER — IBUPROFEN 400 MG/1
400 TABLET, FILM COATED ORAL ONCE
Status: COMPLETED | OUTPATIENT
Start: 2025-03-21 | End: 2025-03-21

## 2025-03-21 RX ORDER — IBUPROFEN 600 MG/1
600 TABLET, FILM COATED ORAL 3 TIMES DAILY PRN
Qty: 30 TABLET | Refills: 0 | Status: SHIPPED | OUTPATIENT
Start: 2025-03-21

## 2025-03-21 RX ADMIN — IBUPROFEN 400 MG: 400 TABLET, FILM COATED ORAL at 19:25

## 2025-03-21 RX ADMIN — AMOXICILLIN AND CLAVULANATE POTASSIUM 1 TABLET: 875; 125 TABLET, FILM COATED ORAL at 19:25

## 2025-03-21 RX ADMIN — DEXAMETHASONE SODIUM PHOSPHATE 6 MG: 4 INJECTION INTRA-ARTICULAR; INTRALESIONAL; INTRAMUSCULAR; INTRAVENOUS; SOFT TISSUE at 19:26

## 2025-03-21 ASSESSMENT — PAIN - FUNCTIONAL ASSESSMENT
PAIN_FUNCTIONAL_ASSESSMENT: ACTIVITIES ARE NOT PREVENTED
PAIN_FUNCTIONAL_ASSESSMENT: 0-10

## 2025-03-21 ASSESSMENT — PAIN DESCRIPTION - PAIN TYPE: TYPE: ACUTE PAIN

## 2025-03-21 ASSESSMENT — PAIN DESCRIPTION - LOCATION: LOCATION: THROAT

## 2025-03-21 ASSESSMENT — PAIN SCALES - GENERAL: PAINLEVEL_OUTOF10: 10

## 2025-03-21 NOTE — ED PROVIDER NOTES
[facility] EMERGENCY DEPARTMENT  EMERGENCY DEPARTMENT ENCOUNTER        Pt Name: Kori Byrne  MRN: [id number]  Birthdate 1970  Date of evaluation: 3/21/2025  Provider: Francesca Naqvi PA-C  PCP: [facility] (Inactive)  Note Started: 7:21 PM EDT 3/21/25      MICHELE. I have evaluated this patient.        CHIEF COMPLAINT       Chief Complaint   Patient presents with    Pharyngitis     X1 week       HISTORY OF PRESENT ILLNESS: 1 or more Elements     History From:   Patient            Chief Complaint: Pharyngitis    Kori Byrne is a 55 y.o. female who presents to the emergency department with complaint of sore throat that is been present over the past 5 to 6 days.  States it is worse when she eats.  Associated rhinorrhea, body aches, fevers.  She is able to tolerate p.o. she took Mucinex for her symptoms without significant relief.  No known sick contacts.    Nursing Notes were all reviewed and agreed with or any disagreements were addressed in the HPI.    REVIEW OF SYSTEMS :      Review of Systems   All other systems reviewed and are negative.      Positives and Pertinent negatives as per HPI.     SURGICAL HISTORY     Past Surgical History:   Procedure Laterality Date    SLEEVE GASTRECTOMY N/A 3/13/2023    LAPAROSCOPIC SLEEVE GASTRECTOMY performed by Nasrin Lomax MD at [facility] OR    UPPER GASTROINTESTINAL ENDOSCOPY N/A 6/24/2022    EGD BIOPSY performed by Nasrin Lomax MD at [facility] ASC ENDOSCOPY    VENTRAL HERNIA REPAIR N/A 3/13/2023    LAPAROSCOPIC VENTRAL HERNIA REPAIR performed by Nasrin Lomax MD at [facility] OR       CURRENTMEDICATIONS       Previous Medications    ALBUTEROL SULFATE HFA (VENTOLIN HFA) 108 (90 BASE) MCG/ACT INHALER    Inhale 2 puffs into the lungs 4 times daily as needed for Wheezing    CELECOXIB (CELEBREX) 200 MG CAPSULE    Take 1 capsule by mouth daily for 7 days    DICLOFENAC SODIUM (VOLTAREN) 1 % GEL    Apply 2 g topically in the morning and 2 g at noon  steroids here in the emergency department and send with anti-inflammatories, antibiotics.  She was discharged stable condition.    Disposition Considerations (tests considered but not done, Admit vs D/C, Shared Decision Making, Pt Expectation of Test or Tx.): As above    The patient tolerated their visit well.  The patient and / or the family were informed of the results of any tests, a time was given to answer questions, a plan was proposed and they agreed with plan.    I am the Primary Clinician of Record.  FINAL IMPRESSION      1. Acute pharyngitis, unspecified etiology          DISPOSITION/PLAN     DISPOSITION Decision To Discharge 03/21/2025 07:18:58 PM   DISPOSITION CONDITION Stable           PATIENT REFERRED TO:  [facility]  [address]  [facility]  [phone]    Schedule an appointment as soon as possible for a visit in 1 day  for reevaluation    [facility] Emergency Department  [address] [id number] [phone]  Go to   As needed, If symptoms worsen      DISCHARGE MEDICATIONS:  New Prescriptions    ACETAMINOPHEN (TYLENOL) 500 MG TABLET    Take 2 tablets by mouth every 8 hours as needed for Pain    AMOXICILLIN-CLAVULANATE (AUGMENTIN) 875-125 MG PER TABLET    Take 1 tablet by mouth 2 times daily for 7 days    IBUPROFEN (ADVIL;MOTRIN) 600 MG TABLET    Take 1 tablet by mouth 3 times daily as needed for Pain       DISCONTINUED MEDICATIONS:  Discontinued Medications    No medications on file              (Please note that portions of this note were completed with a voice recognition program.  Efforts were made to edit the dictations but occasionally words are mis-transcribed.)    Francesca Naqvi PA-C (electronically signed)       Francesca Naqvi PA-C  03/21/25 6482

## 2025-04-08 ENCOUNTER — APPOINTMENT (OUTPATIENT)
Dept: CT IMAGING | Age: 55
End: 2025-04-08
Payer: COMMERCIAL

## 2025-04-08 ENCOUNTER — HOSPITAL ENCOUNTER (EMERGENCY)
Age: 55
Discharge: HOME | End: 2025-04-08
Payer: COMMERCIAL

## 2025-04-08 VITALS
WEIGHT: 190.26 LBS | BODY MASS INDEX: 31.7 KG/M2 | SYSTOLIC BLOOD PRESSURE: 160 MMHG | HEIGHT: 65 IN | OXYGEN SATURATION: 99 % | DIASTOLIC BLOOD PRESSURE: 99 MMHG | TEMPERATURE: 98.2 F | HEART RATE: 97 BPM

## 2025-04-08 DIAGNOSIS — R07.0 THROAT PAIN: Primary | ICD-10-CM

## 2025-04-08 LAB
ANION GAP SERPL CALCULATED.3IONS-SCNC: 10 MMOL/L (ref 3–16)
BASOPHILS # BLD: 0 K/UL (ref 0–0.2)
BASOPHILS NFR BLD: 0.6 %
BUN SERPL-MCNC: 14 MG/DL (ref 7–20)
CALCIUM SERPL-MCNC: 9.3 MG/DL (ref 8.3–10.6)
CHLORIDE SERPL-SCNC: 104 MMOL/L (ref 99–110)
CO2 SERPL-SCNC: 25 MMOL/L (ref 21–32)
CREAT SERPL-MCNC: 0.9 MG/DL (ref 0.6–1.1)
DEPRECATED RDW RBC AUTO: 13.7 % (ref 12.4–15.4)
EOSINOPHIL # BLD: 0.1 K/UL (ref 0–0.6)
EOSINOPHIL NFR BLD: 1.5 %
GFR SERPLBLD CREATININE-BSD FMLA CKD-EPI: 75 ML/MIN/{1.73_M2}
GLUCOSE SERPL-MCNC: 110 MG/DL (ref 70–99)
HCT VFR BLD AUTO: 36.4 % (ref 36–48)
HGB BLD-MCNC: 11.6 G/DL (ref 12–16)
LACTATE BLDV-SCNC: 0.9 MMOL/L (ref 0.4–2)
LYMPHOCYTES # BLD: 2.4 K/UL (ref 1–5.1)
LYMPHOCYTES NFR BLD: 47.1 %
MCH RBC QN AUTO: 27.7 PG (ref 26–34)
MCHC RBC AUTO-ENTMCNC: 32 G/DL (ref 31–36)
MCV RBC AUTO: 86.6 FL (ref 80–100)
MONOCYTES # BLD: 0.4 K/UL (ref 0–1.3)
MONOCYTES NFR BLD: 7.6 %
NEUTROPHILS # BLD: 2.2 K/UL (ref 1.7–7.7)
NEUTROPHILS NFR BLD: 43.2 %
PLATELET # BLD AUTO: 303 K/UL (ref 135–450)
PMV BLD AUTO: 8.2 FL (ref 5–10.5)
POTASSIUM SERPL-SCNC: 4.3 MMOL/L (ref 3.5–5.1)
RBC # BLD AUTO: 4.2 M/UL (ref 4–5.2)
S PYO AG THROAT QL: NEGATIVE
SODIUM SERPL-SCNC: 139 MMOL/L (ref 136–145)
WBC # BLD AUTO: 5.1 K/UL (ref 4–11)

## 2025-04-08 PROCEDURE — 80048 BASIC METABOLIC PNL TOTAL CA: CPT

## 2025-04-08 PROCEDURE — 6360000002 HC RX W HCPCS: Performed by: PHYSICIAN ASSISTANT

## 2025-04-08 PROCEDURE — 6360000004 HC RX CONTRAST MEDICATION: Performed by: PHYSICIAN ASSISTANT

## 2025-04-08 PROCEDURE — 36415 COLL VENOUS BLD VENIPUNCTURE: CPT

## 2025-04-08 PROCEDURE — 99285 EMERGENCY DEPT VISIT HI MDM: CPT

## 2025-04-08 PROCEDURE — 85025 COMPLETE CBC W/AUTO DIFF WBC: CPT

## 2025-04-08 PROCEDURE — 87880 STREP A ASSAY W/OPTIC: CPT

## 2025-04-08 PROCEDURE — 96374 THER/PROPH/DIAG INJ IV PUSH: CPT

## 2025-04-08 PROCEDURE — 70491 CT SOFT TISSUE NECK W/DYE: CPT

## 2025-04-08 PROCEDURE — 83605 ASSAY OF LACTIC ACID: CPT

## 2025-04-08 RX ORDER — KETOROLAC TROMETHAMINE 30 MG/ML
30 INJECTION, SOLUTION INTRAMUSCULAR; INTRAVENOUS ONCE
Status: COMPLETED | OUTPATIENT
Start: 2025-04-08 | End: 2025-04-08

## 2025-04-08 RX ORDER — TRAMADOL HYDROCHLORIDE 50 MG/1
50 TABLET ORAL EVERY 6 HOURS PRN
Qty: 12 TABLET | Refills: 0 | Status: SHIPPED | OUTPATIENT
Start: 2025-04-08 | End: 2025-04-13

## 2025-04-08 RX ORDER — IOPAMIDOL 755 MG/ML
75 INJECTION, SOLUTION INTRAVASCULAR
Status: COMPLETED | OUTPATIENT
Start: 2025-04-08 | End: 2025-04-08

## 2025-04-08 RX ADMIN — IOPAMIDOL 75 ML: 755 INJECTION, SOLUTION INTRAVENOUS at 19:43

## 2025-04-08 RX ADMIN — KETOROLAC TROMETHAMINE 30 MG: 30 INJECTION, SOLUTION INTRAMUSCULAR at 18:44

## 2025-04-08 NOTE — ED PROVIDER NOTES
patient was advised to return to the emergency department if symptoms should significantly worsen or if new and concerning symptoms should appear.     I estimate there is LOW risk for PNEUMONIA, MENINGITIS, PERITONSILLAR ABSCESS, SEPSIS, MALIGNANT OTITIS EXTERNA, OR EPIGLOTTITIS thus I consider the discharge disposition reasonable.     CRITICAL CARE TIME   None.    FINAL IMPRESSION      1. Throat pain          DISPOSITION/PLAN   DISPOSITION Decision To Discharge 04/08/2025 09:03:20 PM   DISPOSITION CONDITION Stable           PATIENT REFERRED TO:  Vijay Lua MD  [address] [id number] [phone]    Schedule an appointment as soon as possible for a visit   for Ear, Nose & Throat follow-up care      DISCHARGE MEDICATIONS:  Discharge Medication List as of 4/8/2025  9:05 PM        START taking these medications    Details   traMADol (ULTRAM) 50 MG tablet Take 1 tablet by mouth every 6 hours as needed for Pain for up to 5 days. Max Daily Amount: 200 mg, Disp-12 tablet, R-0Normal             DISCONTINUED MEDICATIONS:  Discharge Medication List as of 4/8/2025  9:05 PM               (Please note that portions of this note were completed with a voice recognition program.  Efforts were made to edit the dictations but occasionally words are mis-transcribed.)    CATRACHITO Wilder (electronically signed)        Helder Zhou PA  04/08/25 0920

## 2025-04-23 ENCOUNTER — OFFICE VISIT (OUTPATIENT)
Dept: ENT CLINIC | Age: 55
End: 2025-04-23
Payer: COMMERCIAL

## 2025-04-23 VITALS
DIASTOLIC BLOOD PRESSURE: 85 MMHG | HEIGHT: 65 IN | SYSTOLIC BLOOD PRESSURE: 138 MMHG | BODY MASS INDEX: 31.49 KG/M2 | WEIGHT: 189 LBS | HEART RATE: 91 BPM | OXYGEN SATURATION: 98 %

## 2025-04-23 DIAGNOSIS — K21.9 LARYNGOPHARYNGEAL REFLUX (LPR): ICD-10-CM

## 2025-04-23 DIAGNOSIS — J31.0 CHRONIC RHINITIS: Primary | ICD-10-CM

## 2025-04-23 DIAGNOSIS — R09.89 PHLEGM IN THROAT: ICD-10-CM

## 2025-04-23 DIAGNOSIS — R49.0 MUSCLE TENSION DYSPHONIA: ICD-10-CM

## 2025-04-23 PROCEDURE — 3017F COLORECTAL CA SCREEN DOC REV: CPT | Performed by: STUDENT IN AN ORGANIZED HEALTH CARE EDUCATION/TRAINING PROGRAM

## 2025-04-23 PROCEDURE — G8427 DOCREV CUR MEDS BY ELIG CLIN: HCPCS | Performed by: STUDENT IN AN ORGANIZED HEALTH CARE EDUCATION/TRAINING PROGRAM

## 2025-04-23 PROCEDURE — 3079F DIAST BP 80-89 MM HG: CPT | Performed by: STUDENT IN AN ORGANIZED HEALTH CARE EDUCATION/TRAINING PROGRAM

## 2025-04-23 PROCEDURE — 99204 OFFICE O/P NEW MOD 45 MIN: CPT | Performed by: STUDENT IN AN ORGANIZED HEALTH CARE EDUCATION/TRAINING PROGRAM

## 2025-04-23 PROCEDURE — 3075F SYST BP GE 130 - 139MM HG: CPT | Performed by: STUDENT IN AN ORGANIZED HEALTH CARE EDUCATION/TRAINING PROGRAM

## 2025-04-23 PROCEDURE — G8417 CALC BMI ABV UP PARAM F/U: HCPCS | Performed by: STUDENT IN AN ORGANIZED HEALTH CARE EDUCATION/TRAINING PROGRAM

## 2025-04-23 PROCEDURE — 1036F TOBACCO NON-USER: CPT | Performed by: STUDENT IN AN ORGANIZED HEALTH CARE EDUCATION/TRAINING PROGRAM

## 2025-04-23 RX ORDER — PANTOPRAZOLE SODIUM 40 MG/1
40 TABLET, DELAYED RELEASE ORAL DAILY
Qty: 90 TABLET | Refills: 0 | Status: SHIPPED | OUTPATIENT
Start: 2025-04-23 | End: 2025-07-22

## 2025-04-23 RX ORDER — FLUTICASONE PROPIONATE 50 MCG
2 SPRAY, SUSPENSION (ML) NASAL 2 TIMES DAILY
Qty: 2 EACH | Refills: 5 | Status: SHIPPED | OUTPATIENT
Start: 2025-04-23

## 2025-04-23 NOTE — PROGRESS NOTES
[facility]  DIVISION OF OTOLARYNGOLOGY- HEAD & NECK SURGERY  CONSULT      Kori Byrne (:  1970) is a 55 y.o. female, here for evaluation of the following chief complaint(s):  Oral Swelling and Sinus Problem      ASSESSMENT/PLAN:  1. Chronic rhinitis  2. Laryngopharyngeal reflux (LPR)  3. Phlegm in throat  4. Muscle tension dysphonia         This is a very pleasant 55 y.o. female here today for evaluation of the the above-noted complaints.      Assessment & Plan  - CT sinus scan ordered by another provider reviewed by myself.  No evidence of chronic sinusitis  - Suspect that the patient's throat pain is related to either laryngopharyngeal reflux or muscle tension dysphonia, or both.  - Begin Protonix x 90 days  - Dietary and lifestyle modifications discussed with patient  - Begin fluticasone for chronic allergic rhinitis  - Follow-up in 3 months  - Possible speech therapy referral    Medical Decision Making:  The following items were considered in medical decision making:  Independent review of images  Review / order clinical lab tests  Review / order radiology tests  Decision to obtain old records  Review and summation of old records as accessed through [facility] if applicable    SUBJECTIVE/OBJECTIVE:  HPI    History of Present Illness  The patient is a 55-year-old female who presents for evaluation of a sore throat.    Sore Throat  - Persistent pharyngitis for the past 10 days  - Managing with Mucinex with no significant relief  - Sought emergency care and informed there was no infection  - No symptoms of gastroesophageal reflux disease or heartburn  - History of asthma  - Dealing with this issue for approximately 5 years  - Tonsils intact  - No history of head or neck surgeries or ear-related issues  - Occasionally uses nasal sprays  - Previously prescribed antibiotics, which did not alleviate symptoms  - Experiences dysphagia, particularly when consuming food  - Reports odynophagia    Chronic

## 2025-07-03 ENCOUNTER — APPOINTMENT (OUTPATIENT)
Dept: GENERAL RADIOLOGY | Age: 55
End: 2025-07-03
Payer: COMMERCIAL

## 2025-07-03 ENCOUNTER — APPOINTMENT (OUTPATIENT)
Dept: CT IMAGING | Age: 55
End: 2025-07-03
Payer: COMMERCIAL

## 2025-07-03 ENCOUNTER — HOSPITAL ENCOUNTER (EMERGENCY)
Age: 55
Discharge: HOME | End: 2025-07-03
Attending: EMERGENCY MEDICINE
Payer: COMMERCIAL

## 2025-07-03 VITALS
BODY MASS INDEX: 32.84 KG/M2 | SYSTOLIC BLOOD PRESSURE: 144 MMHG | TEMPERATURE: 97 F | OXYGEN SATURATION: 100 % | HEART RATE: 96 BPM | RESPIRATION RATE: 18 BRPM | DIASTOLIC BLOOD PRESSURE: 83 MMHG | WEIGHT: 197.09 LBS | HEIGHT: 65 IN

## 2025-07-03 DIAGNOSIS — M54.50 ACUTE EXACERBATION OF CHRONIC LOW BACK PAIN: Primary | ICD-10-CM

## 2025-07-03 DIAGNOSIS — G89.29 ACUTE EXACERBATION OF CHRONIC LOW BACK PAIN: Primary | ICD-10-CM

## 2025-07-03 DIAGNOSIS — M25.512 ACUTE PAIN OF LEFT SHOULDER: ICD-10-CM

## 2025-07-03 PROCEDURE — 72131 CT LUMBAR SPINE W/O DYE: CPT

## 2025-07-03 PROCEDURE — 73030 X-RAY EXAM OF SHOULDER: CPT

## 2025-07-03 PROCEDURE — 96372 THER/PROPH/DIAG INJ SC/IM: CPT

## 2025-07-03 PROCEDURE — 6360000002 HC RX W HCPCS: Performed by: PHYSICIAN ASSISTANT

## 2025-07-03 PROCEDURE — 6370000000 HC RX 637 (ALT 250 FOR IP): Performed by: PHYSICIAN ASSISTANT

## 2025-07-03 PROCEDURE — 99284 EMERGENCY DEPT VISIT MOD MDM: CPT

## 2025-07-03 RX ORDER — OXYCODONE AND ACETAMINOPHEN 5; 325 MG/1; MG/1
1 TABLET ORAL ONCE
Refills: 0 | Status: COMPLETED | OUTPATIENT
Start: 2025-07-03 | End: 2025-07-03

## 2025-07-03 RX ORDER — HYDROCODONE BITARTRATE AND ACETAMINOPHEN 5; 325 MG/1; MG/1
1 TABLET ORAL EVERY 6 HOURS PRN
Qty: 10 TABLET | Refills: 0 | Status: SHIPPED | OUTPATIENT
Start: 2025-07-03 | End: 2025-07-06

## 2025-07-03 RX ORDER — METHOCARBAMOL 750 MG/1
750 TABLET, FILM COATED ORAL ONCE
Status: COMPLETED | OUTPATIENT
Start: 2025-07-03 | End: 2025-07-03

## 2025-07-03 RX ORDER — LIDOCAINE 50 MG/G
1 PATCH TOPICAL DAILY
Qty: 20 PATCH | Refills: 0 | Status: SHIPPED | OUTPATIENT
Start: 2025-07-03 | End: 2025-07-23

## 2025-07-03 RX ORDER — KETOROLAC TROMETHAMINE 30 MG/ML
60 INJECTION, SOLUTION INTRAMUSCULAR; INTRAVENOUS ONCE
Status: COMPLETED | OUTPATIENT
Start: 2025-07-03 | End: 2025-07-03

## 2025-07-03 RX ORDER — METHYLPREDNISOLONE 4 MG/1
TABLET ORAL
Qty: 1 KIT | Refills: 0 | Status: SHIPPED | OUTPATIENT
Start: 2025-07-03

## 2025-07-03 RX ORDER — METHOCARBAMOL 750 MG/1
750 TABLET, FILM COATED ORAL 2 TIMES DAILY
Qty: 20 TABLET | Refills: 0 | Status: SHIPPED | OUTPATIENT
Start: 2025-07-03

## 2025-07-03 RX ADMIN — METHOCARBAMOL 750 MG: 750 TABLET ORAL at 19:46

## 2025-07-03 RX ADMIN — OXYCODONE AND ACETAMINOPHEN 1 TABLET: 5; 325 TABLET ORAL at 19:46

## 2025-07-03 RX ADMIN — KETOROLAC TROMETHAMINE 60 MG: 60 INJECTION, SOLUTION INTRAMUSCULAR at 19:46

## 2025-07-03 ASSESSMENT — ENCOUNTER SYMPTOMS
ABDOMINAL PAIN: 0
SHORTNESS OF BREATH: 0
COUGH: 0
NAUSEA: 0
SORE THROAT: 0
BACK PAIN: 1
EYE PAIN: 0
VOMITING: 0

## 2025-07-03 ASSESSMENT — PAIN DESCRIPTION - ORIENTATION
ORIENTATION: RIGHT;LEFT
ORIENTATION: RIGHT;LEFT

## 2025-07-03 ASSESSMENT — PAIN DESCRIPTION - PAIN TYPE
TYPE: ACUTE PAIN
TYPE: ACUTE PAIN

## 2025-07-03 ASSESSMENT — PAIN DESCRIPTION - DESCRIPTORS: DESCRIPTORS: ACHING;SHARP

## 2025-07-03 ASSESSMENT — PAIN DESCRIPTION - LOCATION
LOCATION: BACK
LOCATION: BACK

## 2025-07-03 ASSESSMENT — PAIN SCALES - GENERAL
PAINLEVEL_OUTOF10: 6
PAINLEVEL_OUTOF10: 10

## 2025-07-03 ASSESSMENT — PAIN - FUNCTIONAL ASSESSMENT: PAIN_FUNCTIONAL_ASSESSMENT: PREVENTS OR INTERFERES SOME ACTIVE ACTIVITIES AND ADLS

## 2025-07-03 ASSESSMENT — PAIN DESCRIPTION - FREQUENCY: FREQUENCY: CONTINUOUS

## 2025-07-03 ASSESSMENT — PAIN DESCRIPTION - DIRECTION
RADIATING_TOWARDS: LEFT AND RIGHT HIP
RADIATING_TOWARDS: RIGHT AND LEFT HIP

## 2025-07-03 NOTE — ED NOTES
Walked back to room.   Gait slow and steady.    Hx of lower back pain for 2+ months.   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   Initially reports lower back pain radiating down both legs to toes at 10-then said pain is entire back from upper to lower back at 10.    Has seen her PCP and [facility] orthopedic specialist.   Has done therapy and taken gabapentin.   Out of all meds .  Taking OTC motrin/tylenol.

## 2025-07-03 NOTE — ED NOTES
Report to Rebeca RAINEY who will assume care .   Resting on stretcher.    Pain in back at 10.  Ice pack already given.   Repositioned for comfort.

## 2025-07-04 NOTE — ED NOTES
D/C: Order noted for d/c. Pt confirmed d/c paperwork  have correct name. Discharge and education instructions reviewed with patient. Teach-back successful.  Pt verbalized understanding. Pt denied questions at this time. No acute distress noted. Patient instructed to follow-up as noted - return to emergency department if symptoms worsen. Patient verbalized understanding. Discharged per EDMD with discharge instructions. Pt discharged  to private vehicle. Patient stable upon departure. Thanked patient for choosing [facility] for care.

## 2025-07-04 NOTE — ED PROVIDER NOTES
[facility] EMERGENCY DEPARTMENT  EMERGENCY DEPARTMENT ENCOUNTER      Pt Name: Kori Byrne  MRN: [id number]  Birthdate 1970  Date of evaluation: 7/3/2025  Provider: BELLA DIAZ DO    CHIEF COMPLAINT  Chief Complaint   Patient presents with    Back Pain     Hx of lower back pain for 2+ months.   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   Initially reports lower back pain radiating down both legs to toes at 10-then said pain is entire back from upper to lower back at 10.    Has seen her PCP and [facility] orthopedic specialist.   Has done therapy and taken gabapentin.   Out of all meds .  Taking OTC motrin/tylenol.          I have fully participated in the care of Kori Byrne and have had a face-to-face evaluation. I have reviewed and agree with all pertinent clinical information, and midlevel provider's history, and physical exam. I have also reviewed the labs and imaging studies and treatment plan. I have also reviewed and agree with the medications, allergies and past medical history section for this Kori Byrne. I agree with the diagnosis, and I concur.    I personally saw the patient and made/approved the management plan and take responsibility for the patient management.      This patient is at risk for a communicable infection.  Therefore, personal protection equipment consisting of a mask was worn for the exam.    Past Medical History:   Diagnosis Date    Arthritis     Asthma     Diabetes mellitus (HCC)     patient denies 1/2023    Hypertension     Obesity (BMI 30-39.9)        MDM:  History: Kori Byrne is a 55 y.o. female who presents with low back pain.  She has a history of low back pain.  She states her pain got worse a week ago when she was was lifting a baby while babysitting.  She lifted several times and developed pain after that.  She is states that radiates down both legs.  Described as a 10.  She denies any 
sounds. No murmur heard.     No friction rub. No gallop.   Pulmonary:      Effort: Pulmonary effort is normal. No respiratory distress.      Breath sounds: Normal breath sounds. No wheezing or rales.   Chest:      Chest wall: No tenderness.   Abdominal:      General: Bowel sounds are normal. There is no distension.      Palpations: Abdomen is soft. There is no mass.      Tenderness: There is no abdominal tenderness. There is no guarding or rebound.   Musculoskeletal:      Left shoulder: Tenderness and bony tenderness present. No swelling or deformity. Decreased range of motion.        Arms:       Cervical back: Normal.      Thoracic back: Normal.      Lumbar back: Tenderness and bony tenderness present. No swelling or deformity. Decreased range of motion. Negative right straight leg raise test and negative left straight leg raise test.        Back:    Neurological:      General: No focal deficit present.      Mental Status: She is alert.         MEDICAL DECISION MAKING    Vitals:    Vitals:    07/03/25 1825   BP: (!) 144/83   Pulse: 96   Resp: 18   Temp: 97 °F (36.1 °C)   TempSrc: Oral   SpO2: 100%   Weight: 89.4 kg (197 lb 1.5 oz)   Height: 1.651 m (5' 5\")       LABS:Labs Reviewed - No data to display     Remainder of labs reviewed and were negative at this time or not returned at the time of this note.    RADIOLOGY:   Non-plain film images such as CT, Ultrasound and MRI are read by the radiologist.      X-Ray Independently interpreted by me:     Interpretation per the Radiologist below, if available at the time of this note:    XR SHOULDER LEFT (MIN 2 VIEWS)    (Results Pending)   CT LUMBAR SPINE WO CONTRAST    (Results Pending)     No results found.   No results found.    MEDICAL DECISION MAKING / ED COURSE:      PROCEDURES:   Procedures    Patient was given:  Medications   oxyCODONE-acetaminophen (PERCOCET) 5-325 MG per tablet 1 tablet (1 tablet Oral Given 7/3/25 1946)   ketorolac (TORADOL) injection 60 mg (60

## 2025-07-04 NOTE — DISCHARGE INSTRUCTIONS
Take prescribed medication as prescribed only  Follow-up with your orthopedics doctor Arebi  Lidocaine patches only to be worn for 12 hours.  12 hours on and 12 hours off before replacing with another patch.  When not wearing the lidocaine patch you can apply heat to your back area 3-4 times a day 30 minutes on

## 2025-07-18 ENCOUNTER — OFFICE VISIT (OUTPATIENT)
Dept: ORTHOPEDIC SURGERY | Age: 55
End: 2025-07-18
Payer: COMMERCIAL

## 2025-07-18 DIAGNOSIS — M54.32 BILATERAL SCIATICA: ICD-10-CM

## 2025-07-18 DIAGNOSIS — M79.672 PAIN IN BOTH FEET: Primary | ICD-10-CM

## 2025-07-18 DIAGNOSIS — M54.31 BILATERAL SCIATICA: ICD-10-CM

## 2025-07-18 DIAGNOSIS — M79.671 PAIN IN BOTH FEET: Primary | ICD-10-CM

## 2025-07-18 PROCEDURE — G8417 CALC BMI ABV UP PARAM F/U: HCPCS | Performed by: ORTHOPAEDIC SURGERY

## 2025-07-18 PROCEDURE — 99214 OFFICE O/P EST MOD 30 MIN: CPT | Performed by: ORTHOPAEDIC SURGERY

## 2025-07-18 PROCEDURE — 3017F COLORECTAL CA SCREEN DOC REV: CPT | Performed by: ORTHOPAEDIC SURGERY

## 2025-07-18 PROCEDURE — 1036F TOBACCO NON-USER: CPT | Performed by: ORTHOPAEDIC SURGERY

## 2025-07-18 PROCEDURE — G8428 CUR MEDS NOT DOCUMENT: HCPCS | Performed by: ORTHOPAEDIC SURGERY

## 2025-07-18 RX ORDER — PREDNISONE 10 MG/1
TABLET ORAL
Qty: 26 TABLET | Refills: 0 | Status: SHIPPED | OUTPATIENT
Start: 2025-07-18

## 2025-07-18 NOTE — PROGRESS NOTES
CHIEF COMPLAINT: Bilateral posterior hip pain with LPB/  Sciatica.    HISTORY:  Kori Byrne is a 55 y.o. year old female who is seen today for evaluation of bilateral posterior hip pain that radiate to the leg.  She reports that this started 5 years ago but got worse in Sept 2024 with no specific injury that started the pain.  She reports that the pain is worse with activity and better with rest.  She reports that the pain is aching and dull in nature.       Past Medical History:   Diagnosis Date    Arthritis     Asthma     Diabetes mellitus (HCC)     patient denies 1/2023    Hypertension     Obesity (BMI 30-39.9)        Past Surgical History:   Procedure Laterality Date    SLEEVE GASTRECTOMY N/A 3/13/2023    LAPAROSCOPIC SLEEVE GASTRECTOMY performed by Nasrin Lomax MD at [facility] OR    UPPER GASTROINTESTINAL ENDOSCOPY N/A 6/24/2022    EGD BIOPSY performed by Nasrin Lomax MD at [facility] ASC ENDOSCOPY    VENTRAL HERNIA REPAIR N/A 3/13/2023    LAPAROSCOPIC VENTRAL HERNIA REPAIR performed by Nasrin Lomax MD at [facility] OR       Social History     Socioeconomic History    Marital status: Single     Spouse name: Not on file    Number of children: Not on file    Years of education: Not on file    Highest education level: Not on file   Occupational History    Not on file   Tobacco Use    Smoking status: Never    Smokeless tobacco: Never   Vaping Use    Vaping status: Never Used   Substance and Sexual Activity    Alcohol use: No    Drug use: No    Sexual activity: Not on file   Other Topics Concern    Not on file   Social History Narrative    ** Merged History Encounter **          Social Drivers of Health     Financial Resource Strain: Not on file   Food Insecurity: Not At Risk (7/29/2024)    Received from [facility]    Food Insecurities     Within the past 12 months, did you worry that your food would run out before you got money to buy more?: No     Within the past 12 months, did the food

## 2025-07-22 ENCOUNTER — OFFICE VISIT (OUTPATIENT)
Dept: ORTHOPEDIC SURGERY | Age: 55
End: 2025-07-22
Payer: COMMERCIAL

## 2025-07-22 VITALS — BODY MASS INDEX: 32.8 KG/M2 | HEIGHT: 65 IN

## 2025-07-22 DIAGNOSIS — M47.27 MULTILEVEL LUMBOSACRAL SPONDYLOSIS WITH RADICULOPATHY: Primary | ICD-10-CM

## 2025-07-22 PROCEDURE — 1036F TOBACCO NON-USER: CPT | Performed by: PHYSICIAN ASSISTANT

## 2025-07-22 PROCEDURE — G8427 DOCREV CUR MEDS BY ELIG CLIN: HCPCS | Performed by: PHYSICIAN ASSISTANT

## 2025-07-22 PROCEDURE — 99214 OFFICE O/P EST MOD 30 MIN: CPT | Performed by: PHYSICIAN ASSISTANT

## 2025-07-22 PROCEDURE — 3017F COLORECTAL CA SCREEN DOC REV: CPT | Performed by: PHYSICIAN ASSISTANT

## 2025-07-22 PROCEDURE — G8417 CALC BMI ABV UP PARAM F/U: HCPCS | Performed by: PHYSICIAN ASSISTANT

## 2025-07-22 NOTE — PROGRESS NOTES
Subjective:      Patient ID: Kori Byrne is a 55 y.o. female who is here for follow up evaluation of worsening low back and leg pain.  Last seen for same complaints 2/3/2025.  At that time physical therapy was recommended but she did not go to therapy.  Pain today 10/10 VAS.    HPI 2/3/2025:  History of present illness:         Ms. Kori Byrne is a pleasant 54 y.o. female kindly referred by Dr Pretty for consultation regarding her low back pain and bilateral leg pain.   Her pain began 4 years ago.  Pain has steadily worsened since onset.   Back pain 10/10 VAS, right and left buttock pain 0/10 VAS, right and left leg pain 9/10 VAS.   Pain is describes as ache.   She denies numbness and tingling lower extremities.    She denies weakness of her right and left leg.  She denies bowel or bladder dysfunction and saddle anesthesia.   She can sit for a maximum of unlimited minutes and stand for a maximum 30 minutes.   Pain does disrupt her sleep.   Prior medications and treatment tried include Tylenol without relief.        Review Of Systems:   Significant for back pain and negative for recent weight loss, fatigue, chills, visual disturbances, blood in stool or urine, recent infection.        Past Medical History:   Diagnosis Date    Arthritis     Asthma     Diabetes mellitus (HCC)     patient denies 1/2023    Hypertension     Obesity (BMI 30-39.9)        History reviewed. No pertinent family history.    Past Surgical History:   Procedure Laterality Date    SLEEVE GASTRECTOMY N/A 3/13/2023    LAPAROSCOPIC SLEEVE GASTRECTOMY performed by Nasrin Lomax MD at [facility] OR    UPPER GASTROINTESTINAL ENDOSCOPY N/A 6/24/2022    EGD BIOPSY performed by Nasrin Lomax MD at [facility] ASC ENDOSCOPY    VENTRAL HERNIA REPAIR N/A 3/13/2023    LAPAROSCOPIC VENTRAL HERNIA REPAIR performed by Nasrin Lomax MD at [facility] OR       Social History     Occupational History    Not on file   Tobacco Use    Smoking status: Never    Smokeless

## 2025-07-24 ENCOUNTER — HOSPITAL ENCOUNTER (OUTPATIENT)
Dept: MRI IMAGING | Age: 55
Discharge: HOME | End: 2025-07-24
Payer: COMMERCIAL

## 2025-07-24 DIAGNOSIS — M47.27 MULTILEVEL LUMBOSACRAL SPONDYLOSIS WITH RADICULOPATHY: ICD-10-CM

## 2025-07-24 PROCEDURE — 72148 MRI LUMBAR SPINE W/O DYE: CPT

## 2025-08-14 ENCOUNTER — OFFICE VISIT (OUTPATIENT)
Dept: ORTHOPEDIC SURGERY | Age: 55
End: 2025-08-14
Payer: COMMERCIAL

## 2025-08-14 VITALS — HEIGHT: 65 IN | BODY MASS INDEX: 32.82 KG/M2 | WEIGHT: 197 LBS

## 2025-08-14 DIAGNOSIS — M47.27 MULTILEVEL LUMBOSACRAL SPONDYLOSIS WITH RADICULOPATHY: Primary | ICD-10-CM

## 2025-08-14 DIAGNOSIS — M17.12 PRIMARY OSTEOARTHRITIS OF LEFT KNEE: ICD-10-CM

## 2025-08-14 DIAGNOSIS — M48.062 LUMBAR STENOSIS WITH NEUROGENIC CLAUDICATION: ICD-10-CM

## 2025-08-14 DIAGNOSIS — M17.11 PRIMARY OSTEOARTHRITIS OF RIGHT KNEE: ICD-10-CM

## 2025-08-14 PROCEDURE — 99214 OFFICE O/P EST MOD 30 MIN: CPT | Performed by: PHYSICIAN ASSISTANT

## 2025-08-14 PROCEDURE — G8417 CALC BMI ABV UP PARAM F/U: HCPCS | Performed by: PHYSICIAN ASSISTANT

## 2025-08-14 PROCEDURE — 1036F TOBACCO NON-USER: CPT | Performed by: PHYSICIAN ASSISTANT

## 2025-08-14 PROCEDURE — 3017F COLORECTAL CA SCREEN DOC REV: CPT | Performed by: PHYSICIAN ASSISTANT

## 2025-08-14 PROCEDURE — G8428 CUR MEDS NOT DOCUMENT: HCPCS | Performed by: PHYSICIAN ASSISTANT

## 2025-08-14 RX ORDER — GABAPENTIN 100 MG/1
100 CAPSULE ORAL 3 TIMES DAILY
Qty: 90 CAPSULE | Refills: 0 | Status: SHIPPED | OUTPATIENT
Start: 2025-08-14 | End: 2025-08-14 | Stop reason: CLARIF

## 2025-08-14 RX ORDER — CYCLOBENZAPRINE HCL 10 MG
10 TABLET ORAL 3 TIMES DAILY PRN
Qty: 90 TABLET | Refills: 0 | Status: SHIPPED | OUTPATIENT
Start: 2025-08-14 | End: 2025-09-13

## 2025-08-14 RX ORDER — IBUPROFEN 800 MG/1
800 TABLET, FILM COATED ORAL EVERY 8 HOURS PRN
Qty: 90 TABLET | Refills: 0 | Status: SHIPPED | OUTPATIENT
Start: 2025-08-14

## 2025-08-14 RX ORDER — LIDOCAINE 50 MG/G
1 PATCH TOPICAL DAILY
Qty: 20 PATCH | Refills: 0 | Status: SHIPPED | OUTPATIENT
Start: 2025-08-14 | End: 2025-08-14 | Stop reason: CLARIF

## 2025-08-14 RX ORDER — GABAPENTIN 300 MG/1
300 CAPSULE ORAL 3 TIMES DAILY
Qty: 90 CAPSULE | Refills: 0 | Status: SHIPPED | OUTPATIENT
Start: 2025-08-14 | End: 2025-09-13

## 2025-08-14 RX ORDER — CYCLOBENZAPRINE HCL 10 MG
10 TABLET ORAL 2 TIMES DAILY PRN
Qty: 40 TABLET | Refills: 0 | Status: SHIPPED | OUTPATIENT
Start: 2025-08-14 | End: 2025-08-14 | Stop reason: CLARIF

## (undated) DEVICE — SPONGE,LAP,4"X18",XR,ST,5/PK,40PK/CS: Brand: MEDLINE INDUSTRIES, INC.

## (undated) DEVICE — LAPAROSCOPIC SCISSORS: Brand: EPIX LAPAROSCOPIC SCISSORS

## (undated) DEVICE — BW-412T DISP COMBO CLEANING BRUSH: Brand: SINGLE USE COMBINATION CLEANING BRUSH

## (undated) DEVICE — NEEDLE,22GX1.5",REG,BEVEL: Brand: MEDLINE

## (undated) DEVICE — GOWN,AURORA,NONREINF,RAGLAN,XXL,STERILE: Brand: MEDLINE

## (undated) DEVICE — STERILE POLYISOPRENE POWDER-FREE SURGICAL GLOVES: Brand: PROTEXIS

## (undated) DEVICE — ARM CRADLE: Brand: DEVON

## (undated) DEVICE — SUTURE ETHBND EXCEL SZ 0 L30IN NONABSORBABLE GRN CT1 L36MM X424H

## (undated) DEVICE — TROCAR SLEEVE: Brand: KII ® LOW PROFILE SLEEVE

## (undated) DEVICE — LAPAROSCOPY PACK: Brand: MEDLINE INDUSTRIES, INC.

## (undated) DEVICE — TROCAR: Brand: KII FIOS FIRST ENTRY

## (undated) DEVICE — TRUE CONTENT TO BE POPULATED AS PART OF REBRANDING: Brand: ARGYLE

## (undated) DEVICE — TROCAR: Brand: KII OPTICAL ACCESS SYSTEM

## (undated) DEVICE — DEVICE SUT W/ SZ 0 L48IN VLT POLYSRB SUT DISP ES-9 ENDO

## (undated) DEVICE — AIR SHEET,LAT,COMFORT GLIDE, BLEND 40X80: Brand: MEDLINE

## (undated) DEVICE — PASSIVE LAPSCP FLTR W/ 1/4INX24 TBNG AND M LUER LCK FIT - U

## (undated) DEVICE — ADHESIVE SKIN CLSR 0.7ML TOP DERMBND ADV

## (undated) DEVICE — VALVE SUCTION AIR H2O SET ORCA POD + DISP

## (undated) DEVICE — ENDOSCOPIC KIT 6X3/16 FT COLON W/ 1.1 OZ 2 GWN W/O BRSH

## (undated) DEVICE — SHEARS ENDOSCP HARM 36CM ULTRASONIC CRV TIP UPGRD

## (undated) DEVICE — SOLUTION IRRIG 1000ML 0.9% SOD CHL USP POUR PLAS BTL

## (undated) DEVICE — MERCY FAIRFIELD TURNOVER KIT: Brand: MEDLINE INDUSTRIES, INC.

## (undated) DEVICE — SOLUTION IV IRRIG WATER 500ML POUR BRL ST 2F7113

## (undated) DEVICE — TROCARS: Brand: KII® OPTICAL ACCESS SYSTEM

## (undated) DEVICE — AIR/WATER CLEANING ADAPTER FOR OLYMPUS® GI ENDOSCOPE: Brand: BULLDOG®

## (undated) DEVICE — NEEDLE INSUF L150MM DIA2MM DISP FOR PNEUMOPERI ENDOPATH

## (undated) DEVICE — KIT BARIATRIC SIGNIA TRISTAPLE 1

## (undated) DEVICE — BLANKET WRM W29.9XL79.1IN UP BODY FORC AIR MISTRAL-AIR

## (undated) DEVICE — APPLICATOR PREP 26ML 0.7% IOD POVACRYLEX 74% ISO ALC ST

## (undated) DEVICE — BOWL MED L 32OZ PLAS W/ MOLD GRAD EZ OPN PEEL PCH

## (undated) DEVICE — SHEET,DRAPE,53X77,STERILE: Brand: MEDLINE

## (undated) DEVICE — CRADLE ANK AND FT ELEV FLAT END POLY FOAM W/ VENT H NEUT

## (undated) DEVICE — SUTURE VCRL + SZ 4-0 L18IN ABSRB UD L19MM PS-2 3/8 CIR PRIM VCP496H

## (undated) DEVICE — FORCEPS BX L240CM WRK CHN 2.8MM STD CAP W/ NDL MIC MESH

## (undated) DEVICE — DRAPE,LAP,CHOLE,W/TROUGHS,STERILE: Brand: MEDLINE

## (undated) DEVICE — SYRINGE MED 10ML TRNSLUC BRL PLUNG BLK MRK POLYPR CTRL

## (undated) DEVICE — MOUTHPIECE ENDOSCP L CTRL OPN AND SIDE PORTS DISP

## (undated) DEVICE — SUTURE VCRL PLUS SZ 0 54IN TIE VCP608H

## (undated) DEVICE — LOTION PREP REMV 5OZ IODO CLR TINC OF BENZ DURAPREP

## (undated) DEVICE — SHEET,DRAPE,40X58,STERILE: Brand: MEDLINE